# Patient Record
Sex: MALE | Race: WHITE | Employment: FULL TIME | URBAN - METROPOLITAN AREA
[De-identification: names, ages, dates, MRNs, and addresses within clinical notes are randomized per-mention and may not be internally consistent; named-entity substitution may affect disease eponyms.]

---

## 2017-02-24 ENCOUNTER — LAB ENCOUNTER (OUTPATIENT)
Dept: LAB | Age: 57
End: 2017-02-24
Attending: FAMILY MEDICINE
Payer: COMMERCIAL

## 2017-02-24 DIAGNOSIS — E11.40 TYPE 2 DIABETES MELLITUS WITH DIABETIC NEUROPATHY, WITHOUT LONG-TERM CURRENT USE OF INSULIN (HCC): ICD-10-CM

## 2017-02-24 DIAGNOSIS — Z12.5 SPECIAL SCREENING FOR MALIGNANT NEOPLASM OF PROSTATE: ICD-10-CM

## 2017-02-24 DIAGNOSIS — E11.59 HYPERTENSION ASSOCIATED WITH DIABETES (HCC): ICD-10-CM

## 2017-02-24 DIAGNOSIS — I15.2 HYPERTENSION ASSOCIATED WITH DIABETES (HCC): ICD-10-CM

## 2017-02-24 DIAGNOSIS — R22.40 ANKLE MASS: Primary | ICD-10-CM

## 2017-02-24 DIAGNOSIS — E11.42 DIABETIC POLYNEUROPATHY ASSOCIATED WITH TYPE 2 DIABETES MELLITUS (HCC): ICD-10-CM

## 2017-02-24 LAB
ALBUMIN SERPL-MCNC: 3.7 G/DL (ref 3.5–4.8)
ALP LIVER SERPL-CCNC: 87 U/L (ref 45–117)
ALT SERPL-CCNC: 18 U/L (ref 17–63)
AST SERPL-CCNC: 9 U/L (ref 15–41)
BILIRUB SERPL-MCNC: 0.2 MG/DL (ref 0.1–2)
BUN BLD-MCNC: 13 MG/DL (ref 8–20)
CALCIUM BLD-MCNC: 9.4 MG/DL (ref 8.3–10.3)
CHLORIDE: 104 MMOL/L (ref 101–111)
CO2: 28 MMOL/L (ref 22–32)
CREAT BLD-MCNC: 1.01 MG/DL (ref 0.7–1.3)
EST. AVERAGE GLUCOSE BLD GHB EST-MCNC: 169 MG/DL (ref 68–126)
GLUCOSE BLD-MCNC: 118 MG/DL (ref 70–99)
HBA1C MFR BLD HPLC: 7.5 % (ref ?–5.7)
M PROTEIN MFR SERPL ELPH: 7.4 G/DL (ref 6.1–8.3)
POTASSIUM SERPL-SCNC: 4.3 MMOL/L (ref 3.6–5.1)
PSA SERPL-MCNC: 0.86 NG/ML (ref 0.01–4)
SODIUM SERPL-SCNC: 138 MMOL/L (ref 136–144)
URIC ACID: 5.2 MG/DL (ref 2.4–8.7)

## 2017-02-24 PROCEDURE — 84153 ASSAY OF PSA TOTAL: CPT

## 2017-02-24 PROCEDURE — 36415 COLL VENOUS BLD VENIPUNCTURE: CPT

## 2017-02-24 PROCEDURE — 84550 ASSAY OF BLOOD/URIC ACID: CPT

## 2017-02-24 PROCEDURE — 83036 HEMOGLOBIN GLYCOSYLATED A1C: CPT

## 2017-02-24 PROCEDURE — 80053 COMPREHEN METABOLIC PANEL: CPT

## 2017-03-06 RX ORDER — VALSARTAN 80 MG/1
TABLET ORAL
Qty: 30 TABLET | Refills: 0 | Status: SHIPPED | OUTPATIENT
Start: 2017-03-06 | End: 2017-04-05

## 2017-03-14 ENCOUNTER — HOSPITAL ENCOUNTER (OUTPATIENT)
Dept: MRI IMAGING | Age: 57
Discharge: HOME | End: 2017-03-14
Attending: PODIATRIST
Payer: COMMERCIAL

## 2017-03-14 DIAGNOSIS — M79.89 SOFT TISSUE MASS: ICD-10-CM

## 2017-03-14 PROCEDURE — A9575 INJ GADOTERATE MEGLUMI 0.1ML: HCPCS | Performed by: RADIOLOGY

## 2017-03-14 PROCEDURE — 73723 MRI JOINT LWR EXTR W/O&W/DYE: CPT

## 2017-03-23 ENCOUNTER — OFFICE VISIT (OUTPATIENT)
Dept: FAMILY MEDICINE CLINIC | Facility: CLINIC | Age: 57
End: 2017-03-23

## 2017-03-23 VITALS
WEIGHT: 259 LBS | BODY MASS INDEX: 32 KG/M2 | TEMPERATURE: 99 F | DIASTOLIC BLOOD PRESSURE: 64 MMHG | RESPIRATION RATE: 16 BRPM | HEART RATE: 88 BPM | SYSTOLIC BLOOD PRESSURE: 116 MMHG

## 2017-03-23 DIAGNOSIS — E11.42 DIABETIC POLYNEUROPATHY ASSOCIATED WITH TYPE 2 DIABETES MELLITUS (HCC): ICD-10-CM

## 2017-03-23 DIAGNOSIS — E11.40 TYPE 2 DIABETES MELLITUS WITH DIABETIC NEUROPATHY, WITHOUT LONG-TERM CURRENT USE OF INSULIN (HCC): Primary | ICD-10-CM

## 2017-03-23 DIAGNOSIS — I15.2 HYPERTENSION ASSOCIATED WITH DIABETES (HCC): ICD-10-CM

## 2017-03-23 DIAGNOSIS — E11.59 HYPERTENSION ASSOCIATED WITH DIABETES (HCC): ICD-10-CM

## 2017-03-23 PROCEDURE — 99214 OFFICE O/P EST MOD 30 MIN: CPT | Performed by: FAMILY MEDICINE

## 2017-03-23 RX ORDER — GLIMEPIRIDE 2 MG/1
2 TABLET ORAL
Qty: 30 TABLET | Refills: 3 | Status: SHIPPED | OUTPATIENT
Start: 2017-03-23 | End: 2017-04-05

## 2017-03-23 NOTE — PROGRESS NOTES
Sean Sheikh is a 60year old male. HPI:   Patient presents today for a follow up on his hypertension and diabetes. Lab work reviewed with the patient--pt is due for lipid panel and microalbumin.   Pt reports fasting blood sugars have been around denies headaches, reports bilateral numbness/tingling to lower legs.   Musculoskeletal: No motor deficits  EXAM:   /64 mmHg  Pulse 88  Temp(Src) 98.7 °F (37.1 °C)  Resp 16  Wt 259 lb  GENERAL: well developed, well nourished,in no apparent distress  SK Ratio, Random Urine [E]; Future  - Lipid Panel [E]; Future    2. Diabetic polyneuropathy associated with type 2 diabetes mellitus ([facility]  Will check labs in 3 months. 3. Hypertension associated with diabetes ([facility].)  Continue Valsartan as ordered.     The p

## 2017-03-27 ENCOUNTER — EKG ENCOUNTER (OUTPATIENT)
Dept: LAB | Age: 57
End: 2017-03-27
Payer: COMMERCIAL

## 2017-03-27 DIAGNOSIS — Z01.818 PREOP TESTING: ICD-10-CM

## 2017-03-27 DIAGNOSIS — E11.40 TYPE 2 DIABETES MELLITUS WITH DIABETIC NEUROPATHY, WITHOUT LONG-TERM CURRENT USE OF INSULIN (HCC): ICD-10-CM

## 2017-03-27 DIAGNOSIS — E11.42 DIABETIC POLYNEUROPATHY ASSOCIATED WITH TYPE 2 DIABETES MELLITUS (HCC): ICD-10-CM

## 2017-03-27 LAB
ATRIAL RATE: 86 BPM
P AXIS: 61 DEGREES
P-R INTERVAL: 140 MS
Q-T INTERVAL: 324 MS
QRS DURATION: 82 MS
QTC CALCULATION (BEZET): 387 MS
R AXIS: 10 DEGREES
T AXIS: 59 DEGREES
VENTRICULAR RATE: 86 BPM

## 2017-03-27 PROCEDURE — 93010 ELECTROCARDIOGRAM REPORT: CPT | Performed by: INTERNAL MEDICINE

## 2017-03-27 PROCEDURE — 93005 ELECTROCARDIOGRAM TRACING: CPT

## 2017-03-30 ENCOUNTER — ANESTHESIA EVENT (OUTPATIENT)
Dept: SURGERY | Facility: HOSPITAL | Age: 57
End: 2017-03-30
Payer: COMMERCIAL

## 2017-03-30 ENCOUNTER — OFFICE VISIT (OUTPATIENT)
Dept: FAMILY MEDICINE CLINIC | Facility: CLINIC | Age: 57
End: 2017-03-30

## 2017-03-30 VITALS
SYSTOLIC BLOOD PRESSURE: 120 MMHG | BODY MASS INDEX: 32.45 KG/M2 | WEIGHT: 261 LBS | TEMPERATURE: 100 F | HEIGHT: 75 IN | RESPIRATION RATE: 18 BRPM | DIASTOLIC BLOOD PRESSURE: 56 MMHG | OXYGEN SATURATION: 99 % | HEART RATE: 91 BPM

## 2017-03-30 DIAGNOSIS — S86.219A RUPTURE OF TIBIALIS ANTERIOR TENDON: ICD-10-CM

## 2017-03-30 DIAGNOSIS — Z01.818 PREOPERATIVE CLEARANCE: Primary | ICD-10-CM

## 2017-03-30 DIAGNOSIS — E11.59 HYPERTENSION ASSOCIATED WITH DIABETES (HCC): ICD-10-CM

## 2017-03-30 DIAGNOSIS — I15.2 HYPERTENSION ASSOCIATED WITH DIABETES (HCC): ICD-10-CM

## 2017-03-30 DIAGNOSIS — E11.40 TYPE 2 DIABETES MELLITUS WITH DIABETIC NEUROPATHY, WITHOUT LONG-TERM CURRENT USE OF INSULIN (HCC): ICD-10-CM

## 2017-03-30 LAB
CREAT UR-SCNC: 132 MG/DL
MICROALBUMIN UR-MCNC: 0.96 MG/DL
MICROALBUMIN/CREAT 24H UR-RTO: 7.3 UG/MG (ref ?–30)

## 2017-03-30 PROCEDURE — 82570 ASSAY OF URINE CREATININE: CPT | Performed by: FAMILY MEDICINE

## 2017-03-30 PROCEDURE — 82043 UR ALBUMIN QUANTITATIVE: CPT | Performed by: FAMILY MEDICINE

## 2017-03-30 PROCEDURE — 99243 OFF/OP CNSLTJ NEW/EST LOW 30: CPT | Performed by: FAMILY MEDICINE

## 2017-03-30 NOTE — ANESTHESIA PREPROCEDURE EVALUATION
PRE-OP EVALUATION    Patient Name: Delaney Palmer    Pre-op Diagnosis: RUPTURE LEFT ANTERIOR TIBIALIS TENDON      Procedure(s):  REPAIR OF TIBIALIS ANTERIOR TENDON WITH ALLOGRAFT LEFT FOOT      Surgeon(s) and Role:     * EDMUND Rogers Smoking status: Light Tobacco Smoker  For 38.00 Years     Types: Cigarettes    Smokeless tobacco: Never Used    Comment: occasional cigarette, three to four cigs per week      Alcohol Use: Yes  0.0 - 0.6 oz/week    0-1 Standard drinks or equivalent

## 2017-03-30 NOTE — PROGRESS NOTES
Elisha Okeefe is a 60year old male who presents for a pre-operative physical exam. Patient is to have repair of left anterior tibialalis tendon, to be done by Dr. Wellington Zhou at [facility] on 3/31/17.       HPI:   Pt complains of left foot pain for vision  HEENT: denies nasal congestion, sinus pain or ST  LUNGS: denies shortness of breath with exertion  CARDIOVASCULAR: denies chest pain on exertion  GI: denies abdominal pain,denies heartburn  : denies dysuria  MUSCULOSKELETAL: See HPI  NEURO: denie

## 2017-03-31 ENCOUNTER — SURGERY (OUTPATIENT)
Age: 57
End: 2017-03-31

## 2017-03-31 ENCOUNTER — APPOINTMENT (OUTPATIENT)
Dept: GENERAL RADIOLOGY | Facility: HOSPITAL | Age: 57
End: 2017-03-31
Attending: PODIATRIST
Payer: COMMERCIAL

## 2017-03-31 ENCOUNTER — HOSPITAL ENCOUNTER (OUTPATIENT)
Facility: HOSPITAL | Age: 57
Setting detail: HOSPITAL OUTPATIENT SURGERY
Discharge: HOME | End: 2017-03-31
Attending: PODIATRIST | Admitting: PODIATRIST
Payer: COMMERCIAL

## 2017-03-31 ENCOUNTER — ANESTHESIA (OUTPATIENT)
Dept: SURGERY | Facility: HOSPITAL | Age: 57
End: 2017-03-31
Payer: COMMERCIAL

## 2017-03-31 VITALS
RESPIRATION RATE: 20 BRPM | DIASTOLIC BLOOD PRESSURE: 80 MMHG | HEART RATE: 77 BPM | OXYGEN SATURATION: 100 % | BODY MASS INDEX: 31.66 KG/M2 | WEIGHT: 260 LBS | HEIGHT: 76 IN | SYSTOLIC BLOOD PRESSURE: 159 MMHG | TEMPERATURE: 98 F

## 2017-03-31 DIAGNOSIS — Z01.818 PREOP TESTING: Primary | ICD-10-CM

## 2017-03-31 PROCEDURE — 82962 GLUCOSE BLOOD TEST: CPT

## 2017-03-31 PROCEDURE — 0LUW0KZ SUPPLEMENT LEFT FOOT TENDON WITH NONAUTOLOGOUS TISSUE SUBSTITUTE, OPEN APPROACH: ICD-10-PCS | Performed by: PODIATRIST

## 2017-03-31 PROCEDURE — 3E0T3CZ INTRODUCTION OF REGIONAL ANESTHETIC INTO PERIPHERAL NERVES AND PLEXI, PERCUTANEOUS APPROACH: ICD-10-PCS | Performed by: ANESTHESIOLOGY

## 2017-03-31 PROCEDURE — 76942 ECHO GUIDE FOR BIOPSY: CPT | Performed by: PODIATRIST

## 2017-03-31 PROCEDURE — 76000 FLUOROSCOPY <1 HR PHYS/QHP: CPT

## 2017-03-31 DEVICE — IMPLANTABLE DEVICE: Type: IMPLANTABLE DEVICE | Site: FOOT | Status: FUNCTIONAL

## 2017-03-31 RX ORDER — HYDROMORPHONE HYDROCHLORIDE 1 MG/ML
0.4 INJECTION, SOLUTION INTRAMUSCULAR; INTRAVENOUS; SUBCUTANEOUS EVERY 5 MIN PRN
Status: DISCONTINUED | OUTPATIENT
Start: 2017-03-31 | End: 2017-03-31

## 2017-03-31 RX ORDER — DEXTROSE MONOHYDRATE 25 G/50ML
50 INJECTION, SOLUTION INTRAVENOUS
Status: DISCONTINUED | OUTPATIENT
Start: 2017-03-31 | End: 2017-03-31 | Stop reason: HOSPADM

## 2017-03-31 RX ORDER — ONDANSETRON 2 MG/ML
4 INJECTION INTRAMUSCULAR; INTRAVENOUS AS NEEDED
Status: DISCONTINUED | OUTPATIENT
Start: 2017-03-31 | End: 2017-03-31

## 2017-03-31 RX ORDER — DEXTROSE MONOHYDRATE 25 G/50ML
50 INJECTION, SOLUTION INTRAVENOUS
Status: DISCONTINUED | OUTPATIENT
Start: 2017-03-31 | End: 2017-03-31

## 2017-03-31 RX ORDER — SODIUM CHLORIDE, SODIUM LACTATE, POTASSIUM CHLORIDE, CALCIUM CHLORIDE 600; 310; 30; 20 MG/100ML; MG/100ML; MG/100ML; MG/100ML
INJECTION, SOLUTION INTRAVENOUS CONTINUOUS
Status: DISCONTINUED | OUTPATIENT
Start: 2017-03-31 | End: 2017-03-31

## 2017-03-31 RX ORDER — HYDROCODONE BITARTRATE AND ACETAMINOPHEN 5; 325 MG/1; MG/1
2 TABLET ORAL AS NEEDED
Status: DISCONTINUED | OUTPATIENT
Start: 2017-03-31 | End: 2017-03-31

## 2017-03-31 RX ORDER — INSULIN ASPART 100 [IU]/ML
INJECTION, SOLUTION INTRAVENOUS; SUBCUTANEOUS ONCE
Status: DISCONTINUED | OUTPATIENT
Start: 2017-03-31 | End: 2017-03-31

## 2017-03-31 RX ORDER — NALOXONE HYDROCHLORIDE 0.4 MG/ML
80 INJECTION, SOLUTION INTRAMUSCULAR; INTRAVENOUS; SUBCUTANEOUS AS NEEDED
Status: DISCONTINUED | OUTPATIENT
Start: 2017-03-31 | End: 2017-03-31

## 2017-03-31 RX ORDER — ACETAMINOPHEN 500 MG
1000 TABLET ORAL ONCE AS NEEDED
Status: DISCONTINUED | OUTPATIENT
Start: 2017-03-31 | End: 2017-03-31

## 2017-03-31 RX ORDER — HYDROCODONE BITARTRATE AND ACETAMINOPHEN 5; 325 MG/1; MG/1
1 TABLET ORAL AS NEEDED
Status: DISCONTINUED | OUTPATIENT
Start: 2017-03-31 | End: 2017-03-31

## 2017-03-31 NOTE — ANESTHESIA POSTPROCEDURE EVALUATION
[address] Bourguiba II Patient Status:  Hospital Outpatient Surgery   Age/Gender 60year old male MRN [id number]   [facility] SURGERY Attending Shanna Kelley, [address] Day # 0 PCP Maico Galarza MD       Anesthesia

## 2017-03-31 NOTE — OPERATIVE REPORT
Operative Note    Patient Name: Chiara Ruiz II    Preoperative Diagnosis: left anterior tibialis tendon rupture    Postoperative Diagnosis: Left anterior tibialis tendon rupture    Primary Surgeon: Jesse Galicia DPM     Assistant: Abida Greene area on the foot was then identified and the first cuneiform and a drill hole was made initially with a 5 mm drill bit and then using a whipstitch we tried to pass the tendon through the drill hole drill hole was too small that was enlarged to a 7 mm drill

## 2017-04-05 RX ORDER — GLIMEPIRIDE 2 MG/1
2 TABLET ORAL
Qty: 90 TABLET | Refills: 1 | Status: SHIPPED | OUTPATIENT
Start: 2017-04-05 | End: 2017-10-17

## 2017-04-05 RX ORDER — VALSARTAN 80 MG/1
TABLET ORAL
Qty: 90 TABLET | Refills: 1 | Status: SHIPPED | OUTPATIENT
Start: 2017-04-05 | End: 2017-10-17

## 2017-04-05 NOTE — TELEPHONE ENCOUNTER
Last ofc med visit 3/23/17-advised to follow up in 6 months.    Hgb A1C last month 7.5% all med protocol info we have received sts A1C < or =7.5 passes protocol, however Epic flags diabetic meds as fails protocol for A1C 7.5.  meds meet protocol per our muriel

## 2017-04-05 NOTE — TELEPHONE ENCOUNTER
Call to pt's home reaches identified # voice mail. Per hipaa consent, eft vmm advising mail order med refills sent to [facility] as requested.   Advised if any med refills needed before mail order arrives, please call ofc and speak with triage nurse and we kajal

## 2017-04-05 NOTE — TELEPHONE ENCOUNTER
Pt has mail order pharmacy now and would like refills sent to [facility]:  Linagliptin (TRADJENTA) 5 MG Oral Tab, MetFORMIN HCl 1000 MG Oral Tab, VALSARTAN 80 MG Oral Tab and glimepiride 2 MG Oral Tab.

## 2017-05-09 ENCOUNTER — TELEPHONE (OUTPATIENT)
Dept: FAMILY MEDICINE CLINIC | Facility: CLINIC | Age: 57
End: 2017-05-09

## 2017-05-09 NOTE — TELEPHONE ENCOUNTER
Please call patient to advise them that they are due for their yearly Diabetic Eye exam. Please inquire the following    Name of eye doctor: Dr Mike Almanza, [address]    Date of last eye exam: 7/16/15    If pt has already had eye exam this

## 2017-10-10 RX ORDER — VALSARTAN 80 MG/1
TABLET ORAL
Qty: 90 TABLET | Refills: 0 | Status: CANCELLED | OUTPATIENT
Start: 2017-10-10

## 2017-10-10 RX ORDER — GLIMEPIRIDE 2 MG/1
TABLET ORAL
Qty: 90 TABLET | Refills: 0 | Status: CANCELLED | OUTPATIENT
Start: 2017-10-10

## 2017-10-10 RX ORDER — LINAGLIPTIN 5 MG/1
TABLET, FILM COATED ORAL
Qty: 90 TABLET | Refills: 0 | Status: CANCELLED | OUTPATIENT
Start: 2017-10-10

## 2017-10-16 DIAGNOSIS — E11.9 TYPE II OR UNSPECIFIED TYPE DIABETES MELLITUS WITHOUT MENTION OF COMPLICATION, NOT STATED AS UNCONTROLLED: ICD-10-CM

## 2017-10-16 RX ORDER — GLIMEPIRIDE 2 MG/1
2 TABLET ORAL
Qty: 90 TABLET | Refills: 1 | Status: CANCELLED
Start: 2017-10-16

## 2017-10-16 RX ORDER — VALSARTAN 80 MG/1
TABLET ORAL
Qty: 90 TABLET | Refills: 1 | Status: CANCELLED
Start: 2017-10-16

## 2017-10-17 ENCOUNTER — OFFICE VISIT (OUTPATIENT)
Dept: FAMILY MEDICINE CLINIC | Facility: CLINIC | Age: 57
End: 2017-10-17

## 2017-10-17 VITALS
HEART RATE: 88 BPM | WEIGHT: 263 LBS | RESPIRATION RATE: 12 BRPM | BODY MASS INDEX: 32.7 KG/M2 | TEMPERATURE: 100 F | DIASTOLIC BLOOD PRESSURE: 66 MMHG | SYSTOLIC BLOOD PRESSURE: 134 MMHG | HEIGHT: 75 IN

## 2017-10-17 DIAGNOSIS — E11.42 DIABETIC POLYNEUROPATHY ASSOCIATED WITH TYPE 2 DIABETES MELLITUS (HCC): ICD-10-CM

## 2017-10-17 DIAGNOSIS — I15.2 HYPERTENSION ASSOCIATED WITH DIABETES (HCC): ICD-10-CM

## 2017-10-17 DIAGNOSIS — E11.59 HYPERTENSION ASSOCIATED WITH DIABETES (HCC): ICD-10-CM

## 2017-10-17 DIAGNOSIS — E11.40 TYPE 2 DIABETES MELLITUS WITH DIABETIC NEUROPATHY, WITHOUT LONG-TERM CURRENT USE OF INSULIN (HCC): Primary | ICD-10-CM

## 2017-10-17 PROCEDURE — 99214 OFFICE O/P EST MOD 30 MIN: CPT | Performed by: FAMILY MEDICINE

## 2017-10-17 NOTE — PROGRESS NOTES
HPI:   Stef Negron is a 59year old male who presents for recheck of his diabetes. He admits that he has been traveling frequently and his diet is not what it should be. Patient’s FBS have been 120-150's.  His last hemoglobin A1c was 7.5 on 2/24/2 04/01/2016 9 (L)   03/10/2014 15   04/12/2013 12 (L)   01/09/2013 10 (L)   ----------  ALT (U/L)   Date Value   03/10/2014 23   04/12/2013 24   01/09/2013 22   ----------  Alt (U/L)   Date Value   10/18/2017 22   02/24/2017 18   04/01/2016 24   --------- OF SYSTEMS:   GENERAL HEALTH: feels well otherwise  SKIN: denies any unusual skin lesions or rashes  RESPIRATORY: denies shortness of breath with exertion  CARDIOVASCULAR: denies chest pain on exertion  GI: denies abdominal pain and denies heartburn  NEURO

## 2017-10-18 ENCOUNTER — APPOINTMENT (OUTPATIENT)
Dept: LAB | Age: 57
End: 2017-10-18
Attending: FAMILY MEDICINE
Payer: COMMERCIAL

## 2017-10-18 DIAGNOSIS — Z01.818 PREOP TESTING: ICD-10-CM

## 2017-10-18 DIAGNOSIS — E11.42 DIABETIC POLYNEUROPATHY ASSOCIATED WITH TYPE 2 DIABETES MELLITUS (HCC): ICD-10-CM

## 2017-10-18 DIAGNOSIS — E11.40 TYPE 2 DIABETES MELLITUS WITH DIABETIC NEUROPATHY, WITHOUT LONG-TERM CURRENT USE OF INSULIN (HCC): ICD-10-CM

## 2017-10-18 PROCEDURE — 80053 COMPREHEN METABOLIC PANEL: CPT

## 2017-10-18 PROCEDURE — 80061 LIPID PANEL: CPT

## 2017-10-18 PROCEDURE — 82043 UR ALBUMIN QUANTITATIVE: CPT

## 2017-10-18 PROCEDURE — 83036 HEMOGLOBIN GLYCOSYLATED A1C: CPT

## 2017-10-18 PROCEDURE — 82570 ASSAY OF URINE CREATININE: CPT

## 2017-10-18 PROCEDURE — 36415 COLL VENOUS BLD VENIPUNCTURE: CPT

## 2017-10-19 ENCOUNTER — TELEPHONE (OUTPATIENT)
Dept: FAMILY MEDICINE CLINIC | Facility: CLINIC | Age: 57
End: 2017-10-19

## 2017-10-19 DIAGNOSIS — E11.42 DIABETIC POLYNEUROPATHY ASSOCIATED WITH TYPE 2 DIABETES MELLITUS (HCC): Primary | ICD-10-CM

## 2017-10-19 DIAGNOSIS — Z12.5 SPECIAL SCREENING FOR MALIGNANT NEOPLASM OF PROSTATE: ICD-10-CM

## 2017-10-22 RX ORDER — GLIMEPIRIDE 2 MG/1
2 TABLET ORAL
Qty: 90 TABLET | Refills: 1 | Status: SHIPPED | OUTPATIENT
Start: 2017-10-22 | End: 2017-10-26

## 2017-10-22 RX ORDER — VALSARTAN 80 MG/1
TABLET ORAL
Qty: 90 TABLET | Refills: 1 | Status: SHIPPED | OUTPATIENT
Start: 2017-10-22 | End: 2017-10-26

## 2017-10-26 ENCOUNTER — TELEPHONE (OUTPATIENT)
Dept: FAMILY MEDICINE CLINIC | Facility: CLINIC | Age: 57
End: 2017-10-26

## 2017-10-26 DIAGNOSIS — E11.42 DIABETIC POLYNEUROPATHY ASSOCIATED WITH TYPE 2 DIABETES MELLITUS (HCC): ICD-10-CM

## 2017-10-26 DIAGNOSIS — I15.2 HYPERTENSION ASSOCIATED WITH DIABETES (HCC): ICD-10-CM

## 2017-10-26 DIAGNOSIS — E11.40 TYPE 2 DIABETES MELLITUS WITH DIABETIC NEUROPATHY, WITHOUT LONG-TERM CURRENT USE OF INSULIN (HCC): ICD-10-CM

## 2017-10-26 DIAGNOSIS — E11.59 HYPERTENSION ASSOCIATED WITH DIABETES (HCC): ICD-10-CM

## 2017-10-26 RX ORDER — VALSARTAN 80 MG/1
TABLET ORAL
Qty: 30 TABLET | Refills: 0 | Status: SHIPPED | OUTPATIENT
Start: 2017-10-26 | End: 2018-10-15

## 2017-10-26 RX ORDER — GLIMEPIRIDE 2 MG/1
2 TABLET ORAL
Qty: 30 TABLET | Refills: 0 | Status: SHIPPED | OUTPATIENT
Start: 2017-10-26 | End: 2018-10-23

## 2018-10-04 PROBLEM — M14.60 CHARCOT'S ARTHROPATHY: Status: ACTIVE | Noted: 2018-10-04

## 2018-10-04 PROBLEM — M19.272 OTHER SECONDARY OSTEOARTHRITIS OF LEFT FOOT: Status: ACTIVE | Noted: 2018-10-04

## 2018-10-04 PROBLEM — M25.375 INSTABILITY OF LEFT FOOT JOINT: Status: ACTIVE | Noted: 2018-10-04

## 2018-10-15 ENCOUNTER — EKG ENCOUNTER (OUTPATIENT)
Dept: LAB | Facility: HOSPITAL | Age: 58
End: 2018-10-15
Attending: ORTHOPAEDIC SURGERY
Payer: COMMERCIAL

## 2018-10-15 ENCOUNTER — TELEPHONE (OUTPATIENT)
Dept: FAMILY MEDICINE CLINIC | Facility: CLINIC | Age: 58
End: 2018-10-15

## 2018-10-15 DIAGNOSIS — E11.42 DIABETIC POLYNEUROPATHY ASSOCIATED WITH TYPE 2 DIABETES MELLITUS (HCC): ICD-10-CM

## 2018-10-15 DIAGNOSIS — E11.40 TYPE 2 DIABETES MELLITUS WITH DIABETIC NEUROPATHY, WITHOUT LONG-TERM CURRENT USE OF INSULIN (HCC): ICD-10-CM

## 2018-10-15 DIAGNOSIS — Z01.812 PRE-OPERATIVE LABORATORY EXAMINATION: ICD-10-CM

## 2018-10-15 PROCEDURE — 36415 COLL VENOUS BLD VENIPUNCTURE: CPT

## 2018-10-15 PROCEDURE — 93010 ELECTROCARDIOGRAM REPORT: CPT | Performed by: INTERNAL MEDICINE

## 2018-10-15 PROCEDURE — 80048 BASIC METABOLIC PNL TOTAL CA: CPT

## 2018-10-15 PROCEDURE — 93005 ELECTROCARDIOGRAM TRACING: CPT

## 2018-10-15 RX ORDER — LOSARTAN POTASSIUM 50 MG/1
50 TABLET ORAL DAILY
Qty: 30 TABLET | Refills: 0 | Status: SHIPPED | OUTPATIENT
Start: 2018-10-15 | End: 2018-10-23

## 2018-10-15 NOTE — TELEPHONE ENCOUNTER
Pt called and stated that the airline lost his bag since Sunday (when he was supposed to get it). He's asking for a refill for 30 days on 3 medications.     Linagliptin (TRADJENTA) 5 MG Oral Tab  valsartan 80 MG Oral Tab  MetFORMIN HCl 1000 MG Oral Tab

## 2018-10-15 NOTE — TELEPHONE ENCOUNTER
See note below, pending refill for Metformin and switching Valsartan 80 mg to Losartan 50mg. Pt will be calling his insurance for an [facility]

## 2018-10-23 ENCOUNTER — TELEPHONE (OUTPATIENT)
Dept: FAMILY MEDICINE CLINIC | Facility: CLINIC | Age: 58
End: 2018-10-23

## 2018-10-23 DIAGNOSIS — E11.40 TYPE 2 DIABETES MELLITUS WITH DIABETIC NEUROPATHY, WITHOUT LONG-TERM CURRENT USE OF INSULIN (HCC): ICD-10-CM

## 2018-10-23 DIAGNOSIS — E11.42 DIABETIC POLYNEUROPATHY ASSOCIATED WITH TYPE 2 DIABETES MELLITUS (HCC): ICD-10-CM

## 2018-10-23 RX ORDER — GLIMEPIRIDE 2 MG/1
TABLET ORAL
Qty: 90 TABLET | Refills: 0 | Status: SHIPPED | OUTPATIENT
Start: 2018-10-23 | End: 2018-12-07 | Stop reason: DRUGHIGH

## 2018-10-23 RX ORDER — LOSARTAN POTASSIUM 50 MG/1
50 TABLET ORAL DAILY
Qty: 90 TABLET | Refills: 0 | Status: SHIPPED | OUTPATIENT
Start: 2018-10-23 | End: 2019-01-08

## 2018-10-23 RX ORDER — LINAGLIPTIN 5 MG/1
TABLET, FILM COATED ORAL
Qty: 90 TABLET | Refills: 0 | Status: SHIPPED | OUTPATIENT
Start: 2018-10-23 | End: 2018-11-29 | Stop reason: ALTCHOICE

## 2018-10-23 NOTE — TELEPHONE ENCOUNTER
Fax from [employer]. Asking for alternative for valsartan d/t recall. Per TE 10/18 this alternative was already advised. Needs rx to be sent to mail order. Done.

## 2018-11-29 ENCOUNTER — OFFICE VISIT (OUTPATIENT)
Dept: FAMILY MEDICINE CLINIC | Facility: CLINIC | Age: 58
End: 2018-11-29
Payer: COMMERCIAL

## 2018-11-29 VITALS
DIASTOLIC BLOOD PRESSURE: 76 MMHG | TEMPERATURE: 98 F | SYSTOLIC BLOOD PRESSURE: 142 MMHG | HEART RATE: 88 BPM | BODY MASS INDEX: 32.51 KG/M2 | WEIGHT: 267 LBS | HEIGHT: 76 IN | RESPIRATION RATE: 18 BRPM

## 2018-11-29 DIAGNOSIS — E11.42 DIABETIC POLYNEUROPATHY ASSOCIATED WITH TYPE 2 DIABETES MELLITUS (HCC): ICD-10-CM

## 2018-11-29 DIAGNOSIS — E11.40 TYPE 2 DIABETES MELLITUS WITH DIABETIC NEUROPATHY, WITHOUT LONG-TERM CURRENT USE OF INSULIN (HCC): Primary | ICD-10-CM

## 2018-11-29 DIAGNOSIS — E11.59 HYPERTENSION ASSOCIATED WITH DIABETES (HCC): ICD-10-CM

## 2018-11-29 DIAGNOSIS — M14.60 CHARCOT'S ARTHROPATHY: ICD-10-CM

## 2018-11-29 DIAGNOSIS — I15.2 HYPERTENSION ASSOCIATED WITH DIABETES (HCC): ICD-10-CM

## 2018-11-29 PROCEDURE — 99214 OFFICE O/P EST MOD 30 MIN: CPT | Performed by: FAMILY MEDICINE

## 2018-11-29 NOTE — PROGRESS NOTES
HPI:   Iantha Cowden. is a 59year old male who presents for recheck of his diabetes. He admits that he has been traveling frequently and his diet is not what it should be. Patient’s FBS have been 120-150's.  His last hemoglobin A1c was 6.5 on 10/18 (U/L)   Date Value   11/30/2018 14 (L)   10/18/2017 13 (L)   02/24/2017 9 (L)   03/10/2014 15   04/12/2013 12 (L)   01/09/2013 10 (L)     ALT (U/L)   Date Value   03/10/2014 23   04/12/2013 24   01/09/2013 22     Alt (U/L)   Date Value   11/30/2018 18   10 38.00        Types: Cigarettes      Smokeless tobacco: Never Used      Tobacco comment: occasional cigarette, three to four cigs per week      Alcohol use:  Yes      Alcohol/week: 0.0 - 0.6 oz      Comment: 2 drinks a week     Drug use: No    Exercise: mini

## 2018-11-30 ENCOUNTER — LAB ENCOUNTER (OUTPATIENT)
Dept: LAB | Age: 58
End: 2018-11-30
Attending: FAMILY MEDICINE
Payer: COMMERCIAL

## 2018-11-30 DIAGNOSIS — E11.9 TYPE 2 DIABETES MELLITUS (HCC): Primary | ICD-10-CM

## 2018-11-30 DIAGNOSIS — Z00.00 ROUTINE GENERAL MEDICAL EXAMINATION AT A HEALTH CARE FACILITY: ICD-10-CM

## 2018-11-30 PROCEDURE — 84153 ASSAY OF PSA TOTAL: CPT | Performed by: FAMILY MEDICINE

## 2018-11-30 PROCEDURE — 80053 COMPREHEN METABOLIC PANEL: CPT | Performed by: FAMILY MEDICINE

## 2018-11-30 PROCEDURE — 36415 COLL VENOUS BLD VENIPUNCTURE: CPT | Performed by: FAMILY MEDICINE

## 2018-11-30 PROCEDURE — 82570 ASSAY OF URINE CREATININE: CPT | Performed by: FAMILY MEDICINE

## 2018-11-30 PROCEDURE — 80061 LIPID PANEL: CPT | Performed by: FAMILY MEDICINE

## 2018-11-30 PROCEDURE — 83036 HEMOGLOBIN GLYCOSYLATED A1C: CPT | Performed by: FAMILY MEDICINE

## 2018-11-30 PROCEDURE — 82043 UR ALBUMIN QUANTITATIVE: CPT | Performed by: FAMILY MEDICINE

## 2018-12-07 ENCOUNTER — PATIENT MESSAGE (OUTPATIENT)
Dept: FAMILY MEDICINE CLINIC | Facility: CLINIC | Age: 58
End: 2018-12-07

## 2018-12-07 DIAGNOSIS — E11.42 DIABETIC POLYNEUROPATHY ASSOCIATED WITH TYPE 2 DIABETES MELLITUS (HCC): ICD-10-CM

## 2018-12-07 DIAGNOSIS — E11.40 TYPE 2 DIABETES MELLITUS WITH DIABETIC NEUROPATHY, WITHOUT LONG-TERM CURRENT USE OF INSULIN (HCC): ICD-10-CM

## 2018-12-07 RX ORDER — GLIMEPIRIDE 2 MG/1
2 TABLET ORAL 2 TIMES DAILY
Qty: 60 TABLET | Refills: 2 | Status: SHIPPED | OUTPATIENT
Start: 2018-12-07 | End: 2019-01-08

## 2019-01-07 ENCOUNTER — TELEPHONE (OUTPATIENT)
Dept: FAMILY MEDICINE CLINIC | Facility: CLINIC | Age: 59
End: 2019-01-07

## 2019-01-07 DIAGNOSIS — M25.375 FOOT JOINT INSTABILITY, LEFT: Primary | ICD-10-CM

## 2019-01-07 DIAGNOSIS — M19.272: ICD-10-CM

## 2019-01-07 NOTE — TELEPHONE ENCOUNTER
Referral to Claire Grewal (INTERNAL)    Reason for the order/referral: Broken (L) foot   PCP:  Dr. Gladys Fitch   Refer to Provider (first and last name): Dr. Boris Ingram   Specialty: Ortho   Patient Insurance: Payor: Radha Toribio

## 2019-01-08 DIAGNOSIS — E11.40 TYPE 2 DIABETES MELLITUS WITH DIABETIC NEUROPATHY, WITHOUT LONG-TERM CURRENT USE OF INSULIN (HCC): ICD-10-CM

## 2019-01-08 DIAGNOSIS — E11.42 DIABETIC POLYNEUROPATHY ASSOCIATED WITH TYPE 2 DIABETES MELLITUS (HCC): ICD-10-CM

## 2019-01-08 RX ORDER — LOSARTAN POTASSIUM 50 MG/1
50 TABLET ORAL DAILY
Qty: 90 TABLET | Refills: 0 | Status: SHIPPED | OUTPATIENT
Start: 2019-01-08 | End: 2019-06-18

## 2019-01-08 RX ORDER — GLIMEPIRIDE 2 MG/1
TABLET ORAL
Qty: 90 TABLET | Refills: 0 | Status: SHIPPED | OUTPATIENT
Start: 2019-01-08 | End: 2019-06-09

## 2019-01-28 ENCOUNTER — TELEPHONE (OUTPATIENT)
Dept: FAMILY MEDICINE CLINIC | Facility: CLINIC | Age: 59
End: 2019-01-28

## 2019-02-06 NOTE — TELEPHONE ENCOUNTER
LVM to an identified VM to call back and schedule a med check appt. Pt states in the VM that he wont' be able to listen to the message until 2/11/19.

## 2019-04-30 ENCOUNTER — TELEPHONE (OUTPATIENT)
Dept: FAMILY MEDICINE CLINIC | Facility: CLINIC | Age: 59
End: 2019-04-30

## 2019-04-30 NOTE — TELEPHONE ENCOUNTER
LMOM for pt to call back and confirm if he will still be a pt of  Dr Art Fan as he has not been seen in our office

## 2019-06-09 DIAGNOSIS — E11.40 TYPE 2 DIABETES MELLITUS WITH DIABETIC NEUROPATHY, WITHOUT LONG-TERM CURRENT USE OF INSULIN (HCC): ICD-10-CM

## 2019-06-09 DIAGNOSIS — E11.42 DIABETIC POLYNEUROPATHY ASSOCIATED WITH TYPE 2 DIABETES MELLITUS (HCC): ICD-10-CM

## 2019-06-10 NOTE — TELEPHONE ENCOUNTER
Pt needs appt before any refills. Way overdue. Please have them call to schedule. Thanks    Return in about 3 months (around 2/28/2019) for Hypertension, Hypercholesterolemia, NIDDM.

## 2019-06-18 RX ORDER — GLIMEPIRIDE 2 MG/1
TABLET ORAL
Qty: 30 TABLET | Refills: 0 | Status: SHIPPED | OUTPATIENT
Start: 2019-06-18 | End: 2019-07-10

## 2019-06-18 RX ORDER — LOSARTAN POTASSIUM 50 MG/1
50 TABLET ORAL DAILY
Qty: 30 TABLET | Refills: 0 | Status: SHIPPED | OUTPATIENT
Start: 2019-06-18 | End: 2019-07-16

## 2019-06-21 ENCOUNTER — APPOINTMENT (OUTPATIENT)
Dept: LAB | Age: 59
End: 2019-06-21
Attending: FAMILY MEDICINE
Payer: COMMERCIAL

## 2019-06-21 DIAGNOSIS — E11.40 TYPE 2 DIABETES MELLITUS WITH DIABETIC NEUROPATHY, WITHOUT LONG-TERM CURRENT USE OF INSULIN (HCC): ICD-10-CM

## 2019-06-21 DIAGNOSIS — M19.272 OTHER SECONDARY OSTEOARTHRITIS OF LEFT FOOT: ICD-10-CM

## 2019-06-21 PROCEDURE — 83036 HEMOGLOBIN GLYCOSYLATED A1C: CPT

## 2019-06-21 PROCEDURE — 36415 COLL VENOUS BLD VENIPUNCTURE: CPT

## 2019-06-21 PROCEDURE — 82306 VITAMIN D 25 HYDROXY: CPT

## 2019-06-24 ENCOUNTER — PATIENT MESSAGE (OUTPATIENT)
Dept: FAMILY MEDICINE CLINIC | Facility: CLINIC | Age: 59
End: 2019-06-24

## 2019-06-24 DIAGNOSIS — E11.42 DIABETIC POLYNEUROPATHY ASSOCIATED WITH TYPE 2 DIABETES MELLITUS (HCC): ICD-10-CM

## 2019-06-24 DIAGNOSIS — E11.40 TYPE 2 DIABETES MELLITUS WITH DIABETIC NEUROPATHY, WITHOUT LONG-TERM CURRENT USE OF INSULIN (HCC): Primary | ICD-10-CM

## 2019-06-24 DIAGNOSIS — E78.1 HIGH TRIGLYCERIDES: ICD-10-CM

## 2019-06-28 ENCOUNTER — APPOINTMENT (OUTPATIENT)
Dept: LAB | Age: 59
End: 2019-06-28
Attending: FAMILY MEDICINE
Payer: COMMERCIAL

## 2019-06-28 DIAGNOSIS — E78.1 HIGH TRIGLYCERIDES: ICD-10-CM

## 2019-06-28 DIAGNOSIS — E11.42 DIABETIC POLYNEUROPATHY ASSOCIATED WITH TYPE 2 DIABETES MELLITUS (HCC): ICD-10-CM

## 2019-06-28 DIAGNOSIS — E11.40 TYPE 2 DIABETES MELLITUS WITH DIABETIC NEUROPATHY, WITHOUT LONG-TERM CURRENT USE OF INSULIN (HCC): ICD-10-CM

## 2019-06-28 LAB
ALBUMIN SERPL-MCNC: 3.6 G/DL (ref 3.4–5)
ALBUMIN/GLOB SERPL: 1 {RATIO} (ref 1–2)
ALP LIVER SERPL-CCNC: 80 U/L (ref 45–117)
ALT SERPL-CCNC: 18 U/L (ref 16–61)
ANION GAP SERPL CALC-SCNC: 5 MMOL/L (ref 0–18)
AST SERPL-CCNC: 11 U/L (ref 15–37)
BILIRUB SERPL-MCNC: 0.4 MG/DL (ref 0.1–2)
BUN BLD-MCNC: 17 MG/DL (ref 7–18)
BUN/CREAT SERPL: 15.9 (ref 10–20)
CALCIUM BLD-MCNC: 8.8 MG/DL (ref 8.5–10.1)
CHLORIDE SERPL-SCNC: 106 MMOL/L (ref 98–112)
CHOLEST SMN-MCNC: 137 MG/DL (ref ?–200)
CO2 SERPL-SCNC: 29 MMOL/L (ref 21–32)
CREAT BLD-MCNC: 1.07 MG/DL (ref 0.7–1.3)
CREAT UR-SCNC: 82.1 MG/DL
GLOBULIN PLAS-MCNC: 3.6 G/DL (ref 2.8–4.4)
GLUCOSE BLD-MCNC: 188 MG/DL (ref 70–99)
HDLC SERPL-MCNC: 51 MG/DL (ref 40–59)
LDLC SERPL CALC-MCNC: 74 MG/DL (ref ?–100)
M PROTEIN MFR SERPL ELPH: 7.2 G/DL (ref 6.4–8.2)
MICROALBUMIN UR-MCNC: 1.36 MG/DL
MICROALBUMIN/CREAT 24H UR-RTO: 16.6 UG/MG (ref ?–30)
NONHDLC SERPL-MCNC: 86 MG/DL (ref ?–130)
OSMOLALITY SERPL CALC.SUM OF ELEC: 297 MOSM/KG (ref 275–295)
POTASSIUM SERPL-SCNC: 4.3 MMOL/L (ref 3.5–5.1)
SODIUM SERPL-SCNC: 140 MMOL/L (ref 136–145)
TRIGL SERPL-MCNC: 58 MG/DL (ref 30–149)
VLDLC SERPL CALC-MCNC: 12 MG/DL (ref 0–30)

## 2019-06-28 PROCEDURE — 80053 COMPREHEN METABOLIC PANEL: CPT

## 2019-06-28 PROCEDURE — 82570 ASSAY OF URINE CREATININE: CPT

## 2019-06-28 PROCEDURE — 82043 UR ALBUMIN QUANTITATIVE: CPT

## 2019-06-28 PROCEDURE — 80061 LIPID PANEL: CPT

## 2019-06-28 PROCEDURE — 36415 COLL VENOUS BLD VENIPUNCTURE: CPT

## 2019-07-10 DIAGNOSIS — E11.42 DIABETIC POLYNEUROPATHY ASSOCIATED WITH TYPE 2 DIABETES MELLITUS (HCC): ICD-10-CM

## 2019-07-10 DIAGNOSIS — E11.40 TYPE 2 DIABETES MELLITUS WITH DIABETIC NEUROPATHY, WITHOUT LONG-TERM CURRENT USE OF INSULIN (HCC): ICD-10-CM

## 2019-07-10 RX ORDER — GLIMEPIRIDE 2 MG/1
TABLET ORAL
Qty: 90 TABLET | Refills: 0 | Status: SHIPPED | OUTPATIENT
Start: 2019-07-10 | End: 2019-07-16

## 2019-07-10 NOTE — TELEPHONE ENCOUNTER
Metformin 1000 mg tab. Glimepriride 2 mg. Please see pended medications. Please sign if appropriate. Thank you    A [employer] message was sent to the pt to call and schedule a medication visit. His lat OV was on 11/29/2018.

## 2019-07-16 ENCOUNTER — OFFICE VISIT (OUTPATIENT)
Dept: FAMILY MEDICINE CLINIC | Facility: CLINIC | Age: 59
End: 2019-07-16

## 2019-07-16 VITALS
HEIGHT: 76 IN | BODY MASS INDEX: 33 KG/M2 | HEART RATE: 76 BPM | RESPIRATION RATE: 16 BRPM | TEMPERATURE: 99 F | SYSTOLIC BLOOD PRESSURE: 156 MMHG | DIASTOLIC BLOOD PRESSURE: 72 MMHG | WEIGHT: 271 LBS

## 2019-07-16 DIAGNOSIS — I15.2 HYPERTENSION ASSOCIATED WITH DIABETES (HCC): ICD-10-CM

## 2019-07-16 DIAGNOSIS — Z12.5 SPECIAL SCREENING FOR MALIGNANT NEOPLASM OF PROSTATE: ICD-10-CM

## 2019-07-16 DIAGNOSIS — E11.59 HYPERTENSION ASSOCIATED WITH DIABETES (HCC): ICD-10-CM

## 2019-07-16 DIAGNOSIS — E11.9 TYPE 2 DIABETES MELLITUS WITHOUT COMPLICATION, WITHOUT LONG-TERM CURRENT USE OF INSULIN (HCC): Primary | ICD-10-CM

## 2019-07-16 DIAGNOSIS — M14.60 CHARCOT'S ARTHROPATHY: ICD-10-CM

## 2019-07-16 DIAGNOSIS — E11.42 DIABETIC POLYNEUROPATHY ASSOCIATED WITH TYPE 2 DIABETES MELLITUS (HCC): ICD-10-CM

## 2019-07-16 DIAGNOSIS — R09.89 DECREASED PULSES IN FEET: ICD-10-CM

## 2019-07-16 PROCEDURE — 99214 OFFICE O/P EST MOD 30 MIN: CPT | Performed by: FAMILY MEDICINE

## 2019-07-16 RX ORDER — GLIMEPIRIDE 2 MG/1
TABLET ORAL
Qty: 270 TABLET | Refills: 0 | Status: SHIPPED
Start: 2019-07-16 | End: 2019-10-14

## 2019-07-16 RX ORDER — LOSARTAN POTASSIUM 50 MG/1
50 TABLET ORAL DAILY
Qty: 90 TABLET | Refills: 1 | Status: SHIPPED | OUTPATIENT
Start: 2019-07-16 | End: 2019-12-23

## 2019-07-16 NOTE — PROGRESS NOTES
HPI:   Sandra Madison. is a 59year old male who presents for recheck of his diabetes. He admits that he has been traveling frequently and his diet is not what it should be. Patient’s FBS have been 120-150's.  His last hemoglobin A1c was 7.8 on 6/21/ 11/30/2018 14 (L)   10/18/2017 13 (L)   03/10/2014 15   04/12/2013 12 (L)   01/09/2013 10 (L)     ALT (U/L)   Date Value   06/28/2019 18   11/30/2018 18   10/18/2017 22   03/10/2014 23   04/12/2013 24   01/09/2013 22      Malb/Cre Calc   Date Value Ref R • OTHER      Right big toe partially amputated   • TIBIAL BONE GRAFTING Left 10/19/2018    Performed by Chitra Luna MD at [address] [place] [address]  18 years ago      Social History: Social History    Tobacco Use      Smoking status: of prostate    Orders Placed This Encounter      Comp Metabolic Panel (14)      PSA [53]      Hemoglobin A1C    Meds & Refills for this Visit:  Requested Prescriptions     Signed Prescriptions Disp Refills   • losartan Potassium 50 MG Oral Tab 90 tablet

## 2019-07-25 ENCOUNTER — ANESTHESIA EVENT (OUTPATIENT)
Dept: SURGERY | Facility: HOSPITAL | Age: 59
End: 2019-07-25
Payer: COMMERCIAL

## 2019-07-26 ENCOUNTER — APPOINTMENT (OUTPATIENT)
Dept: GENERAL RADIOLOGY | Facility: HOSPITAL | Age: 59
End: 2019-07-26
Attending: ORTHOPAEDIC SURGERY
Payer: COMMERCIAL

## 2019-07-26 ENCOUNTER — ANESTHESIA (OUTPATIENT)
Dept: SURGERY | Facility: HOSPITAL | Age: 59
End: 2019-07-26
Payer: COMMERCIAL

## 2019-07-26 ENCOUNTER — HOSPITAL ENCOUNTER (OUTPATIENT)
Facility: HOSPITAL | Age: 59
Setting detail: HOSPITAL OUTPATIENT SURGERY
Discharge: HOME | End: 2019-07-26
Attending: ORTHOPAEDIC SURGERY | Admitting: ORTHOPAEDIC SURGERY
Payer: COMMERCIAL

## 2019-07-26 VITALS
OXYGEN SATURATION: 96 % | SYSTOLIC BLOOD PRESSURE: 130 MMHG | WEIGHT: 266 LBS | BODY MASS INDEX: 32.39 KG/M2 | HEIGHT: 76 IN | HEART RATE: 85 BPM | TEMPERATURE: 97 F | RESPIRATION RATE: 16 BRPM | DIASTOLIC BLOOD PRESSURE: 61 MMHG

## 2019-07-26 DIAGNOSIS — M19.272: ICD-10-CM

## 2019-07-26 LAB
GLUCOSE BLDC GLUCOMTR-MCNC: 153 MG/DL (ref 70–99)
GLUCOSE BLDC GLUCOMTR-MCNC: 249 MG/DL (ref 70–99)

## 2019-07-26 PROCEDURE — 99152 MOD SED SAME PHYS/QHP 5/>YRS: CPT | Performed by: ORTHOPAEDIC SURGERY

## 2019-07-26 PROCEDURE — 64445 NJX AA&/STRD SCIATIC NRV IMG: CPT | Performed by: ORTHOPAEDIC SURGERY

## 2019-07-26 PROCEDURE — 64447 NJX AA&/STRD FEMORAL NRV IMG: CPT | Performed by: ORTHOPAEDIC SURGERY

## 2019-07-26 PROCEDURE — 82962 GLUCOSE BLOOD TEST: CPT

## 2019-07-26 PROCEDURE — 0QBH0ZZ EXCISION OF LEFT TIBIA, OPEN APPROACH: ICD-10-PCS | Performed by: ORTHOPAEDIC SURGERY

## 2019-07-26 PROCEDURE — 88300 SURGICAL PATH GROSS: CPT | Performed by: ORTHOPAEDIC SURGERY

## 2019-07-26 PROCEDURE — 0L8T0ZZ DIVISION OF LEFT ANKLE TENDON, OPEN APPROACH: ICD-10-PCS | Performed by: ORTHOPAEDIC SURGERY

## 2019-07-26 PROCEDURE — 3E0T3BZ INTRODUCTION OF ANESTHETIC AGENT INTO PERIPHERAL NERVES AND PLEXI, PERCUTANEOUS APPROACH: ICD-10-PCS | Performed by: ANESTHESIOLOGY

## 2019-07-26 PROCEDURE — 0QSP04Z REPOSITION LEFT METATARSAL WITH INTERNAL FIXATION DEVICE, OPEN APPROACH: ICD-10-PCS | Performed by: ORTHOPAEDIC SURGERY

## 2019-07-26 PROCEDURE — 0SGL07Z FUSION OF LEFT TARSOMETATARSAL JOINT WITH AUTOLOGOUS TISSUE SUBSTITUTE, OPEN APPROACH: ICD-10-PCS | Performed by: ORTHOPAEDIC SURGERY

## 2019-07-26 PROCEDURE — 76942 ECHO GUIDE FOR BIOPSY: CPT | Performed by: ORTHOPAEDIC SURGERY

## 2019-07-26 PROCEDURE — 94010 BREATHING CAPACITY TEST: CPT | Performed by: ORTHOPAEDIC SURGERY

## 2019-07-26 PROCEDURE — 76000 FLUOROSCOPY <1 HR PHYS/QHP: CPT | Performed by: ORTHOPAEDIC SURGERY

## 2019-07-26 DEVICE — ONE (1) PACKAGE - CONTAINING 5.0CC
Type: IMPLANTABLE DEVICE | Site: FOOT | Status: FUNCTIONAL
Brand: OSTEOSELECT PLUS DBM PUTTY 5.0CC

## 2019-07-26 DEVICE — IMPLANTABLE DEVICE
Type: IMPLANTABLE DEVICE | Site: FOOT | Status: FUNCTIONAL
Brand: ORTHOLOC

## 2019-07-26 DEVICE — PROTEIOS XL 10CC: Type: IMPLANTABLE DEVICE | Site: FOOT | Status: FUNCTIONAL

## 2019-07-26 DEVICE — IMPLANTABLE DEVICE
Type: IMPLANTABLE DEVICE | Site: FOOT | Status: FUNCTIONAL
Brand: ORTHOLOC 3DI

## 2019-07-26 RX ORDER — ONDANSETRON 2 MG/ML
4 INJECTION INTRAMUSCULAR; INTRAVENOUS ONCE AS NEEDED
Status: DISCONTINUED | OUTPATIENT
Start: 2019-07-26 | End: 2019-07-26

## 2019-07-26 RX ORDER — MORPHINE SULFATE 4 MG/ML
2 INJECTION, SOLUTION INTRAMUSCULAR; INTRAVENOUS EVERY 2 HOUR PRN
Status: CANCELLED | OUTPATIENT
Start: 2019-07-26

## 2019-07-26 RX ORDER — ROCURONIUM BROMIDE 10 MG/ML
INJECTION, SOLUTION INTRAVENOUS AS NEEDED
Status: DISCONTINUED | OUTPATIENT
Start: 2019-07-26 | End: 2019-07-26 | Stop reason: SURG

## 2019-07-26 RX ORDER — PHENYLEPHRINE HCL 10 MG/ML
VIAL (ML) INJECTION AS NEEDED
Status: DISCONTINUED | OUTPATIENT
Start: 2019-07-26 | End: 2019-07-26 | Stop reason: SURG

## 2019-07-26 RX ORDER — ACETAMINOPHEN 500 MG
1000 TABLET ORAL ONCE
Status: COMPLETED | OUTPATIENT
Start: 2019-07-26 | End: 2019-07-26

## 2019-07-26 RX ORDER — NALOXONE HYDROCHLORIDE 0.4 MG/ML
80 INJECTION, SOLUTION INTRAMUSCULAR; INTRAVENOUS; SUBCUTANEOUS AS NEEDED
Status: DISCONTINUED | OUTPATIENT
Start: 2019-07-26 | End: 2019-07-26

## 2019-07-26 RX ORDER — HYDROMORPHONE HYDROCHLORIDE 1 MG/ML
0.4 INJECTION, SOLUTION INTRAMUSCULAR; INTRAVENOUS; SUBCUTANEOUS EVERY 5 MIN PRN
Status: DISCONTINUED | OUTPATIENT
Start: 2019-07-26 | End: 2019-07-26

## 2019-07-26 RX ORDER — INSULIN ASPART 100 [IU]/ML
INJECTION, SOLUTION INTRAVENOUS; SUBCUTANEOUS ONCE
Status: COMPLETED | OUTPATIENT
Start: 2019-07-26 | End: 2019-07-26

## 2019-07-26 RX ORDER — HALOPERIDOL 5 MG/ML
0.25 INJECTION INTRAMUSCULAR ONCE AS NEEDED
Status: DISCONTINUED | OUTPATIENT
Start: 2019-07-26 | End: 2019-07-26

## 2019-07-26 RX ORDER — SODIUM CHLORIDE, SODIUM LACTATE, POTASSIUM CHLORIDE, CALCIUM CHLORIDE 600; 310; 30; 20 MG/100ML; MG/100ML; MG/100ML; MG/100ML
INJECTION, SOLUTION INTRAVENOUS CONTINUOUS
Status: CANCELLED | OUTPATIENT
Start: 2019-07-26

## 2019-07-26 RX ORDER — METOCLOPRAMIDE 10 MG/1
10 TABLET ORAL ONCE
Status: COMPLETED | OUTPATIENT
Start: 2019-07-26 | End: 2019-07-26

## 2019-07-26 RX ORDER — HYDROCODONE BITARTRATE AND ACETAMINOPHEN 5; 325 MG/1; MG/1
1 TABLET ORAL AS NEEDED
Status: DISCONTINUED | OUTPATIENT
Start: 2019-07-26 | End: 2019-07-26

## 2019-07-26 RX ORDER — ACETAMINOPHEN 325 MG/1
650 TABLET ORAL EVERY 4 HOURS PRN
Status: CANCELLED | OUTPATIENT
Start: 2019-07-26

## 2019-07-26 RX ORDER — HYDROCODONE BITARTRATE AND ACETAMINOPHEN 10; 325 MG/1; MG/1
1 TABLET ORAL EVERY 4 HOURS PRN
Status: CANCELLED | OUTPATIENT
Start: 2019-07-26

## 2019-07-26 RX ORDER — BISACODYL 10 MG
10 SUPPOSITORY, RECTAL RECTAL
Status: CANCELLED | OUTPATIENT
Start: 2019-07-26

## 2019-07-26 RX ORDER — SODIUM PHOSPHATE, DIBASIC AND SODIUM PHOSPHATE, MONOBASIC 7; 19 G/133ML; G/133ML
1 ENEMA RECTAL ONCE AS NEEDED
Status: CANCELLED | OUTPATIENT
Start: 2019-07-26

## 2019-07-26 RX ORDER — PROCHLORPERAZINE EDISYLATE 5 MG/ML
5 INJECTION INTRAMUSCULAR; INTRAVENOUS ONCE AS NEEDED
Status: DISCONTINUED | OUTPATIENT
Start: 2019-07-26 | End: 2019-07-26

## 2019-07-26 RX ORDER — SODIUM CHLORIDE, SODIUM LACTATE, POTASSIUM CHLORIDE, CALCIUM CHLORIDE 600; 310; 30; 20 MG/100ML; MG/100ML; MG/100ML; MG/100ML
INJECTION, SOLUTION INTRAVENOUS CONTINUOUS
Status: DISCONTINUED | OUTPATIENT
Start: 2019-07-26 | End: 2019-07-26

## 2019-07-26 RX ORDER — BUPIVACAINE HYDROCHLORIDE 5 MG/ML
INJECTION, SOLUTION EPIDURAL; INTRACAUDAL AS NEEDED
Status: DISCONTINUED | OUTPATIENT
Start: 2019-07-26 | End: 2019-07-26 | Stop reason: SURG

## 2019-07-26 RX ORDER — ZOLPIDEM TARTRATE 5 MG/1
5 TABLET ORAL NIGHTLY PRN
Status: CANCELLED | OUTPATIENT
Start: 2019-07-26

## 2019-07-26 RX ORDER — MORPHINE SULFATE 4 MG/ML
4 INJECTION, SOLUTION INTRAMUSCULAR; INTRAVENOUS EVERY 2 HOUR PRN
Status: CANCELLED | OUTPATIENT
Start: 2019-07-26

## 2019-07-26 RX ORDER — POLYETHYLENE GLYCOL 3350 17 G/17G
17 POWDER, FOR SOLUTION ORAL DAILY PRN
Status: CANCELLED | OUTPATIENT
Start: 2019-07-26

## 2019-07-26 RX ORDER — MORPHINE SULFATE 10 MG/ML
6 INJECTION, SOLUTION INTRAMUSCULAR; INTRAVENOUS EVERY 10 MIN PRN
Status: DISCONTINUED | OUTPATIENT
Start: 2019-07-26 | End: 2019-07-26

## 2019-07-26 RX ORDER — HYDROCODONE BITARTRATE AND ACETAMINOPHEN 5; 325 MG/1; MG/1
2 TABLET ORAL AS NEEDED
Status: DISCONTINUED | OUTPATIENT
Start: 2019-07-26 | End: 2019-07-26

## 2019-07-26 RX ORDER — ONDANSETRON 2 MG/ML
INJECTION INTRAMUSCULAR; INTRAVENOUS AS NEEDED
Status: DISCONTINUED | OUTPATIENT
Start: 2019-07-26 | End: 2019-07-26 | Stop reason: SURG

## 2019-07-26 RX ORDER — NEOSTIGMINE METHYLSULFATE 0.5 MG/ML
INJECTION INTRAVENOUS AS NEEDED
Status: DISCONTINUED | OUTPATIENT
Start: 2019-07-26 | End: 2019-07-26 | Stop reason: SURG

## 2019-07-26 RX ORDER — DEXAMETHASONE SODIUM PHOSPHATE 4 MG/ML
VIAL (ML) INJECTION AS NEEDED
Status: DISCONTINUED | OUTPATIENT
Start: 2019-07-26 | End: 2019-07-26 | Stop reason: SURG

## 2019-07-26 RX ORDER — SODIUM CHLORIDE 0.9 % (FLUSH) 0.9 %
10 SYRINGE (ML) INJECTION AS NEEDED
Status: CANCELLED | OUTPATIENT
Start: 2019-07-26

## 2019-07-26 RX ORDER — HYDROMORPHONE HYDROCHLORIDE 1 MG/ML
0.2 INJECTION, SOLUTION INTRAMUSCULAR; INTRAVENOUS; SUBCUTANEOUS EVERY 5 MIN PRN
Status: DISCONTINUED | OUTPATIENT
Start: 2019-07-26 | End: 2019-07-26

## 2019-07-26 RX ORDER — FAMOTIDINE 20 MG/1
20 TABLET ORAL ONCE
Status: COMPLETED | OUTPATIENT
Start: 2019-07-26 | End: 2019-07-26

## 2019-07-26 RX ORDER — ASPIRIN 325 MG
325 TABLET ORAL 2 TIMES DAILY
Status: CANCELLED | OUTPATIENT
Start: 2019-07-27 | End: 2019-09-07

## 2019-07-26 RX ORDER — ONDANSETRON 2 MG/ML
4 INJECTION INTRAMUSCULAR; INTRAVENOUS EVERY 6 HOURS PRN
Status: CANCELLED | OUTPATIENT
Start: 2019-07-26

## 2019-07-26 RX ORDER — MORPHINE SULFATE 4 MG/ML
4 INJECTION, SOLUTION INTRAMUSCULAR; INTRAVENOUS EVERY 10 MIN PRN
Status: DISCONTINUED | OUTPATIENT
Start: 2019-07-26 | End: 2019-07-26

## 2019-07-26 RX ORDER — CEFAZOLIN SODIUM/WATER 2 G/20 ML
2 SYRINGE (ML) INTRAVENOUS EVERY 8 HOURS
Status: CANCELLED | OUTPATIENT
Start: 2019-07-26 | End: 2019-07-26

## 2019-07-26 RX ORDER — DEXTROSE MONOHYDRATE 25 G/50ML
50 INJECTION, SOLUTION INTRAVENOUS
Status: DISCONTINUED | OUTPATIENT
Start: 2019-07-26 | End: 2019-07-26

## 2019-07-26 RX ORDER — HYDROMORPHONE HYDROCHLORIDE 1 MG/ML
0.6 INJECTION, SOLUTION INTRAMUSCULAR; INTRAVENOUS; SUBCUTANEOUS EVERY 5 MIN PRN
Status: DISCONTINUED | OUTPATIENT
Start: 2019-07-26 | End: 2019-07-26

## 2019-07-26 RX ORDER — MORPHINE SULFATE 4 MG/ML
2 INJECTION, SOLUTION INTRAMUSCULAR; INTRAVENOUS EVERY 10 MIN PRN
Status: DISCONTINUED | OUTPATIENT
Start: 2019-07-26 | End: 2019-07-26

## 2019-07-26 RX ORDER — MORPHINE SULFATE 4 MG/ML
1 INJECTION, SOLUTION INTRAMUSCULAR; INTRAVENOUS EVERY 2 HOUR PRN
Status: CANCELLED | OUTPATIENT
Start: 2019-07-26

## 2019-07-26 RX ORDER — DOCUSATE SODIUM 100 MG/1
100 CAPSULE, LIQUID FILLED ORAL 2 TIMES DAILY
Status: CANCELLED | OUTPATIENT
Start: 2019-07-26

## 2019-07-26 RX ORDER — GLYCOPYRROLATE 0.2 MG/ML
INJECTION INTRAMUSCULAR; INTRAVENOUS AS NEEDED
Status: DISCONTINUED | OUTPATIENT
Start: 2019-07-26 | End: 2019-07-26 | Stop reason: SURG

## 2019-07-26 RX ORDER — HYDROCODONE BITARTRATE AND ACETAMINOPHEN 10; 325 MG/1; MG/1
2 TABLET ORAL EVERY 4 HOURS PRN
Status: CANCELLED | OUTPATIENT
Start: 2019-07-26

## 2019-07-26 RX ADMIN — GLYCOPYRROLATE 0.6 MG: 0.2 INJECTION INTRAMUSCULAR; INTRAVENOUS at 10:46:00

## 2019-07-26 RX ADMIN — BUPIVACAINE HYDROCHLORIDE 5 ML: 5 INJECTION, SOLUTION EPIDURAL; INTRACAUDAL at 07:21:00

## 2019-07-26 RX ADMIN — BUPIVACAINE HYDROCHLORIDE 5 ML: 5 INJECTION, SOLUTION EPIDURAL; INTRACAUDAL at 07:10:00

## 2019-07-26 RX ADMIN — BUPIVACAINE HYDROCHLORIDE 5 ML: 5 INJECTION, SOLUTION EPIDURAL; INTRACAUDAL at 07:12:00

## 2019-07-26 RX ADMIN — ONDANSETRON 4 MG: 2 INJECTION INTRAMUSCULAR; INTRAVENOUS at 10:46:00

## 2019-07-26 RX ADMIN — ROCURONIUM BROMIDE 50 MG: 10 INJECTION, SOLUTION INTRAVENOUS at 07:37:00

## 2019-07-26 RX ADMIN — BUPIVACAINE HYDROCHLORIDE 5 ML: 5 INJECTION, SOLUTION EPIDURAL; INTRACAUDAL at 07:14:00

## 2019-07-26 RX ADMIN — NEOSTIGMINE METHYLSULFATE 4 MG: 0.5 INJECTION INTRAVENOUS at 10:46:00

## 2019-07-26 RX ADMIN — BUPIVACAINE HYDROCHLORIDE 5 ML: 5 INJECTION, SOLUTION EPIDURAL; INTRACAUDAL at 07:11:00

## 2019-07-26 RX ADMIN — BUPIVACAINE HYDROCHLORIDE 5 ML: 5 INJECTION, SOLUTION EPIDURAL; INTRACAUDAL at 07:20:00

## 2019-07-26 RX ADMIN — DEXAMETHASONE SODIUM PHOSPHATE 4 MG: 4 MG/ML VIAL (ML) INJECTION at 07:45:00

## 2019-07-26 RX ADMIN — PHENYLEPHRINE HCL 100 MCG: 10 MG/ML VIAL (ML) INJECTION at 08:19:00

## 2019-07-26 NOTE — ANESTHESIA PREPROCEDURE EVALUATION
Anesthesia PreOp Note    HPI:     Carole Knapp. is a 59year old male who presents for preoperative consultation requested by: Mirella Hammond MD    Date of Surgery: 7/26/2019    Procedure(s):  FOOT JOINT FUSION  EXTREMITY LOWER HARDWARE REMOVAL Date Noted: 06/29/2012      Other nonspecific finding on examination of urine         Class: Chronic         Date Noted: 06/29/2012      Screening for other and unspecified genitourinary condition         Class: Chronic         Date Noted: 06/29/2012 time   Glucose Blood (ONE TOUCH ULTRA TEST) In Vitro Strip Test twice daily Disp: 100 Each Rfl: 6 Taking       Current Facility-Administered Medications Ordered in Epic:  lactated ringers infusion  Intravenous Continuous Allyn Montenegro MD Last Rate: 20 violence:        Fear of current or ex partner: Not on file        Emotionally abused: Not on file        Physically abused: Not on file        Forced sexual activity: Not on file    Other Topics      Concerns:         Service: Not Asked        Blo General  Airway:  ETT  Post-op Pain Management: Popliteal block and Saphenous block  Informed Consent Plan and Risks Discussed With:  Patient      I have informed Bhumi Garibay. and/or legal guardian or family member of the nature of the anesthetic

## 2019-07-26 NOTE — OPERATIVE REPORT
[facility]    PATIENT'S NAME: Edward Guillen   ATTENDING PHYSICIAN: Deann Tierney MD   OPERATING PHYSICIAN: Deann Tierney MD   PATIENT ACCOUNT#:   [id number]    LOCATION:  [address] 10  MEDICAL RECORD #:   [id number]       DATE padded. He was given antibiotics prior to skin incision. After prepping and draping the left lower extremity in the usual sterile fashion, the limb was elevated. Tourniquet was inflated to 250 mmHg.   The previously utilized dorsal incision was made over the proximal tibia.   We made a 2 cm transverse incision over Gerdy tubercle, dissected down through subcutaneous tissues, incised the deep fascia, elevated the extensor musculature, and then used an osteotome and made a 3-sided cortical window, hinged this secured this first with a nonlocking screw in the cuneiform. Then we did a compression screw in the second metatarsal.  We did an additional locking screw in the second metatarsal and then ultimately an additional locking screw in the middle cuneiform.   Sukhjinder Paul placement of provisional fixation.     Dictated By Dulce Calixto MD  d: 07/26/2019 12:01:55  t: 07/26/2019 17:38:41  Job [id number]  /

## 2019-07-26 NOTE — ANESTHESIA PROCEDURE NOTES
Airway  Urgency: elective      General Information and Staff    Patient location during procedure: OR  Anesthesiologist: Jessica Noonan MD  Performed: anesthesiologist     Indications and Patient Condition  Indications for airway management: anesthesia  Se

## 2019-07-26 NOTE — ANESTHESIA PROCEDURE NOTES
Peripheral Block    Anesthesiologist:  Amy Dc MD  Performed by:   Anesthesiologist  Patient Location:  PACU  Start Time:  7/26/2019 7:08 AM  End Time:  7/26/2019 7:16 AM  Site Identification: ultrasound guided, real time ultrasound guided and IMAGE

## 2019-07-26 NOTE — ANESTHESIA POSTPROCEDURE EVALUATION
Patient: Tejal Baron.     Procedure Summary     Date:  07/26/19 Room / Location:  [facility] OR  / [facility] OR    Anesthesia Start:  5018 Anesthesia Stop:  0765    Procedures:       FOOT JOINT FUSION (Left )      EXTREMITY LOWER HARDWARE REMOVAL (L

## 2019-07-26 NOTE — H&P
History & Physical Examination    Patient Name: Daniel Kaye   MRN: [id number]  [facility]: [id number]  YOB: 1960    Diagnosis: left foot nonuion, charcot foot    Present Illness: 58 y/o with nonunion left midfoot      Medications Prior to A • GASTROC RECESSION FOOT Left 10/19/2018    Performed by Delmy Dick MD at [address] [facility]   • OTHER      Right big toe partially amputated   • TIBIAL BONE GRAFTING Left 10/19/2018    Performed by Delmy Dick MD at [id number] [place] [address]

## 2019-07-26 NOTE — ANESTHESIA PROCEDURE NOTES
Peripheral Block    Anesthesiologist:  Fawn Flanagan MD  Performed by:   Anesthesiologist  Patient Location:  PACU  Start Time:  7/26/2019 7:20 AM  End Time:  7/26/2019 7:23 AM  Site Identification: ultrasound guided, real time ultrasound guided and IMAGE

## 2019-07-30 PROBLEM — M25.572 LEFT ANKLE PAIN, UNSPECIFIED CHRONICITY: Status: ACTIVE | Noted: 2019-07-30

## 2019-08-04 ENCOUNTER — PATIENT MESSAGE (OUTPATIENT)
Dept: FAMILY MEDICINE CLINIC | Facility: CLINIC | Age: 59
End: 2019-08-04

## 2019-08-04 DIAGNOSIS — Z12.11 SCREENING FOR COLON CANCER: Primary | ICD-10-CM

## 2019-08-05 NOTE — TELEPHONE ENCOUNTER
Please see pended order for gastro. Pt's last OV 7/16/19, for DM check. Last colonoscopy 12/11/13, next one in 5 years.

## 2019-08-05 NOTE — TELEPHONE ENCOUNTER
From: Abdi Roth. To: Young Dallas MD  Sent: 8/4/2019 12:05 PM CDT  Subject: Non-Urgent Medical Question    My colonoscopy was . Am I due for another soon? Thank you.     Alfreda Ovalles

## 2019-10-14 DIAGNOSIS — E11.40 TYPE 2 DIABETES MELLITUS WITH DIABETIC NEUROPATHY, WITHOUT LONG-TERM CURRENT USE OF INSULIN (HCC): ICD-10-CM

## 2019-10-14 DIAGNOSIS — E11.42 DIABETIC POLYNEUROPATHY ASSOCIATED WITH TYPE 2 DIABETES MELLITUS (HCC): ICD-10-CM

## 2019-10-14 DIAGNOSIS — E11.9 TYPE 2 DIABETES MELLITUS WITHOUT COMPLICATION, WITHOUT LONG-TERM CURRENT USE OF INSULIN (HCC): ICD-10-CM

## 2019-10-14 RX ORDER — GLIMEPIRIDE 2 MG/1
TABLET ORAL
Qty: 270 TABLET | Refills: 0 | Status: SHIPPED | OUTPATIENT
Start: 2019-10-14 | End: 2019-12-27 | Stop reason: DRUGHIGH

## 2019-12-03 ENCOUNTER — OFFICE VISIT (OUTPATIENT)
Dept: FAMILY MEDICINE CLINIC | Facility: CLINIC | Age: 59
End: 2019-12-03

## 2019-12-03 VITALS
BODY MASS INDEX: 32.39 KG/M2 | RESPIRATION RATE: 16 BRPM | DIASTOLIC BLOOD PRESSURE: 80 MMHG | HEIGHT: 76 IN | WEIGHT: 266 LBS | TEMPERATURE: 98 F | SYSTOLIC BLOOD PRESSURE: 162 MMHG | HEART RATE: 88 BPM

## 2019-12-03 DIAGNOSIS — H65.01 NON-RECURRENT ACUTE SEROUS OTITIS MEDIA OF RIGHT EAR: Primary | ICD-10-CM

## 2019-12-03 PROCEDURE — 99213 OFFICE O/P EST LOW 20 MIN: CPT | Performed by: FAMILY MEDICINE

## 2019-12-03 RX ORDER — FLUTICASONE PROPIONATE 50 MCG
2 SPRAY, SUSPENSION (ML) NASAL DAILY
Qty: 1 BOTTLE | Refills: 3 | Status: SHIPPED | OUTPATIENT
Start: 2019-12-03 | End: 2020-02-18 | Stop reason: ALTCHOICE

## 2019-12-03 NOTE — PROGRESS NOTES
Abdi Roth. is a 62year old male. HPI:   Patient is a 47-year-old male who complains of ringing and clicking in his right ear. He has had some 10 days. He states he feels like he needs to have a pop.       Current Outpatient Medications   Medi media of right ear  (primary encounter diagnosis)    No orders of the defined types were placed in this encounter.       Meds & Refills for this Visit:  Requested Prescriptions     Signed Prescriptions Disp Refills   • Fluticasone Propionate 50 MCG/ACT Nasa

## 2019-12-23 ENCOUNTER — OFFICE VISIT (OUTPATIENT)
Dept: FAMILY MEDICINE CLINIC | Facility: CLINIC | Age: 59
End: 2019-12-23

## 2019-12-23 ENCOUNTER — LAB ENCOUNTER (OUTPATIENT)
Dept: LAB | Age: 59
End: 2019-12-23
Attending: FAMILY MEDICINE
Payer: COMMERCIAL

## 2019-12-23 VITALS
RESPIRATION RATE: 16 BRPM | HEIGHT: 76 IN | TEMPERATURE: 97 F | HEART RATE: 80 BPM | WEIGHT: 272 LBS | DIASTOLIC BLOOD PRESSURE: 78 MMHG | SYSTOLIC BLOOD PRESSURE: 158 MMHG | BODY MASS INDEX: 33.12 KG/M2

## 2019-12-23 DIAGNOSIS — E11.40 TYPE 2 DIABETES MELLITUS WITH DIABETIC NEUROPATHY, WITHOUT LONG-TERM CURRENT USE OF INSULIN (HCC): Primary | ICD-10-CM

## 2019-12-23 DIAGNOSIS — E11.9 DIABETES MELLITUS (HCC): Primary | ICD-10-CM

## 2019-12-23 DIAGNOSIS — Z12.5 SPECIAL SCREENING FOR MALIGNANT NEOPLASM OF PROSTATE: ICD-10-CM

## 2019-12-23 DIAGNOSIS — I15.2 HYPERTENSION ASSOCIATED WITH DIABETES (HCC): ICD-10-CM

## 2019-12-23 DIAGNOSIS — Z12.0 ENCOUNTER FOR SCREENING FOR MALIGNANT NEOPLASM OF STOMACH: ICD-10-CM

## 2019-12-23 DIAGNOSIS — E11.59 HYPERTENSION ASSOCIATED WITH DIABETES (HCC): ICD-10-CM

## 2019-12-23 DIAGNOSIS — M14.60 CHARCOT'S ARTHROPATHY: ICD-10-CM

## 2019-12-23 PROCEDURE — 80053 COMPREHEN METABOLIC PANEL: CPT

## 2019-12-23 PROCEDURE — 36415 COLL VENOUS BLD VENIPUNCTURE: CPT

## 2019-12-23 PROCEDURE — 99214 OFFICE O/P EST MOD 30 MIN: CPT | Performed by: FAMILY MEDICINE

## 2019-12-23 PROCEDURE — 84153 ASSAY OF PSA TOTAL: CPT

## 2019-12-23 PROCEDURE — 83036 HEMOGLOBIN GLYCOSYLATED A1C: CPT

## 2019-12-23 RX ORDER — LOSARTAN POTASSIUM 100 MG/1
100 TABLET ORAL DAILY
Qty: 90 TABLET | Refills: 1 | Status: SHIPPED | OUTPATIENT
Start: 2019-12-23 | End: 2020-05-26

## 2019-12-23 NOTE — PROGRESS NOTES
HPI:   Sandra Kinney. is a 62year old male who presents for recheck of his diabetes. He admits that he has been traveling frequently and his diet is not what it should be. Patient’s FBS have been 130-150's.  His last hemoglobin A1c was 7.8 on 6/21/ 68     LDL CHOLESTROL (mg/dL)   Date Value   03/10/2014 59   01/09/2013 92   09/07/2012 77     AST (U/L)   Date Value   12/23/2019 15   06/28/2019 11 (L)   11/30/2018 14 (L)   03/10/2014 15   04/12/2013 12 (L)   01/09/2013 10 (L)     ALT (U/L)   Date Value OR   • FOOT JOINT FUSION Left 7/26/2019    Performed by Anderson Harrison MD at [address]   • [address] Left 3/31/2017    Performed by Lizbet Jimenez DPM at [address] [facility] OR   • [address] [facility] [place] Left 10/19/2018    Performed by Ana Ramos reduced bilaterally  NEURO: Monofilament testing done.   Sensation reduced medially    Patient will have new labs done today including a CMP, PSA, and hemoglobin A1c    ASSESSMENT AND PLAN:   Type 2 diabetes mellitus with diabetic neuropathy, without long-t

## 2019-12-26 ENCOUNTER — MED REC SCAN ONLY (OUTPATIENT)
Dept: FAMILY MEDICINE CLINIC | Facility: CLINIC | Age: 59
End: 2019-12-26

## 2019-12-27 DIAGNOSIS — E11.42 DIABETIC POLYNEUROPATHY ASSOCIATED WITH TYPE 2 DIABETES MELLITUS (HCC): ICD-10-CM

## 2019-12-27 DIAGNOSIS — E11.40 TYPE 2 DIABETES MELLITUS WITH DIABETIC NEUROPATHY, WITHOUT LONG-TERM CURRENT USE OF INSULIN (HCC): ICD-10-CM

## 2019-12-27 RX ORDER — GLIMEPIRIDE 4 MG/1
4 TABLET ORAL
Qty: 180 TABLET | Refills: 1 | Status: SHIPPED | OUTPATIENT
Start: 2019-12-27 | End: 2020-02-18

## 2020-01-07 DIAGNOSIS — E11.9 TYPE 2 DIABETES MELLITUS WITHOUT COMPLICATION, WITHOUT LONG-TERM CURRENT USE OF INSULIN (HCC): ICD-10-CM

## 2020-01-07 RX ORDER — GLIMEPIRIDE 2 MG/1
TABLET ORAL
Qty: 270 TABLET | Refills: 0 | Status: SHIPPED | OUTPATIENT
Start: 2020-01-07 | End: 2020-03-30

## 2020-02-11 DIAGNOSIS — E11.40 TYPE 2 DIABETES MELLITUS WITH DIABETIC NEUROPATHY, WITHOUT LONG-TERM CURRENT USE OF INSULIN (HCC): ICD-10-CM

## 2020-02-11 DIAGNOSIS — E11.42 DIABETIC POLYNEUROPATHY ASSOCIATED WITH TYPE 2 DIABETES MELLITUS (HCC): ICD-10-CM

## 2020-02-14 DIAGNOSIS — E11.42 DIABETIC POLYNEUROPATHY ASSOCIATED WITH TYPE 2 DIABETES MELLITUS (HCC): ICD-10-CM

## 2020-02-14 DIAGNOSIS — E11.40 TYPE 2 DIABETES MELLITUS WITH DIABETIC NEUROPATHY, WITHOUT LONG-TERM CURRENT USE OF INSULIN (HCC): ICD-10-CM

## 2020-02-18 ENCOUNTER — OFFICE VISIT (OUTPATIENT)
Dept: FAMILY MEDICINE CLINIC | Facility: CLINIC | Age: 60
End: 2020-02-18

## 2020-02-18 VITALS
DIASTOLIC BLOOD PRESSURE: 72 MMHG | SYSTOLIC BLOOD PRESSURE: 136 MMHG | RESPIRATION RATE: 16 BRPM | HEIGHT: 76 IN | TEMPERATURE: 98 F | WEIGHT: 275 LBS | BODY MASS INDEX: 33.49 KG/M2 | HEART RATE: 80 BPM

## 2020-02-18 DIAGNOSIS — M14.60 CHARCOT'S ARTHROPATHY: ICD-10-CM

## 2020-02-18 DIAGNOSIS — E11.59 HYPERTENSION ASSOCIATED WITH DIABETES (HCC): ICD-10-CM

## 2020-02-18 DIAGNOSIS — Z13.220 SCREENING FOR LIPOID DISORDERS: ICD-10-CM

## 2020-02-18 DIAGNOSIS — I15.2 HYPERTENSION ASSOCIATED WITH DIABETES (HCC): ICD-10-CM

## 2020-02-18 DIAGNOSIS — E11.40 TYPE 2 DIABETES MELLITUS WITH DIABETIC NEUROPATHY, WITHOUT LONG-TERM CURRENT USE OF INSULIN (HCC): Primary | ICD-10-CM

## 2020-02-18 DIAGNOSIS — E11.42 DIABETIC POLYNEUROPATHY ASSOCIATED WITH TYPE 2 DIABETES MELLITUS (HCC): ICD-10-CM

## 2020-02-18 PROCEDURE — 99214 OFFICE O/P EST MOD 30 MIN: CPT | Performed by: FAMILY MEDICINE

## 2020-02-19 NOTE — PROGRESS NOTES
HPI:   Romie Shanks. is a 62year old male who presents for recheck of his diabetes. He admits that he has been traveling frequently and his diet is not what it should be. Patient’s FBS have been 130-150's.  His last hemoglobin A1c was 7.9 on 12/23 (mg/dL)   Date Value   03/10/2014 59   01/09/2013 92   09/07/2012 77     AST (U/L)   Date Value   12/23/2019 15   06/28/2019 11 (L)   11/30/2018 14 (L)   03/10/2014 15   04/12/2013 12 (L)   01/09/2013 10 (L)     ALT (U/L)   Date Value   12/23/2019 24   06/ MAIN OR   • FOOT JOINT FUSION Left 7/26/2019    Performed by Re Delgado MD at [address] [facility]   • [address] Left 3/31/2017    Performed by Gemma Saldana DPM at [facility] MAIN OR   • [address] [place] [address] [place] Left 10/19/2018    Performed by Antonina Bo medially      ASSESSMENT AND PLAN:   Type 2 diabetes mellitus with diabetic neuropathy, without long-term current use of insulin (hcc)  (primary encounter diagnosis)  Diabetic polyneuropathy associated with type 2 diabetes mellitus (hcc)  Screening for lip

## 2020-02-24 ENCOUNTER — PATIENT OUTREACH (OUTPATIENT)
Dept: FAMILY MEDICINE CLINIC | Facility: CLINIC | Age: 60
End: 2020-02-24

## 2020-02-24 NOTE — PROGRESS NOTES
Patient due for DM eye exam- previously saw Benoit Vanegas.  Would like patient to follow up with Benoit Vanegas for his annual diabetic eye exam--if he has had one in the past 12 months please see if we can obtain a copy of this to update his eye e

## 2020-03-30 DIAGNOSIS — E11.9 TYPE 2 DIABETES MELLITUS WITHOUT COMPLICATION, WITHOUT LONG-TERM CURRENT USE OF INSULIN (HCC): ICD-10-CM

## 2020-03-30 RX ORDER — GLIMEPIRIDE 2 MG/1
TABLET ORAL
Qty: 270 TABLET | Refills: 0 | Status: SHIPPED
Start: 2020-03-30 | End: 2020-06-19

## 2020-03-30 NOTE — TELEPHONE ENCOUNTER
Received request for refill on glimepiride 2mg tabs.   Last OV 2/18/20 last refill 1/7/20 #270 ( pt taking 3 tabs daily)

## 2020-05-26 DIAGNOSIS — I15.2 HYPERTENSION ASSOCIATED WITH DIABETES (HCC): ICD-10-CM

## 2020-05-26 DIAGNOSIS — E11.59 HYPERTENSION ASSOCIATED WITH DIABETES (HCC): ICD-10-CM

## 2020-05-26 RX ORDER — LOSARTAN POTASSIUM 100 MG/1
TABLET ORAL
Qty: 90 TABLET | Refills: 0 | Status: SHIPPED | OUTPATIENT
Start: 2020-05-26 | End: 2020-08-14

## 2020-06-19 DIAGNOSIS — E11.9 TYPE 2 DIABETES MELLITUS WITHOUT COMPLICATION, WITHOUT LONG-TERM CURRENT USE OF INSULIN (HCC): ICD-10-CM

## 2020-06-19 RX ORDER — GLIMEPIRIDE 2 MG/1
TABLET ORAL
Qty: 270 TABLET | Refills: 0 | Status: SHIPPED | OUTPATIENT
Start: 2020-06-19 | End: 2020-06-22

## 2020-06-19 RX ORDER — GLIMEPIRIDE 2 MG/1
TABLET ORAL
Qty: 270 TABLET | Refills: 0 | OUTPATIENT
Start: 2020-06-19

## 2020-06-22 RX ORDER — GLIMEPIRIDE 2 MG/1
TABLET ORAL
Qty: 270 TABLET | Refills: 0 | Status: SHIPPED | OUTPATIENT
Start: 2020-06-22 | End: 2020-09-08

## 2020-07-16 ENCOUNTER — PATIENT MESSAGE (OUTPATIENT)
Dept: FAMILY MEDICINE CLINIC | Facility: CLINIC | Age: 60
End: 2020-07-16

## 2020-07-16 NOTE — TELEPHONE ENCOUNTER
From: [address]   To: Devan Simms MD  Sent: 7/16/2020 2:05 PM CDT  Subject: Non-Urgent Medical Question    Dr. Baldomero Moyer, I have some concerns about my diabetes and want to get a referral to see an specialist, possibly an endocrinologist.

## 2020-07-31 DIAGNOSIS — E11.40 TYPE 2 DIABETES MELLITUS WITH DIABETIC NEUROPATHY, WITHOUT LONG-TERM CURRENT USE OF INSULIN (HCC): ICD-10-CM

## 2020-07-31 DIAGNOSIS — E11.42 DIABETIC POLYNEUROPATHY ASSOCIATED WITH TYPE 2 DIABETES MELLITUS (HCC): ICD-10-CM

## 2020-08-13 ENCOUNTER — APPOINTMENT (OUTPATIENT)
Dept: LAB | Age: 60
End: 2020-08-13
Attending: FAMILY MEDICINE
Payer: COMMERCIAL

## 2020-08-13 DIAGNOSIS — E11.40 TYPE 2 DIABETES MELLITUS WITH DIABETIC NEUROPATHY, WITHOUT LONG-TERM CURRENT USE OF INSULIN (HCC): ICD-10-CM

## 2020-08-13 DIAGNOSIS — E11.59 HYPERTENSION ASSOCIATED WITH DIABETES (HCC): ICD-10-CM

## 2020-08-13 DIAGNOSIS — Z13.220 SCREENING FOR LIPOID DISORDERS: ICD-10-CM

## 2020-08-13 DIAGNOSIS — I15.2 HYPERTENSION ASSOCIATED WITH DIABETES (HCC): ICD-10-CM

## 2020-08-13 LAB
ALBUMIN SERPL-MCNC: 3.7 G/DL (ref 3.4–5)
ALBUMIN/GLOB SERPL: 1 {RATIO} (ref 1–2)
ALP LIVER SERPL-CCNC: 87 U/L (ref 45–117)
ALT SERPL-CCNC: 24 U/L (ref 16–61)
ANION GAP SERPL CALC-SCNC: 3 MMOL/L (ref 0–18)
AST SERPL-CCNC: 12 U/L (ref 15–37)
BILIRUB SERPL-MCNC: 0.3 MG/DL (ref 0.1–2)
BUN BLD-MCNC: 23 MG/DL (ref 7–18)
BUN/CREAT SERPL: 18 (ref 10–20)
CALCIUM BLD-MCNC: 9.6 MG/DL (ref 8.5–10.1)
CHLORIDE SERPL-SCNC: 105 MMOL/L (ref 98–112)
CHOLEST SMN-MCNC: 142 MG/DL (ref ?–200)
CO2 SERPL-SCNC: 28 MMOL/L (ref 21–32)
CREAT BLD-MCNC: 1.28 MG/DL (ref 0.7–1.3)
CREAT UR-SCNC: 119 MG/DL
EST. AVERAGE GLUCOSE BLD GHB EST-MCNC: 180 MG/DL (ref 68–126)
GLOBULIN PLAS-MCNC: 3.8 G/DL (ref 2.8–4.4)
GLUCOSE BLD-MCNC: 184 MG/DL (ref 70–99)
HBA1C MFR BLD HPLC: 7.9 % (ref ?–5.7)
HDLC SERPL-MCNC: 36 MG/DL (ref 40–59)
LDLC SERPL CALC-MCNC: 72 MG/DL (ref ?–100)
M PROTEIN MFR SERPL ELPH: 7.5 G/DL (ref 6.4–8.2)
MICROALBUMIN UR-MCNC: 1.23 MG/DL
MICROALBUMIN/CREAT 24H UR-RTO: 10.3 UG/MG (ref ?–30)
NONHDLC SERPL-MCNC: 106 MG/DL (ref ?–130)
OSMOLALITY SERPL CALC.SUM OF ELEC: 290 MOSM/KG (ref 275–295)
PATIENT FASTING Y/N/NP: YES
PATIENT FASTING Y/N/NP: YES
POTASSIUM SERPL-SCNC: 4.9 MMOL/L (ref 3.5–5.1)
SODIUM SERPL-SCNC: 136 MMOL/L (ref 136–145)
TRIGL SERPL-MCNC: 168 MG/DL (ref 30–149)
VLDLC SERPL CALC-MCNC: 34 MG/DL (ref 0–30)

## 2020-08-13 PROCEDURE — 80053 COMPREHEN METABOLIC PANEL: CPT

## 2020-08-13 PROCEDURE — 82043 UR ALBUMIN QUANTITATIVE: CPT

## 2020-08-13 PROCEDURE — 83036 HEMOGLOBIN GLYCOSYLATED A1C: CPT

## 2020-08-13 PROCEDURE — 36415 COLL VENOUS BLD VENIPUNCTURE: CPT

## 2020-08-13 PROCEDURE — 82570 ASSAY OF URINE CREATININE: CPT

## 2020-08-13 PROCEDURE — 80061 LIPID PANEL: CPT

## 2020-08-14 ENCOUNTER — OFFICE VISIT (OUTPATIENT)
Dept: FAMILY MEDICINE CLINIC | Facility: CLINIC | Age: 60
End: 2020-08-14

## 2020-08-14 VITALS
HEART RATE: 88 BPM | BODY MASS INDEX: 33.97 KG/M2 | DIASTOLIC BLOOD PRESSURE: 70 MMHG | HEIGHT: 76 IN | SYSTOLIC BLOOD PRESSURE: 130 MMHG | WEIGHT: 279 LBS | RESPIRATION RATE: 16 BRPM | TEMPERATURE: 97 F

## 2020-08-14 DIAGNOSIS — I15.2 HYPERTENSION ASSOCIATED WITH DIABETES (HCC): ICD-10-CM

## 2020-08-14 DIAGNOSIS — E11.59 HYPERTENSION ASSOCIATED WITH DIABETES (HCC): ICD-10-CM

## 2020-08-14 DIAGNOSIS — E11.40 TYPE 2 DIABETES MELLITUS WITH DIABETIC NEUROPATHY, WITHOUT LONG-TERM CURRENT USE OF INSULIN (HCC): Primary | ICD-10-CM

## 2020-08-14 DIAGNOSIS — E11.42 DIABETIC POLYNEUROPATHY ASSOCIATED WITH TYPE 2 DIABETES MELLITUS (HCC): ICD-10-CM

## 2020-08-14 DIAGNOSIS — Z12.5 SPECIAL SCREENING FOR MALIGNANT NEOPLASM OF PROSTATE: ICD-10-CM

## 2020-08-14 DIAGNOSIS — M14.60 CHARCOT'S ARTHROPATHY: ICD-10-CM

## 2020-08-14 PROCEDURE — 99214 OFFICE O/P EST MOD 30 MIN: CPT | Performed by: FAMILY MEDICINE

## 2020-08-14 PROCEDURE — 3078F DIAST BP <80 MM HG: CPT | Performed by: FAMILY MEDICINE

## 2020-08-14 PROCEDURE — 3075F SYST BP GE 130 - 139MM HG: CPT | Performed by: FAMILY MEDICINE

## 2020-08-14 PROCEDURE — 3008F BODY MASS INDEX DOCD: CPT | Performed by: FAMILY MEDICINE

## 2020-08-14 RX ORDER — LOSARTAN POTASSIUM 100 MG/1
TABLET ORAL
Qty: 90 TABLET | Refills: 1 | Status: SHIPPED | OUTPATIENT
Start: 2020-08-14 | End: 2021-01-04

## 2020-08-14 NOTE — PROGRESS NOTES
HPI:   Brandon Britt. is a 62year old male who presents for recheck of his diabetes. He admits that he has been traveling frequently and his diet is not what it should be. Patient’s FBS have been 130-150's.  His last hemoglobin A1c was 7.9 on 8/13/ (mg/dL)   Date Value   03/10/2014 59   01/09/2013 92   09/07/2012 77     AST (U/L)   Date Value   08/13/2020 12 (L)   12/23/2019 15   06/28/2019 11 (L)   03/10/2014 15   04/12/2013 12 (L)   01/09/2013 10 (L)     ALT (U/L)   Date Value   08/13/2020 24   12/ 10/19/2018    Performed by Josselin Franco MD at [address] [facility]   • COLONOSCOPY     • EXTREMITY LOWER HARDWARE REMOVAL Left 7/26/2019    Performed by Josselin Franco MD at [address] [facility] MAIN OR   • FOOT JOINT FUSION Left 7/26/2019    Performed by Dennis masses, HSM or tenderness  EXTREMITIES: No cyanosis clubbing or edema, the right great toe is status post partial amputation. DP pulses seem reduced bilaterally  NEURO: Monofilament testing done.   Sensation reduced medially      ASSESSMENT AND PLAN:   Typ

## 2020-09-08 DIAGNOSIS — E11.9 TYPE 2 DIABETES MELLITUS WITHOUT COMPLICATION, WITHOUT LONG-TERM CURRENT USE OF INSULIN (HCC): ICD-10-CM

## 2020-09-08 RX ORDER — GLIMEPIRIDE 2 MG/1
TABLET ORAL
Qty: 270 TABLET | Refills: 1 | Status: SHIPPED | OUTPATIENT
Start: 2020-09-08 | End: 2021-03-22

## 2020-09-20 ENCOUNTER — PATIENT OUTREACH (OUTPATIENT)
Dept: FAMILY MEDICINE CLINIC | Facility: CLINIC | Age: 60
End: 2020-09-20

## 2020-09-20 DIAGNOSIS — Z12.11 SCREEN FOR COLON CANCER: Primary | ICD-10-CM

## 2020-09-24 ENCOUNTER — PATIENT MESSAGE (OUTPATIENT)
Dept: FAMILY MEDICINE CLINIC | Facility: CLINIC | Age: 60
End: 2020-09-24

## 2020-10-02 NOTE — TELEPHONE ENCOUNTER
It doesn't look like we have received his eye exam.  Please call this office to ask them to fax again. Once received, please give to [facility] to abstract then to Dr. Madhuri Phillip to [place].

## 2020-10-07 NOTE — TELEPHONE ENCOUNTER
Spoke to Yolanda at [facility]. She states she will forward message to [facility] staff to send diabetic eye exam to our office.

## 2020-10-14 ENCOUNTER — TELEPHONE (OUTPATIENT)
Dept: PHYSICAL THERAPY | Age: 60
End: 2020-10-14

## 2020-10-16 ENCOUNTER — TELEPHONE (OUTPATIENT)
Dept: FAMILY MEDICINE CLINIC | Facility: CLINIC | Age: 60
End: 2020-10-16

## 2020-10-16 DIAGNOSIS — E11.42 DIABETIC POLYNEUROPATHY ASSOCIATED WITH TYPE 2 DIABETES MELLITUS (HCC): ICD-10-CM

## 2020-10-16 DIAGNOSIS — E11.40 TYPE 2 DIABETES MELLITUS WITH DIABETIC NEUROPATHY, WITHOUT LONG-TERM CURRENT USE OF INSULIN (HCC): ICD-10-CM

## 2020-10-20 ENCOUNTER — OFFICE VISIT (OUTPATIENT)
Dept: PHYSICAL THERAPY | Age: 60
End: 2020-10-20
Attending: ORTHOPAEDIC SURGERY
Payer: COMMERCIAL

## 2020-10-20 DIAGNOSIS — M14.60 CHARCOT'S ARTHROPATHY: ICD-10-CM

## 2020-10-20 DIAGNOSIS — M19.272 OTHER SECONDARY OSTEOARTHRITIS OF LEFT FOOT: ICD-10-CM

## 2020-10-20 PROCEDURE — 97162 PT EVAL MOD COMPLEX 30 MIN: CPT

## 2020-10-20 PROCEDURE — 97110 THERAPEUTIC EXERCISES: CPT

## 2020-10-20 NOTE — PROGRESS NOTES
LOWER EXTREMITY EVALUATION:   Referring Physician: Dr. Jonah Sellers  Diagnosis: Sx 10/19/18, Left 1st and 2nd TMT Realignment Arthrodesis w/ Dr. Michael Hernandez Exp 1/17/19 ;  Charcot's arthropathy ;Gait disturbance    Date of Service: 10/20/2020     PATIENT made.   Goals: Playing golf, walk normally. Work and recreational activities work- [employer], [employer];  of [employer]. Worked from home for 20 years. In front of a computer. A lot of sitting. Past medical history was reviewed with Mildred Britt.  Sign bilaterally; worse on the medial foot/arch on the L    A/PROM: (* denotes performed with pain)  Hip Knee Foot/Ankle   WFL able to cross legs to complete there ex for foot  -    DF: R 3/10; L 0/0  PF: R 55; L 50  INV: R 30; L 30  EV: R 20; L 15  Great toe e DF AROM to >= 10 degrees to promote proper foot clearance during gait and greater ease descending stairs without compensation  · Pt will have increased ankle strength to 5/5 throughout for improved ankle control with ADLs such as prolonged gait and stair n

## 2020-10-22 ENCOUNTER — OFFICE VISIT (OUTPATIENT)
Dept: PHYSICAL THERAPY | Age: 60
End: 2020-10-22
Attending: ORTHOPAEDIC SURGERY
Payer: COMMERCIAL

## 2020-10-22 PROCEDURE — 97110 THERAPEUTIC EXERCISES: CPT

## 2020-10-22 PROCEDURE — 97140 MANUAL THERAPY 1/> REGIONS: CPT

## 2020-10-22 NOTE — PROGRESS NOTES
Dx: Sx 10/19/18, Left 1st and 2nd TMT Realignment Arthrodesis w/ Dr. Villaseñor Courser Exp 1/17/19 ;Charcot's arthropathy ;Gait disturbance          Insurance (Authorized # of Visits):   O 8 visits authorized   Authorizing Physician: Dr. Keyla Solitario MD vis There ex:   Arch lifts 10 reps x 2 sets R/L   Supine gastroc stretch 30 sec hold x 4 sets R/L   Standing slant board stretch 30 sec hold x 3 sets   Standing double leg heel raises 20 reps x 2 sets              Gait:   Post treatment VCs provided but mini

## 2020-10-28 ENCOUNTER — OFFICE VISIT (OUTPATIENT)
Dept: PHYSICAL THERAPY | Age: 60
End: 2020-10-28
Attending: ORTHOPAEDIC SURGERY
Payer: COMMERCIAL

## 2020-10-28 PROCEDURE — 97140 MANUAL THERAPY 1/> REGIONS: CPT

## 2020-10-28 PROCEDURE — 97110 THERAPEUTIC EXERCISES: CPT

## 2020-10-28 NOTE — PROGRESS NOTES
Dx: Sx 10/19/18, Left 1st and 2nd TMT Realignment Arthrodesis w/ Dr. Diego Marking Exp 1/17/19 ;Charcot's arthropathy ;Gait disturbance          Insurance (Authorized # of Visits):   [facility] 8 visits authorized   Authorizing Physician: Dr. Mary Jo Solitario MD vis x 3 sets R/L  Manual great toe flexion stretch at MTP 10 sec hold 10 reps on the R     Manual:   TC joint distraction gr IV 10 reps x 2 sets R/L  TC anterior glide with DF gr IV 10 reps x 3 sets R/L  Manual great toe flexion stretch at MTP 10 sec hold 10 r

## 2020-11-02 ENCOUNTER — OFFICE VISIT (OUTPATIENT)
Dept: PHYSICAL THERAPY | Age: 60
End: 2020-11-02
Attending: ORTHOPAEDIC SURGERY
Payer: COMMERCIAL

## 2020-11-02 PROCEDURE — 97110 THERAPEUTIC EXERCISES: CPT

## 2020-11-03 NOTE — PROGRESS NOTES
Dx: Sx 10/19/18, Left 1st and 2nd TMT Realignment Arthrodesis w/ Dr. Luciano Outlaw Exp 1/17/19 ;Charcot's arthropathy ;Gait disturbance          Insurance (Authorized # of Visits):   [facility] 8 visits authorized   Authorizing Physician: Dr. Sherice Solitario MD vis distraction gr IV 10 reps x 2 sets R/L  TC anterior glide with DF gr IV 10 reps x 3 sets R/L  Manual great toe flexion stretch at MTP 10 sec hold 10 reps on the R Manual:   TC joint distraction gr IV 10 reps x 2 sets R/L  TC anterior glide with DF gr IV 10 lifts; calf stretch; heel raises, inversion with red band; gait practice   10/28/2020 heel raise focus: 1 R toe flexion during heel raise; 2 increased WB on the L to increase resistance  Charges:  There ex: 3   Total Timed Treatment: 40 min  Total Treatment

## 2020-11-04 ENCOUNTER — OFFICE VISIT (OUTPATIENT)
Dept: PHYSICAL THERAPY | Age: 60
End: 2020-11-04
Attending: ORTHOPAEDIC SURGERY
Payer: COMMERCIAL

## 2020-11-04 PROCEDURE — 97110 THERAPEUTIC EXERCISES: CPT

## 2020-11-04 PROCEDURE — 97140 MANUAL THERAPY 1/> REGIONS: CPT

## 2020-11-04 NOTE — PROGRESS NOTES
Dx: Sx 10/19/18, Left 1st and 2nd TMT Realignment Arthrodesis w/ Dr. Michael Hernandez Exp 1/17/19 ;Charcot's arthropathy ;Gait disturbance          Insurance (Authorized # of Visits):   O 8 visits authorized   Authorizing Physician: Dr. Jonah Solitario MD vis reps x 2 sets R/L  TC anterior glide with DF gr IV 10 reps x 3 sets R/L  Manual great toe flexion stretch at MTP 10 sec hold 10 reps on the R     Manual:   TC joint distraction gr IV 10 reps x 2 sets R/L  TC anterior glide with DF gr IV 10 reps x 3 sets R/ stretch 30 sec hold x 3 sets   Seated toe scrunches 20 reps   Seated rockerboard INV/EV 20 reps   Rockerboard standing static 2 min   Rockerboard standing forward/back 2 min - control with L     Gait:   Post treatment VCs provided but minimal required, sanjuanita

## 2020-11-09 ENCOUNTER — OFFICE VISIT (OUTPATIENT)
Dept: PHYSICAL THERAPY | Age: 60
End: 2020-11-09
Attending: ORTHOPAEDIC SURGERY
Payer: COMMERCIAL

## 2020-11-09 PROCEDURE — 97140 MANUAL THERAPY 1/> REGIONS: CPT

## 2020-11-09 PROCEDURE — 97110 THERAPEUTIC EXERCISES: CPT

## 2020-11-09 NOTE — PROGRESS NOTES
Dx: Sx 10/19/18, Left 1st and 2nd TMT Realignment Arthrodesis w/ Dr. Shar Payan Exp 1/17/19 ;Charcot's arthropathy ;Gait disturbance          Insurance (Authorized # of Visits):   [facility] 8 visits authorized   Authorizing Physician: Dr. Keyana Solitario MD vis anterior glide with DF gr IV 10 reps x 3 sets R/L  Manual great toe flexion stretch at MTP 10 sec hold 10 reps on the R Manual:   TC joint distraction gr IV 10 reps x 2 sets R/L  TC anterior glide with DF gr IV 10 reps x 3 sets R/L Manual:   TC joint distr Rockerboard standing static 2 min   Rockerboard standing forward/back 2 min - control with L  There ex:   Supine calf stretch 30 sec hold x 3 sets  Rockerboard standing forward/back 2 min - control with L   Rocker board medial/lateral 2 min   SLS on foam

## 2020-11-23 ENCOUNTER — TELEPHONE (OUTPATIENT)
Dept: PHYSICAL THERAPY | Age: 60
End: 2020-11-23

## 2020-12-02 ENCOUNTER — APPOINTMENT (OUTPATIENT)
Dept: PHYSICAL THERAPY | Age: 60
End: 2020-12-02
Payer: COMMERCIAL

## 2020-12-07 ENCOUNTER — OFFICE VISIT (OUTPATIENT)
Dept: PHYSICAL THERAPY | Age: 60
End: 2020-12-07
Attending: ORTHOPAEDIC SURGERY
Payer: COMMERCIAL

## 2020-12-07 PROCEDURE — 97110 THERAPEUTIC EXERCISES: CPT

## 2020-12-07 PROCEDURE — 97140 MANUAL THERAPY 1/> REGIONS: CPT

## 2020-12-07 NOTE — PROGRESS NOTES
Dx: Sx 10/19/18, Left 1st and 2nd TMT Realignment Arthrodesis w/ Dr. Luciano Outlaw Exp 1/17/19 ;Charcot's arthropathy ;Gait disturbance          Insurance (Authorized # of Visits):   [facility] 8 visits authorized   Authorizing Physician: Dr. Sherice Solitario MD vis Manual:   TC joint distraction gr IV 10 reps x 2 sets R/L  TC anterior glide with DF gr IV 10 reps x 3 sets R/L  Manual great toe flexion stretch at MTP 10 sec hold 10 reps on the R Manual:   TC joint distraction gr IV 10 reps x 2 sets R/L  TC anterior gli extension; set 2 with focus on increased WB/WS to LLE There ex:   Slant board calf stretch 30 sec hold x 3 sets   Seated toe scrunches 20 reps   Seated rockerboard INV/EV 20 reps   Rockerboard standing static 2 min   Rockerboard standing forward/back 2 min Treatment Time: 40 min

## 2020-12-22 ENCOUNTER — APPOINTMENT (OUTPATIENT)
Dept: PHYSICAL THERAPY | Age: 60
End: 2020-12-22
Attending: ORTHOPAEDIC SURGERY
Payer: COMMERCIAL

## 2020-12-29 ENCOUNTER — OFFICE VISIT (OUTPATIENT)
Dept: PHYSICAL THERAPY | Age: 60
End: 2020-12-29
Attending: ORTHOPAEDIC SURGERY
Payer: COMMERCIAL

## 2020-12-29 PROCEDURE — 97110 THERAPEUTIC EXERCISES: CPT

## 2020-12-29 NOTE — PROGRESS NOTES
Dx: Sx 10/19/18, Left 1st and 2nd TMT Realignment Arthrodesis w/ Dr. Yvette García Exp 1/17/19 ;Charcot's arthropathy ;Gait disturbance          Insurance (Authorized # of Visits):   HMO 8 visits authorized + New year referral pending for January visit R/L  TC anterior glide with DF gr IV 10 reps x 3 sets R/L Manual:   TC joint distraction gr IV 10 reps x 2 sets R/L  TC anterior glide with DF gr IV 10 reps x 3 sets R/L  STM to the EHL tendon on the R; STM to lateral foot on the L; STM to arch on the R 7 stretch on stair 30 sec hold x 3 sets R/L   Rockerboard standing forward/back 2 min - control with L   SLS R/L 30 sec hold x 3 sets R/L  Double leg heel raises 20 reps   Standing toe raises 10 reps  - various deg of weight bearing   Green band INV 20 reps

## 2021-01-04 ENCOUNTER — LAB ENCOUNTER (OUTPATIENT)
Dept: LAB | Age: 61
End: 2021-01-04
Attending: INTERNAL MEDICINE
Payer: COMMERCIAL

## 2021-01-04 ENCOUNTER — OFFICE VISIT (OUTPATIENT)
Dept: PHYSICAL THERAPY | Age: 61
End: 2021-01-04
Attending: ORTHOPAEDIC SURGERY
Payer: COMMERCIAL

## 2021-01-04 DIAGNOSIS — Z01.818 PRE-OP TESTING: ICD-10-CM

## 2021-01-04 DIAGNOSIS — I15.2 HYPERTENSION ASSOCIATED WITH DIABETES (HCC): ICD-10-CM

## 2021-01-04 DIAGNOSIS — E11.59 HYPERTENSION ASSOCIATED WITH DIABETES (HCC): ICD-10-CM

## 2021-01-04 PROCEDURE — 97110 THERAPEUTIC EXERCISES: CPT

## 2021-01-04 RX ORDER — LOSARTAN POTASSIUM 100 MG/1
TABLET ORAL
Qty: 90 TABLET | Refills: 0 | Status: SHIPPED | OUTPATIENT
Start: 2021-01-04 | End: 2021-06-03

## 2021-01-04 NOTE — PROGRESS NOTES
Dx: Sx 10/19/18, Left 1st and 2nd TMT Realignment Arthrodesis w/ Dr. Alis Suggs Exp 1/17/19 ;Charcot's arthropathy ;Gait disturbance          Insurance (Authorized # of Visits):   HMO 8 visits authorized + New year referral pending for January visit distraction gr IV 10 reps x 2 sets R/L  TC anterior glide with DF gr IV 10 reps x 3 sets R/L  TC posterior glide with neutral position gr IV 10 reps x 3 sets R/L Manual:   - Manual:   -     There ex:   Supine calf stretch 30 sec hold x 3 sets  Rockerboard reps on the L only   Seated BAPs forward/backs 20 reps; R/L  Double leg heel raises 20 reps   Single leg heel raises attempt R/L R Unremarkable L unable - palpable contraction       Nu Step level 4- 5 min  X level 6 3.5 min; level 5 1.5 min  X level 5- 5 m

## 2021-01-05 DIAGNOSIS — E11.40 TYPE 2 DIABETES MELLITUS WITH DIABETIC NEUROPATHY, WITHOUT LONG-TERM CURRENT USE OF INSULIN (HCC): ICD-10-CM

## 2021-01-05 DIAGNOSIS — E11.42 DIABETIC POLYNEUROPATHY ASSOCIATED WITH TYPE 2 DIABETES MELLITUS (HCC): ICD-10-CM

## 2021-01-05 LAB — SARS-COV-2 RNA RESP QL NAA+PROBE: NOT DETECTED

## 2021-01-06 PROBLEM — Z80.0 FAMILY HISTORY OF COLON CANCER: Status: ACTIVE | Noted: 2021-01-06

## 2021-01-06 PROBLEM — D12.4 BENIGN NEOPLASM OF DESCENDING COLON: Status: ACTIVE | Noted: 2021-01-06

## 2021-01-06 PROBLEM — D12.3 BENIGN NEOPLASM OF TRANSVERSE COLON: Status: ACTIVE | Noted: 2021-01-06

## 2021-01-06 PROBLEM — D12.0 BENIGN NEOPLASM OF CECUM: Status: ACTIVE | Noted: 2021-01-06

## 2021-01-06 PROBLEM — Z12.11 SPECIAL SCREENING FOR MALIGNANT NEOPLASMS, COLON: Status: ACTIVE | Noted: 2021-01-06

## 2021-01-06 PROCEDURE — 88305 TISSUE EXAM BY PATHOLOGIST: CPT | Performed by: INTERNAL MEDICINE

## 2021-01-11 ENCOUNTER — OFFICE VISIT (OUTPATIENT)
Dept: PHYSICAL THERAPY | Age: 61
End: 2021-01-11
Attending: ORTHOPAEDIC SURGERY
Payer: COMMERCIAL

## 2021-01-11 PROCEDURE — 97110 THERAPEUTIC EXERCISES: CPT

## 2021-01-11 NOTE — PROGRESS NOTES
Dx: Sx 10/19/18, Left 1st and 2nd TMT Realignment Arthrodesis w/ Dr. Price [place] Exp 1/17/19 ;Charcot's arthropathy ;Gait disturbance          Insurance (Authorized # of Visits):   HMO 8 visits authorized + New year referral pending for January visit advised of these findings, precautions, and treatment options and has agreed to actively participate in planning and for this course of care.     Thank you for your referral. If you have any questions, please contact me at Dept: [phone]    Sincerely, heel raises 20 reps   Standing toe raises 10 reps  - various deg of weight bearing   SLS On foam with 2 finger assist BL 1 min R/L   There ex:   Supine calf stretch 30 sec hold x 3 sets  Blue band INV 20 reps  Blue band EV 20 reps   Blue band DF 20 reps

## 2021-01-14 DIAGNOSIS — E11.40 TYPE 2 DIABETES MELLITUS WITH DIABETIC NEUROPATHY, WITHOUT LONG-TERM CURRENT USE OF INSULIN (HCC): ICD-10-CM

## 2021-01-14 RX ORDER — LIRAGLUTIDE 6 MG/ML
0.6 INJECTION SUBCUTANEOUS DAILY
Qty: 3 PEN | Refills: 0 | Status: SHIPPED | OUTPATIENT
Start: 2021-01-14 | End: 2021-04-14

## 2021-02-15 DIAGNOSIS — E11.9 TYPE 2 DIABETES MELLITUS WITHOUT COMPLICATION, WITHOUT LONG-TERM CURRENT USE OF INSULIN (HCC): ICD-10-CM

## 2021-02-15 NOTE — TELEPHONE ENCOUNTER
Pt needs appt before any refills. Please have them call to schedule. Thanks   I sent reminder last month. Thanks.     Return in about 6 months (around 2/14/2021) for Hypertension, Hypercholesterolemia, NIDDM

## 2021-03-12 ENCOUNTER — PATIENT MESSAGE (OUTPATIENT)
Dept: FAMILY MEDICINE CLINIC | Facility: CLINIC | Age: 61
End: 2021-03-12

## 2021-03-12 NOTE — TELEPHONE ENCOUNTER
From: Ivory Dong.   To: Mavis Barcenas MD  Sent: 3/12/2021 9:10 AM CST  Subject: Non-Urgent Medical Question    Dr. Dhaval Mattson, My youngest Robin, who you have seen, is struggling with some mental health issues and I need to get him some profess

## 2021-03-12 NOTE — TELEPHONE ENCOUNTER
From: Lena Montes. To: Karen Holley MD  Sent: 3/12/2021 3:38 PM CST  Subject: Non-Urgent Medical Question    Andrzej Dunaway, I am afraid that my phone number in the system maybe wrong. I don't want to miss Dr. Rob Carlson call.  Please give him [phone]

## 2021-03-15 RX ORDER — GLIMEPIRIDE 2 MG/1
TABLET ORAL
Qty: 270 TABLET | Refills: 1 | OUTPATIENT
Start: 2021-03-15

## 2021-03-22 RX ORDER — GLIMEPIRIDE 2 MG/1
TABLET ORAL
Qty: 270 TABLET | Refills: 0 | Status: SHIPPED | OUTPATIENT
Start: 2021-03-22 | End: 2021-06-03

## 2021-04-01 ENCOUNTER — PATIENT MESSAGE (OUTPATIENT)
Dept: FAMILY MEDICINE CLINIC | Facility: CLINIC | Age: 61
End: 2021-04-01

## 2021-04-01 ENCOUNTER — OFFICE VISIT (OUTPATIENT)
Dept: FAMILY MEDICINE CLINIC | Facility: CLINIC | Age: 61
End: 2021-04-01

## 2021-04-01 VITALS
HEART RATE: 84 BPM | RESPIRATION RATE: 16 BRPM | BODY MASS INDEX: 33.24 KG/M2 | DIASTOLIC BLOOD PRESSURE: 70 MMHG | SYSTOLIC BLOOD PRESSURE: 152 MMHG | TEMPERATURE: 98 F | HEIGHT: 76 IN | WEIGHT: 273 LBS

## 2021-04-01 DIAGNOSIS — E11.40 TYPE 2 DIABETES MELLITUS WITH DIABETIC NEUROPATHY, WITHOUT LONG-TERM CURRENT USE OF INSULIN (HCC): Primary | ICD-10-CM

## 2021-04-01 DIAGNOSIS — E11.59 HYPERTENSION ASSOCIATED WITH DIABETES (HCC): ICD-10-CM

## 2021-04-01 DIAGNOSIS — M14.60 CHARCOT'S ARTHROPATHY: ICD-10-CM

## 2021-04-01 DIAGNOSIS — E11.42 DIABETIC POLYNEUROPATHY ASSOCIATED WITH TYPE 2 DIABETES MELLITUS (HCC): ICD-10-CM

## 2021-04-01 DIAGNOSIS — L97.519 ULCER OF RIGHT FOOT, UNSPECIFIED ULCER STAGE (HCC): ICD-10-CM

## 2021-04-01 DIAGNOSIS — I15.2 HYPERTENSION ASSOCIATED WITH DIABETES (HCC): ICD-10-CM

## 2021-04-01 PROCEDURE — 3077F SYST BP >= 140 MM HG: CPT | Performed by: FAMILY MEDICINE

## 2021-04-01 PROCEDURE — 3078F DIAST BP <80 MM HG: CPT | Performed by: FAMILY MEDICINE

## 2021-04-01 PROCEDURE — 3008F BODY MASS INDEX DOCD: CPT | Performed by: FAMILY MEDICINE

## 2021-04-01 PROCEDURE — 99215 OFFICE O/P EST HI 40 MIN: CPT | Performed by: FAMILY MEDICINE

## 2021-04-01 RX ORDER — AMOXICILLIN AND CLAVULANATE POTASSIUM 875; 125 MG/1; MG/1
1 TABLET, FILM COATED ORAL 2 TIMES DAILY
Qty: 20 TABLET | Refills: 0 | Status: ON HOLD | OUTPATIENT
Start: 2021-04-01 | End: 2021-04-08

## 2021-04-01 NOTE — PROGRESS NOTES
HPI:   Lena Montes. is a 56year old male who presents for recheck of his diabetes. Patient’s FBS have been 110-130's. His last hemoglobin A1c was 7.9 on 8/13/2020. His last LDL-C. was 72 on 8/13/2020.  His last random urine for microalbumin was LDL Cholesterol (mg/dL)   Date Value   08/13/2020 72   06/28/2019 74   11/30/2018 67     LDL CHOLESTROL (mg/dL)   Date Value   03/10/2014 59   01/09/2013 92   09/07/2012 77     AST (U/L)   Date Value   08/13/2020 12 (L)   12/23/2019 15   06/28/2019 11 Bloating     Occasionally   • Constipation     Occasionally   • Diabetic peripheral neuropathy (HCC)    • Flatulence/gas pain/belching     Occasionally   • Frequent use of laxatives     Occasionally   • Hearing loss 1-1-2019    Age related   • Heartburn Comment: Occasionally    Drug use: No    Exercise: minimal.  Diet: watches sugar closely     REVIEW OF SYSTEMS:   GENERAL HEALTH: feels well otherwise  SKIN: denies any unusual skin lesions or rashes  RESPIRATORY: denies shortness of breath with exertion (two) times daily for 10 days. Imaging & Consults:  PODIATRY - INTERNAL  CT FOOT RIGHT (W+WO) (CPT=73702)    Podiatry office contacted and will see patient tomorrow  The patient is asked to repeat labs 3 months.    Patient is asked to return for an of

## 2021-04-02 ENCOUNTER — OFFICE VISIT (OUTPATIENT)
Dept: PODIATRY CLINIC | Facility: CLINIC | Age: 61
End: 2021-04-02

## 2021-04-02 ENCOUNTER — HOSPITAL ENCOUNTER (OUTPATIENT)
Dept: GENERAL RADIOLOGY | Age: 61
Discharge: HOME | End: 2021-04-02
Attending: PODIATRIST
Payer: COMMERCIAL

## 2021-04-02 DIAGNOSIS — L97.419 DIABETIC ULCER OF RIGHT MIDFOOT ASSOCIATED WITH TYPE 2 DIABETES MELLITUS, UNSPECIFIED ULCER STAGE (HCC): ICD-10-CM

## 2021-04-02 DIAGNOSIS — E11.621 DIABETIC ULCER OF RIGHT MIDFOOT ASSOCIATED WITH TYPE 2 DIABETES MELLITUS, UNSPECIFIED ULCER STAGE (HCC): Primary | ICD-10-CM

## 2021-04-02 DIAGNOSIS — L97.511 SKIN ULCER OF TOE OF RIGHT FOOT, LIMITED TO BREAKDOWN OF SKIN (HCC): ICD-10-CM

## 2021-04-02 DIAGNOSIS — L97.419 DIABETIC ULCER OF RIGHT MIDFOOT ASSOCIATED WITH TYPE 2 DIABETES MELLITUS, UNSPECIFIED ULCER STAGE (HCC): Primary | ICD-10-CM

## 2021-04-02 DIAGNOSIS — E11.621 DIABETIC ULCER OF RIGHT MIDFOOT ASSOCIATED WITH TYPE 2 DIABETES MELLITUS, UNSPECIFIED ULCER STAGE (HCC): ICD-10-CM

## 2021-04-02 PROCEDURE — 73620 X-RAY EXAM OF FOOT: CPT | Performed by: PODIATRIST

## 2021-04-02 PROCEDURE — 99203 OFFICE O/P NEW LOW 30 MIN: CPT | Performed by: PODIATRIST

## 2021-04-02 NOTE — TELEPHONE ENCOUNTER
From: Jael Givens. To: Rubio Lockhart MD  Sent: 4/1/2021 8:07 PM CDT  Subject: Prescription Question    I was wondering if Dr. Jm Mckeon had a chance to call in the antibiotic script? If not, please use [facility] in [place].  Ellen Yang

## 2021-04-06 ENCOUNTER — APPOINTMENT (OUTPATIENT)
Dept: GENERAL RADIOLOGY | Facility: HOSPITAL | Age: 61
End: 2021-04-06
Attending: EMERGENCY MEDICINE
Payer: COMMERCIAL

## 2021-04-06 ENCOUNTER — HOSPITAL ENCOUNTER (OUTPATIENT)
Facility: HOSPITAL | Age: 61
Setting detail: OBSERVATION
Discharge: HOME | End: 2021-04-08
Attending: HOSPITALIST | Admitting: INTERNAL MEDICINE
Payer: COMMERCIAL

## 2021-04-06 ENCOUNTER — APPOINTMENT (OUTPATIENT)
Dept: CT IMAGING | Facility: HOSPITAL | Age: 61
End: 2021-04-06
Attending: EMERGENCY MEDICINE
Payer: COMMERCIAL

## 2021-04-06 ENCOUNTER — TELEPHONE (OUTPATIENT)
Dept: FAMILY MEDICINE CLINIC | Facility: CLINIC | Age: 61
End: 2021-04-06

## 2021-04-06 DIAGNOSIS — R10.9 FLANK PAIN: Primary | ICD-10-CM

## 2021-04-06 DIAGNOSIS — M25.552 LEFT HIP PAIN: ICD-10-CM

## 2021-04-06 DIAGNOSIS — R11.2 INTRACTABLE VOMITING WITH NAUSEA, UNSPECIFIED VOMITING TYPE: ICD-10-CM

## 2021-04-06 PROCEDURE — 99220 INITIAL OBSERVATION CARE,LEVL III: CPT | Performed by: INTERNAL MEDICINE

## 2021-04-06 PROCEDURE — 73502 X-RAY EXAM HIP UNI 2-3 VIEWS: CPT | Performed by: EMERGENCY MEDICINE

## 2021-04-06 PROCEDURE — 74175 CTA ABDOMEN W/CONTRAST: CPT | Performed by: EMERGENCY MEDICINE

## 2021-04-06 PROCEDURE — 71275 CT ANGIOGRAPHY CHEST: CPT | Performed by: EMERGENCY MEDICINE

## 2021-04-06 RX ORDER — ONDANSETRON 2 MG/ML
INJECTION INTRAMUSCULAR; INTRAVENOUS
Status: COMPLETED
Start: 2021-04-06 | End: 2021-04-06

## 2021-04-06 RX ORDER — ONDANSETRON 2 MG/ML
4 INJECTION INTRAMUSCULAR; INTRAVENOUS ONCE
Status: COMPLETED | OUTPATIENT
Start: 2021-04-06 | End: 2021-04-06

## 2021-04-06 RX ORDER — SODIUM CHLORIDE 9 MG/ML
1000 INJECTION, SOLUTION INTRAVENOUS ONCE
Status: COMPLETED | OUTPATIENT
Start: 2021-04-06 | End: 2021-04-06

## 2021-04-06 RX ORDER — KETOROLAC TROMETHAMINE 30 MG/ML
15 INJECTION, SOLUTION INTRAMUSCULAR; INTRAVENOUS ONCE
Status: COMPLETED | OUTPATIENT
Start: 2021-04-06 | End: 2021-04-06

## 2021-04-06 RX ORDER — DICYCLOMINE HYDROCHLORIDE 10 MG/ML
20 INJECTION INTRAMUSCULAR ONCE
Status: COMPLETED | OUTPATIENT
Start: 2021-04-06 | End: 2021-04-06

## 2021-04-06 RX ORDER — ACETAMINOPHEN 325 MG/1
650 TABLET ORAL EVERY 6 HOURS PRN
Status: CANCELLED | OUTPATIENT
Start: 2021-04-06

## 2021-04-06 RX ORDER — HYDROMORPHONE HYDROCHLORIDE 1 MG/ML
0.5 INJECTION, SOLUTION INTRAMUSCULAR; INTRAVENOUS; SUBCUTANEOUS ONCE
Status: COMPLETED | OUTPATIENT
Start: 2021-04-06 | End: 2021-04-06

## 2021-04-06 NOTE — TELEPHONE ENCOUNTER
1. What are your symptoms? Patient woke up this morning vomiting, runny nose, muscle pain. Patient has thrown up 8 times and cant eat anything. Out of breath. 2. How long have you been having these symptoms? This morning      3. Have you done anything already to treat your symptoms?      Advil      ADDITIONAL INFO:

## 2021-04-06 NOTE — PROGRESS NOTES
Gerhardt Fleet. is a 56year old male. Patient presents with:  New Patient: diabetic type 2 - BS this  - Last A1C of 7.9 on 8/13/20 - diabetic ulcers, right foot - taking augmentin - denies any pain.          HPI:   This pleasant gentleman prese Frequent use of laxatives     Occasionally   • Hearing loss 1-1-2019    Age related   • Heartburn     Occasionally   • Indigestion     Occasionally   • Sleep apnea     wears CPAP   • Type II or unspecified type diabetes mellitus without mention of complica Years: 38.00        Pack years: 0        Types: Cigarettes        Quit date: 2019        Years since quittin.5      Smokeless tobacco: Never Used      Tobacco comment: 2019    Vaping Use      Vaping Use: Never used    Substance and Sexual Activ dry.   2. Vascular: I cannot readily palpate pulses very easily on the right foot or the left. 3. Neurologic: Patient has diminished pain sensation has diabetic peripheral neuropathy   4.  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

## 2021-04-06 NOTE — ED PROVIDER NOTES
Patient Seen in: [facility] Emergency Department      History   No chief complaint on file.     Stated Complaint: Hip Pain    HPI/Subjective:   HPI    Patient is a 58-year-old male presents emergency room with a sudden onset of some left lower back p TENDON REPAIR/LENGTHENING Left 7/26/2019    Performed by Kristin Benavidez MD at [facility] OR   • APPENDECTOMY     • [address] Left 10/19/2018    Performed by Kristin Benavidez MD at [address] [place] [address] (6' 4\")   Wt 124.3 kg   SpO2 100%   BMI 33.35 kg/m²         Physical Exam    GENERAL: Well-developed, well-nourished male sitting up breathing easily in no apparent distress. Patient is nontoxic in appearance. HEENT: Head is normocephalic, atraumatic.  P Glucose 201 (*)     Sodium 135 (*)     A/G Ratio 0.9 (*)     All other components within normal limits   PTT, ACTIVATED - Abnormal; Notable for the following components:    PTT 25.2 (*)     All other components within normal limits   URINALYSIS WITH CULTUR Finalized by (CST): Shannon Liao MD on 4/06/2021 at 7:47 PM              MDM          Patient had IV line established blood work drawn including CBC, chemistries, BUN/creatinine, and blood sugar which showed evidence of some mild hyperglycemia no oth nausea, vomiting, intractable flank pain left hip pain and the patient's diabetes patient will be admitted to the hospital for further care. Patient has no evidence of any fever no evidence of any elevated white blood cell count.   Patient admitted for fur

## 2021-04-06 NOTE — TELEPHONE ENCOUNTER
Patient's spouse Charity Jules called [facility] Verbal Release verified)    Date of ONSET: yesterday    CURRENTLY:    Pain: Hip Left began last night 8/10, now 7/10, Dull/consistent --> non-position dependent---began as lower back pain yesterday prodominately left side. Cough: present, intermittent coughing to clear lungs    SOB/Chest tightness/Chest pain: Denies chest tightness, denies chest pain. Panting from anxiety and pain    Abdominal Pain: from vomiting--> sore    Headache: present--from vomiting    Fever: Denies, but clammy to touch    Body Ache: present left leg and hip, abdomen, head. Fatigue: present-->modrate to severe    Nausea/Vomiting/Diarrhea: Vomiting 8-9 episodes, bile mucus-like. Denies coffee-ground emesis. Appetite/Hydrating: Hydration-->Decreased Urine Output, mouth is dry. Supportive MEDICATIONS:  Ibuprofen OTC, as directed   Heating pad    Patient is unable to take DM meds r/t consistent vomiting. I advised that patient go to ED now to address possible dehydration and reported pain. Patient's wife will call 911 to transport patient safely to ED. Patient's wife and patient verbalized understanding. No further questions or concerns at this time. Dr. Demetrius Avery to advise.

## 2021-04-06 NOTE — ED INITIAL ASSESSMENT (HPI)
PT PRESENTS TO ED WITH LEFT HIP PAIN THAT RADIATES DOWN LEFT LEG DENIES INJURY, PAIN STARTED TODAY, PT ALSO COMPLAINING OF NASAL CONGESTION AND NAUSEA

## 2021-04-07 ENCOUNTER — APPOINTMENT (OUTPATIENT)
Dept: MRI IMAGING | Facility: HOSPITAL | Age: 61
End: 2021-04-07
Attending: HOSPITALIST
Payer: COMMERCIAL

## 2021-04-07 PROBLEM — M25.552 LEFT HIP PAIN: Status: ACTIVE | Noted: 2021-04-07

## 2021-04-07 PROBLEM — R11.2 INTRACTABLE VOMITING WITH NAUSEA, UNSPECIFIED VOMITING TYPE: Status: ACTIVE | Noted: 2021-04-07

## 2021-04-07 PROCEDURE — 99225 SUBSEQUENT OBSERVATION CARE: CPT | Performed by: INTERNAL MEDICINE

## 2021-04-07 PROCEDURE — 72158 MRI LUMBAR SPINE W/O & W/DYE: CPT | Performed by: HOSPITALIST

## 2021-04-07 RX ORDER — FAMOTIDINE 10 MG
10 TABLET ORAL DAILY
Status: DISCONTINUED | OUTPATIENT
Start: 2021-04-07 | End: 2021-04-08

## 2021-04-07 RX ORDER — KETOROLAC TROMETHAMINE 30 MG/ML
30 INJECTION, SOLUTION INTRAMUSCULAR; INTRAVENOUS EVERY 6 HOURS PRN
Status: DISCONTINUED | OUTPATIENT
Start: 2021-04-07 | End: 2021-04-08

## 2021-04-07 RX ORDER — POLYETHYLENE GLYCOL 3350 17 G/17G
17 POWDER, FOR SOLUTION ORAL DAILY
Status: DISCONTINUED | OUTPATIENT
Start: 2021-04-07 | End: 2021-04-08

## 2021-04-07 RX ORDER — FLUTICASONE PROPIONATE 50 MCG
1 SPRAY, SUSPENSION (ML) NASAL 2 TIMES DAILY
Status: DISCONTINUED | OUTPATIENT
Start: 2021-04-07 | End: 2021-04-08

## 2021-04-07 RX ORDER — BISACODYL 10 MG
10 SUPPOSITORY, RECTAL RECTAL
Status: DISCONTINUED | OUTPATIENT
Start: 2021-04-07 | End: 2021-04-08

## 2021-04-07 RX ORDER — TRAMADOL HYDROCHLORIDE 50 MG/1
50 TABLET ORAL EVERY 6 HOURS PRN
Status: DISCONTINUED | OUTPATIENT
Start: 2021-04-07 | End: 2021-04-08

## 2021-04-07 RX ORDER — ENOXAPARIN SODIUM 100 MG/ML
40 INJECTION SUBCUTANEOUS DAILY
Status: DISCONTINUED | OUTPATIENT
Start: 2021-04-07 | End: 2021-04-08

## 2021-04-07 RX ORDER — BACLOFEN 5 MG/1
5 TABLET ORAL 3 TIMES DAILY PRN
Status: DISCONTINUED | OUTPATIENT
Start: 2021-04-07 | End: 2021-04-08

## 2021-04-07 RX ORDER — ACETAMINOPHEN 500 MG
1000 TABLET ORAL 3 TIMES DAILY
Status: DISCONTINUED | OUTPATIENT
Start: 2021-04-07 | End: 2021-04-08

## 2021-04-07 RX ORDER — MELATONIN
3 NIGHTLY PRN
Status: DISCONTINUED | OUTPATIENT
Start: 2021-04-07 | End: 2021-04-08

## 2021-04-07 RX ORDER — MAGNESIUM CARB/ALUMINUM HYDROX 105-160MG
296 TABLET,CHEWABLE ORAL ONCE
Status: COMPLETED | OUTPATIENT
Start: 2021-04-07 | End: 2021-04-07

## 2021-04-07 RX ORDER — DEXTROSE MONOHYDRATE 25 G/50ML
50 INJECTION, SOLUTION INTRAVENOUS
Status: DISCONTINUED | OUTPATIENT
Start: 2021-04-07 | End: 2021-04-08

## 2021-04-07 RX ORDER — CYCLOBENZAPRINE HCL 5 MG
5 TABLET ORAL 3 TIMES DAILY
Status: DISCONTINUED | OUTPATIENT
Start: 2021-04-07 | End: 2021-04-08

## 2021-04-07 RX ORDER — ONDANSETRON 2 MG/ML
4 INJECTION INTRAMUSCULAR; INTRAVENOUS EVERY 6 HOURS PRN
Status: DISCONTINUED | OUTPATIENT
Start: 2021-04-07 | End: 2021-04-08

## 2021-04-07 RX ORDER — LOSARTAN POTASSIUM 100 MG/1
100 TABLET ORAL DAILY
Status: DISCONTINUED | OUTPATIENT
Start: 2021-04-07 | End: 2021-04-08

## 2021-04-07 RX ORDER — ONDANSETRON 2 MG/ML
4 INJECTION INTRAMUSCULAR; INTRAVENOUS EVERY 4 HOURS PRN
Status: DISCONTINUED | OUTPATIENT
Start: 2021-04-07 | End: 2021-04-07

## 2021-04-07 RX ORDER — SODIUM PHOSPHATE, DIBASIC AND SODIUM PHOSPHATE, MONOBASIC 7; 19 G/133ML; G/133ML
1 ENEMA RECTAL ONCE
Status: DISCONTINUED | OUTPATIENT
Start: 2021-04-07 | End: 2021-04-08

## 2021-04-07 RX ORDER — SODIUM CHLORIDE 9 MG/ML
INJECTION, SOLUTION INTRAVENOUS CONTINUOUS
Status: ACTIVE | OUTPATIENT
Start: 2021-04-07 | End: 2021-04-07

## 2021-04-07 RX ORDER — SODIUM CHLORIDE, SODIUM LACTATE, POTASSIUM CHLORIDE, CALCIUM CHLORIDE 600; 310; 30; 20 MG/100ML; MG/100ML; MG/100ML; MG/100ML
INJECTION, SOLUTION INTRAVENOUS CONTINUOUS
Status: ACTIVE | OUTPATIENT
Start: 2021-04-07 | End: 2021-04-07

## 2021-04-07 RX ORDER — CETIRIZINE HYDROCHLORIDE 10 MG/1
10 TABLET ORAL DAILY
Status: DISCONTINUED | OUTPATIENT
Start: 2021-04-07 | End: 2021-04-08

## 2021-04-07 RX ORDER — PREDNISONE 20 MG/1
40 TABLET ORAL
Status: DISCONTINUED | OUTPATIENT
Start: 2021-04-07 | End: 2021-04-08

## 2021-04-07 NOTE — PLAN OF CARE
Problem: Diabetes/Glucose Control  Goal: Glucose maintained within prescribed range  Description: INTERVENTIONS:  - Monitor Blood Glucose as ordered  - Assess for signs and symptoms of hyperglycemia and hypoglycemia  - Administer ordered medications to m oxygen saturation or ABGs  - Provide Smoking Cessation handout, if applicable  - Encourage broncho-pulmonary hygiene including cough, deep breathe, Incentive Spirometry  - Assess the need for suctioning and perform as needed  - Assess and instruct to repor over-consumption  - Identify factors contributing to increased intake, treat as appropriate  - Monitor I&O, WT and lab values  - Obtain nutritional consult as needed  - Evaluate psychosocial factors contributing to over-consumption  Outcome: Progressing Pressure Ulcer prevention bundle as indicated  Outcome: Progressing  Goal: Oral mucous membranes remain intact  Description: INTERVENTIONS  - Assess oral mucosa and hygiene practices  - Implement preventative oral hygiene regimen  - Implement oral medicate self-care    Outcome: Progressing       Pt a&ox4. Glasses. Room air, o2 >90%. Hx ilan, cpap at home. Tele, NSR. Afebrile. Lovenox. Voids, up SBA w/ walker. Pt complaining of constipation, miralax given. IV fluids.  No complaints of sob, n/v. Complaints of pa

## 2021-04-07 NOTE — H&P
[facility] HOSPITALIST  History and Physical     Daniel Marcial.  Patient Status:  Emergency    1960 MRN [id number]   Location [facility] Attending Shayy Ramirez MD   Hosp Day # 0 PCP Kristine Hopper MD     Chief Occasionally   • Hearing loss 1-1-2019    Age related   • Heartburn     Occasionally   • Indigestion     Occasionally   • Sleep apnea     wears CPAP   • Type II or unspecified type diabetes mellitus without mention of complication, not stated as uncontroll Contributed to her death in 1982        Allergies: No Known Allergies    Medications:  No current facility-administered medications on file prior to encounter.   mupirocin 2 % External Ointment, Apply a small amount to the toe and foot wounds daily after cl murmurs, rubs or gallops. Equal pulses. Chest and Back: mild TTP over L paraspinal muscles  Abdomen: Soft, nontender, mildly distended. Positive bowel sounds. No rebound, guarding or organomegaly. Neurologic: No focal neurological deficits.  CNII-XII gr wounds - appear mostly superficial, denies any infectious s/s  1. Discontinue augmentin given adverse GI side effects  2. Topical mupirocin  3. Wound care  4. DM2  1. Hyperglycemia protocol, correction factor  2.  Hold glimepiride, metformin, liraglutide  5

## 2021-04-07 NOTE — OCCUPATIONAL THERAPY NOTE
OT order received, chart reviewed, pt with pending ortho consult and MRI of spine, OT will follow up after complete and POC in chart.

## 2021-04-07 NOTE — PROGRESS NOTES
Pt is AOx4. VSS, on RA, O2 sats >90%. Tele, NSR. Pt has complaints of moderate-severe pain, given prn pain meds. 1x BM so far this shift after giving magnesium citrate and miralax due to constipation. MRI performed this am. QID.  Low fiber soft diet, adv as

## 2021-04-07 NOTE — CONSULTS
[facility]  Report of Inpatient Wound Care Consultation    Yehuda Delaneye.  Patient Status:  Observation    1960 MRN [id number]   [facility] 5NW-A Attending Anastasiya Qiu, [address] Day # 0 PCP MD Deja Kraus 10/19/2018    Performed by Cuong Jimenez MD at [facility]   • OTHER      Right big toe partially amputated   • [facility]   • TIBIAL BONE GRAFTING Left 10/19/2018    Performed by Cuong Jimenez MD at [place] [address] with saline . Apply mupirocin ointment 3x daily as per podiatry. Cover with gauze. Patient to continue to follow up with  if discharge. Thank you for allowing me to participate in the care of your patient. Time Spent 30 minutes, Thank you.

## 2021-04-07 NOTE — PROGRESS NOTES
NURSING ADMISSION NOTE      Patient admitted via Cart  Oriented to room. Safety precautions initiated. Bed in low position. Call light in reach. PTA med list done.  Pt fell to his knees when assisted from cart at doorway of room when walked to bed

## 2021-04-08 ENCOUNTER — TELEPHONE (OUTPATIENT)
Dept: PAIN CLINIC | Facility: CLINIC | Age: 61
End: 2021-04-08

## 2021-04-08 VITALS
SYSTOLIC BLOOD PRESSURE: 145 MMHG | RESPIRATION RATE: 24 BRPM | BODY MASS INDEX: 33.09 KG/M2 | HEART RATE: 85 BPM | TEMPERATURE: 98 F | DIASTOLIC BLOOD PRESSURE: 82 MMHG | WEIGHT: 271.69 LBS | HEIGHT: 76 IN | OXYGEN SATURATION: 99 %

## 2021-04-08 DIAGNOSIS — M54.16 LUMBAR RADICULITIS: Primary | ICD-10-CM

## 2021-04-08 PROCEDURE — 99217 OBSERVATION CARE DISCHARGE: CPT | Performed by: HOSPITALIST

## 2021-04-08 PROCEDURE — 99204 OFFICE O/P NEW MOD 45 MIN: CPT | Performed by: ANESTHESIOLOGY

## 2021-04-08 RX ORDER — METHYLPREDNISOLONE 4 MG/1
TABLET ORAL
Qty: 21 TABLET | Refills: 0 | Status: SHIPPED | OUTPATIENT
Start: 2021-04-08 | End: 2021-04-20 | Stop reason: ALTCHOICE

## 2021-04-08 RX ORDER — TRAMADOL HYDROCHLORIDE 50 MG/1
50 TABLET ORAL EVERY 6 HOURS PRN
Qty: 30 TABLET | Refills: 0 | Status: ON HOLD | OUTPATIENT
Start: 2021-04-08 | End: 2021-06-07

## 2021-04-08 RX ORDER — CYCLOBENZAPRINE HCL 5 MG
5 TABLET ORAL 3 TIMES DAILY PRN
Qty: 30 TABLET | Refills: 0 | Status: SHIPPED | OUTPATIENT
Start: 2021-04-08 | End: 2021-04-21

## 2021-04-08 NOTE — TELEPHONE ENCOUNTER
Patient has [address]. Prior Auth needed prior to scheduling injection . No Medical clearance required. Dr Kelly Gómez to place referral for Epidural steroid Injection?          JAYE Us RN; Israel Lopez RN; Nickie Hinton

## 2021-04-08 NOTE — HOME CARE LIAISON
Patient provided with list of Ryan Magana providers from [facility], patient choice is [facility].   Agency reserved in [facility] and contact information placed on AVS.   Financial interest disclosure provided to patient.

## 2021-04-08 NOTE — OCCUPATIONAL THERAPY NOTE
OCCUPATIONAL THERAPY QUICK EVALUATION - INPATIENT    Room Number: 526/526-A  Evaluation Date: 4/8/2021     Type of Evaluation: Initial  Presenting Problem: Flank Pain    Physician Order: IP Consult to Occupational Therapy  Reason for Therapy:  ADL/IADL Dys Unspecified essential hypertension    • Visual impairment    • Wears glasses 1-1-1990    Readers   • Weight gain April 2020    Lock down       Past Surgical History  Past Surgical History:   Procedure Laterality Date   • ACHILLES TENDON REPAIR/LENGTHENING Upper extremity strength is within functional limits     NEUROLOGICAL FINDINGS                   ACTIVITY TOLERANCE                         O2 SATURATIONS       ACTIVITIES OF DAILY LIVING ASSESSMENT  AM-PAC ‘6-Clicks’ Inpatient Daily Activity Short For further questions or concerns about safe return to I/ADL tasks. No further OT services needed at this time.       Patient Complexity  Occupational Profile/Medical History  LOW - Brief history including review of medical or therapy records    Specific perfor

## 2021-04-08 NOTE — TELEPHONE ENCOUNTER
----- Message from Mona Dixon RN sent at 4/8/2021  1:34 PM CDT -----  Regarding: VITO scheduling  Dr. Campbell Diego would like to schedule this patient for an outpatient VITO next week if insurance approves.  He will write in his note the levels he would like t

## 2021-04-08 NOTE — PHYSICAL THERAPY NOTE
PHYSICAL THERAPY EVALUATION - INPATIENT     Room Number: 526/526-A  Evaluation Date: 4/8/2021  Type of Evaluation: Initial  Physician Order: PT Eval and Treat    Presenting Problem: intractable low back pain  Reason for Therapy: Mobility Dysfunction Unspecified essential hypertension    • Visual impairment    • Wears glasses 1-1-1990    Readers   • Weight gain April 2020    Lock down       Past Surgical History  Past Surgical History:   Procedure Laterality Date   • ACHILLES TENDON REPAIR/LENGTHENING MOTION AND STRENGTH ASSESSMENT  Upper extremity ROM and strength are within functional limits     Lower extremity ROM is within functional limits     Lower extremity strength is within functional limits except for the following:    Left Hip flexion  3+/5 presenting with paresthesias in L5 dermatome. Pt returned to supine independently. Updated pt on role of PT, POC, DC planning/recs, positioning.,activity.        Exercise/Education Provided:  Bed mobility  Functional activity tolerated  Gait training  Postu restrictive device at assistance level: modified independent     Goal #4 Assess stairs as appropriate. Goal #5    Goal #6    Goal Comments: Goals established on 4/8/2021  PPE donned for session: gloves, surgical mask, eyewear. Pt was masked during sessio

## 2021-04-08 NOTE — DISCHARGE SUMMARY
[facility] HOSPITAL[facility]  DISCHARGE SUMMARY     Gerhardt Fleet.  Patient Status:  Observation    1960 MRN [id number]   [facility] 5NW-A Attending Renee Biswas, [address] [facility] Day # 0 PCP Efra Estrada MD     Date of Admission: 2021 radiating to left leg with some numbness and weakness. MRI lumbar spine revealed bulging disc at L4-L5 with nerve root impingement. Patient treated with steroids and muscle relaxants with improvement.  Patient to follow-up with pain service for possible VITO losartan 100 MG Tabs  Commonly known as: COZAAR      TAKE 1 TABLET BY MOUTH DAILY.  GENERIC EQUIVALENT FOR COZAAR   Quantity: 90 tablet  Refills: 0     metFORMIN HCl 1000 MG Tabs  Commonly known as: GLUCOPHAGE      TAKE 1 TABLET BY MOUTH TWICE DAILY WITH (37.3 °C)] 97.9 °F (36.6 °C)  Pulse:  [72-85] 85  Resp:  [12-25] 24  BP: (114-155)/(62-85) 145/82    Physical Exam:    General: No acute distress. Respiratory: Clear to auscultation bilaterally. No wheezes. No rhonchi.   Cardiovascular: S1, S2. Regular ra

## 2021-04-08 NOTE — DISCHARGE PLANNING
NURSING DISCHARGE NOTE    Discharged Home via Wheelchair. Accompanied by Spouse and Support staff  Belongings Taken by patient/family. PIV removed. Tele box & battery removed & placed in drawer. Discharge navigator complete.    Discharge instruction

## 2021-04-08 NOTE — PLAN OF CARE
Problem: Diabetes/Glucose Control  Goal: Glucose maintained within prescribed range  Description: INTERVENTIONS:  - Monitor Blood Glucose as ordered  - Assess for signs and symptoms of hyperglycemia and hypoglycemia  - Administer ordered medications to m respiratory effort  - Oxygen supplementation based on oxygen saturation or ABGs  - Provide Smoking Cessation handout, if applicable  - Encourage broncho-pulmonary hygiene including cough, deep breathe, Incentive Spirometry  - Assess the need for suctioning (bariatric)  Description: INTERVENTIONS:  - Monitor for over-consumption  - Identify factors contributing to increased intake, treat as appropriate  - Monitor I&O, WT and lab values  - Obtain nutritional consult as needed  - Evaluate psychosocial factors c Initiate isolation precautions as appropriate  - Initiate Pressure Ulcer prevention bundle as indicated  Outcome: Progressing  Goal: Oral mucous membranes remain intact  Description: INTERVENTIONS  - Assess oral mucosa and hygiene practices  - Implement pr tolerated functional activity level and precautions during self-care    Outcome: Progressing     Pt a&ox4. Glasses. Room air, o2 >90%. Hx ilan, cpap at home. Tele, NSR. Afebrile. Lovenox. Voids, up SBA w/ walker. Saline locked. No complaints of sob, n/v.  Co

## 2021-04-08 NOTE — CM/SW NOTE
Met with patient to discuss [facility] recommendations. He is in agreement. [facility] liaison will meet with patient.       Alfredo, [address]

## 2021-04-09 ENCOUNTER — TELEPHONE (OUTPATIENT)
Dept: FAMILY MEDICINE CLINIC | Facility: CLINIC | Age: 61
End: 2021-04-09

## 2021-04-09 ENCOUNTER — PATIENT OUTREACH (OUTPATIENT)
Dept: CASE MANAGEMENT | Age: 61
End: 2021-04-09

## 2021-04-09 DIAGNOSIS — Z02.9 ENCOUNTERS FOR UNSPECIFIED ADMINISTRATIVE PURPOSE: ICD-10-CM

## 2021-04-09 DIAGNOSIS — M54.16 LUMBAR RADICULOPATHY: ICD-10-CM

## 2021-04-09 PROCEDURE — 1111F DSCHRG MED/CURRENT MED MERGE: CPT

## 2021-04-09 NOTE — TELEPHONE ENCOUNTER
Call from PT [id number] [facility] PT  Pt did not advise back re plan visit today  Will resched on Mon Fyi

## 2021-04-09 NOTE — PROGRESS NOTES
Initial Post Discharge Follow Up   Discharge Date: 4/8/21  Contact Date: 4/9/2021    Consent Verification:  Assessment Completed With: Patient  HIPAA Verified? Yes    Discharge Dx:  1. Lumbar radiculopathy  2. Constipation   3.  Chronic right foot wound Reviewed medication list.  Medications are up to date. Current Outpatient Medications   Medication Sig Dispense Refill   • cyclobenzaprine 5 MG Oral Tab Take 1 tablet (5 mg total) by mouth 3 (three) times daily as needed for Muscle spasms.  30 tablet 0 keep you from taking your medication as prescribed? No  Are you having any concerns with constipation? No    Referrals/orders at D/C:  Home Health/Services ordered at D/C?   Yes   What services:   Rehab, CHF, Stroke, PT, OT, Speech, Other: Home Health-RN  ( office re: assistance in scheduling HFU appt. Patient denies any questions or concerns at this time.       CCM referral placed:  Not Applicable    [x]  Discharge Summary, Discharge medications reviewed/discussed/and reconciled against outpatient medications

## 2021-04-09 NOTE — TELEPHONE ENCOUNTER
Pt discharged 4-8-21, flank pain. Pt does not have HFU appt scheduled at this time. [facility] offered to schedule however pt said next week is busy with him and his wife both needing to drive a ways to get their 2nd dose of the covid vaccine.  He said he would

## 2021-04-10 NOTE — CONSULTS
Name: Morelia Jiang.    : 1960   DOS: 2021     Chief complaint: Low back pain    History of present illness:  Morelia Jiang. is a 56year old male patient with acute severe low back pain was admitted in the hospital.  Pain is in t tablet 0   • methylPREDNISolone 4 MG Oral Tablet Therapy Pack Take as directed on dose pack instructions 21 tablet 0   • glimepiride 2 MG Oral Tab TAKE 2 TABLETS BY MOUTH DAILY WITH BREAKFAST AND 1 TABLET WITH DINNER GENERIC EQUIVALENT FOR AMARYL 270 table Chitra Luna MD at [facility]  18 years ago      Family History   Problem Relation Age of Onset   • Cancer Father         colon   • Colon Cancer Father         Cause of death   • Other (Other) Mother         alcoholism   • 56year old male not in acute distress  /82   Pulse 85   Temp 97.9 °F (36.6 °C) (Oral)   Resp 24   Ht 76\"   Wt 271 lb 11.2 oz (123.2 kg)   SpO2 99%   BMI 33.07 kg/m²    The patient is awake, alert, oriented and corporative. He has a normal affect.  Yumiko Read negative on the right side. Straight leg raising test is positive on the left side at 15 degrees, negative on the right side. Gowers’ sign is the negative. Dorsally pedis is palpable bilaterally.  Heel walking and toe walking was not possible  Motor Examin the epidural steroid injection does not help, he will come back for facet and sacroiliac joint injection. Thank you very much for referring the patient to me. Doll Denver, M.D.   Pain Management

## 2021-04-12 ENCOUNTER — TELEPHONE (OUTPATIENT)
Dept: FAMILY MEDICINE CLINIC | Facility: CLINIC | Age: 61
End: 2021-04-12

## 2021-04-12 DIAGNOSIS — E11.59 HYPERTENSION ASSOCIATED WITH DIABETES (HCC): Primary | ICD-10-CM

## 2021-04-12 DIAGNOSIS — I15.2 HYPERTENSION ASSOCIATED WITH DIABETES (HCC): Primary | ICD-10-CM

## 2021-04-12 RX ORDER — HYDROCHLOROTHIAZIDE 12.5 MG/1
12.5 TABLET ORAL DAILY
Qty: 30 TABLET | Refills: 1 | OUTPATIENT
Start: 2021-04-12 | End: 2021-05-27

## 2021-04-12 RX ORDER — HYDROCHLOROTHIAZIDE 12.5 MG/1
12.5 TABLET ORAL DAILY
Qty: 30 TABLET | Refills: 1 | Status: SHIPPED
Start: 2021-04-12 | End: 2021-04-12

## 2021-04-12 NOTE — TELEPHONE ENCOUNTER
Jordana Rowan of [facility] PT-calling to report 1. He will be doing home PT for the low back pain and leg pain 2 times this week and again next week. Patient is in a lot of pain. His BP is 164/88 and pulse rate 92.  He's on Losartan 100 mg daily and

## 2021-04-12 NOTE — TELEPHONE ENCOUNTER
Created [facility] Referral for Left L4-S1 TLESI ([id number]) x1   Uploaded Clinicals   Referral #:[id number]  Case Pending

## 2021-04-12 NOTE — TELEPHONE ENCOUNTER
Spoke to Isra Lawson. with [facility]. He is aware of starting the hydrochlorothiazide 12.5 mg daily. Rx sent to his pharmacy. He also asked that I fax the order to [phone].

## 2021-04-12 NOTE — TELEPHONE ENCOUNTER
Ivan Fay MD  You 2 days ago     I have already dictated that note    Message text        Patient has [employer] . Need to complete PA prior to scheduling. Referral needed to initiate Prior Auth.      Dr Markus Dodge- Please kindly place referral for

## 2021-04-13 ENCOUNTER — TELEPHONE (OUTPATIENT)
Dept: FAMILY MEDICINE CLINIC | Facility: CLINIC | Age: 61
End: 2021-04-13

## 2021-04-13 NOTE — TELEPHONE ENCOUNTER
Pt provided with Tramadol 50 mg tabs, qty 30 tabs to be taken Q6H, cyclobenzaprine 5 mg TID, qty 30 tabs, and a Medrol dose pack on 4/8/2021 upon hospital DC.

## 2021-04-13 NOTE — TELEPHONE ENCOUNTER
Pt was in the hospital for back pain. He was given a steroid. He is supposed to have his second covid vaccine Thursday. He is done with medication tomorrow. They are wondering if it's ok to get he vaccine. He is also on two different pain killers.  Please a

## 2021-04-13 NOTE — TELEPHONE ENCOUNTER
Pt's wife states pt is in extreme pain and can't wait for ins to approve inj. Pt and wife both aware case is pending as of yesterday. Pt's wife would like to know what pt can do in the meantime for pain. Pls advise.

## 2021-04-14 RX ORDER — TRAMADOL HYDROCHLORIDE 50 MG/1
50 TABLET ORAL EVERY 8 HOURS PRN
Qty: 90 TABLET | Refills: 0 | Status: SHIPPED | OUTPATIENT
Start: 2021-04-14 | End: 2021-04-20

## 2021-04-14 NOTE — TELEPHONE ENCOUNTER
Neel Frey MD  You 1 hour ago (1:12 PM)     CHRISTOPHER, I have sent a prescription of tramadol for him. Message text      Discussed with CARON Barnes that pt had been taking tramadol 50 mg Q6H and the script sent today was for tramadol 50 mg Q8H.  Amber

## 2021-04-15 NOTE — TELEPHONE ENCOUNTER
Prior authorization request completed for: Left L4-S1 TLESI   Authorization #[id number]    Authorization dates: 04/12/21 - 07/14/21  CPT codes approved: 46179,45893  Number of visits/dates of service approved: 1  Physician: Kenneth Otoole   [facility]: [facility]

## 2021-04-16 NOTE — TELEPHONE ENCOUNTER
Question Answer Comment   Anesthesia Type Sedation    Provider [facility]    Location Lab    Procedure Transforaminal    Laterality/Level left transforaminal lumbar epidural steroid injection at left L4-L5 and L5-S1 level    Implants No    Medical clearance requ

## 2021-04-19 ENCOUNTER — TELEPHONE (OUTPATIENT)
Dept: FAMILY MEDICINE CLINIC | Facility: CLINIC | Age: 61
End: 2021-04-19

## 2021-04-19 NOTE — TELEPHONE ENCOUNTER
Call from lindsey/PT/[facility]-\"updating dr Rosario Box re plan to discharge pt from [facility] PT 4/23/21-no longer homebound. Still having pain, has been advised to follow up w neurosurgery. Plan will be either surgery or transition to outpt PT. \"  sts does not need ca

## 2021-04-19 NOTE — TELEPHONE ENCOUNTER
Pt returning call from last week. Advised pt on insurance auth and ability to proceed.  Pt stated he didn't know if this makes a difference or not but was in such terrible pain he called a doctor friend of his and he had Dr. Scott Santiago give him a steroid in

## 2021-04-19 NOTE — TELEPHONE ENCOUNTER
Gilberto Mckeon MD  You 38 minutes ago (1:01 PM)     TAMIKO GREEN, please tell the patient to proceed with the injection with Dr. Kenia Chase not with me. Yeny Luque is better to do the injection with same person.     Message text           Jason De Santiago MD 3 hour

## 2021-04-20 ENCOUNTER — MED REC SCAN ONLY (OUTPATIENT)
Dept: FAMILY MEDICINE CLINIC | Facility: CLINIC | Age: 61
End: 2021-04-20

## 2021-04-20 ENCOUNTER — OFFICE VISIT (OUTPATIENT)
Dept: FAMILY MEDICINE CLINIC | Facility: CLINIC | Age: 61
End: 2021-04-20

## 2021-04-20 VITALS
HEART RATE: 104 BPM | WEIGHT: 254 LBS | SYSTOLIC BLOOD PRESSURE: 104 MMHG | RESPIRATION RATE: 16 BRPM | TEMPERATURE: 98 F | BODY MASS INDEX: 31 KG/M2 | DIASTOLIC BLOOD PRESSURE: 68 MMHG

## 2021-04-20 DIAGNOSIS — M54.16 LUMBAR RADICULOPATHY: Primary | ICD-10-CM

## 2021-04-20 PROCEDURE — 3078F DIAST BP <80 MM HG: CPT | Performed by: FAMILY MEDICINE

## 2021-04-20 PROCEDURE — 99495 TRANSJ CARE MGMT MOD F2F 14D: CPT | Performed by: FAMILY MEDICINE

## 2021-04-20 PROCEDURE — 3074F SYST BP LT 130 MM HG: CPT | Performed by: FAMILY MEDICINE

## 2021-04-20 RX ORDER — HYDROCODONE BITARTRATE AND ACETAMINOPHEN 10; 325 MG/1; MG/1
1 TABLET ORAL EVERY 6 HOURS
COMMUNITY
Start: 2021-04-18 | End: 2021-05-27

## 2021-04-20 NOTE — PROGRESS NOTES
Pain  HPI:    Rob Martin. is a 56year old male here today for hospital follow up.    He was discharged from Inpatient hospital, [facility] to Home   Admission Date: 4/6/21   Discharge Date: 4/8/21  Hospital Discharge Diagnoses (since 3/21/20 to follow-up with spine surgery for continued monitoring. Patient improved with above management and discharged home in good condition. Allergies:  He has No Known Allergies.     Current Meds:  cyclobenzaprine 5 MG Oral Tab, Take 1 tablet (5 mg total) b Weight gain (April 2020). He  has a past surgical history that includes colonoscopy; other; appendectomy; vasectomy (18 years ago); colonoscopy (2015); and sigmoidoscopy,diagnostic (1989).     He family history includes Alcohol and Other Disorders Associ this visit:    Lumbar radiculopathy    Patient will call back with his preference for a neurosurgeon    No orders of the defined types were placed in this encounter.       Meds & Refills for this Visit:  Requested Prescriptions     Pending Prescriptions Dis

## 2021-04-21 ENCOUNTER — PATIENT MESSAGE (OUTPATIENT)
Dept: FAMILY MEDICINE CLINIC | Facility: CLINIC | Age: 61
End: 2021-04-21

## 2021-04-21 RX ORDER — CYCLOBENZAPRINE HCL 10 MG
10 TABLET ORAL 3 TIMES DAILY PRN
Qty: 15 TABLET | Refills: 1 | Status: SHIPPED | OUTPATIENT
Start: 2021-04-21 | End: 2021-05-27

## 2021-04-21 RX ORDER — PREDNISONE 10 MG/1
TABLET ORAL
Qty: 22 TABLET | Refills: 0 | Status: SHIPPED | OUTPATIENT
Start: 2021-04-21 | End: 2021-05-03 | Stop reason: ALTCHOICE

## 2021-04-21 NOTE — TELEPHONE ENCOUNTER
Prescription for prednisone and cyclobenzaprine sent. Please double check with the patient that he has enough pain medication.

## 2021-05-03 ENCOUNTER — TELEPHONE (OUTPATIENT)
Dept: PAIN CLINIC | Facility: CLINIC | Age: 61
End: 2021-05-03

## 2021-05-03 ENCOUNTER — OFFICE VISIT (OUTPATIENT)
Dept: SURGERY | Facility: CLINIC | Age: 61
End: 2021-05-03

## 2021-05-03 VITALS
SYSTOLIC BLOOD PRESSURE: 122 MMHG | WEIGHT: 256 LBS | DIASTOLIC BLOOD PRESSURE: 72 MMHG | HEIGHT: 76 IN | BODY MASS INDEX: 31.17 KG/M2 | HEART RATE: 72 BPM

## 2021-05-03 DIAGNOSIS — M54.16 LUMBAR RADICULOPATHY, ACUTE: Primary | ICD-10-CM

## 2021-05-03 PROCEDURE — 3008F BODY MASS INDEX DOCD: CPT | Performed by: NEUROLOGICAL SURGERY

## 2021-05-03 PROCEDURE — 99203 OFFICE O/P NEW LOW 30 MIN: CPT | Performed by: NEUROLOGICAL SURGERY

## 2021-05-03 PROCEDURE — 3074F SYST BP LT 130 MM HG: CPT | Performed by: NEUROLOGICAL SURGERY

## 2021-05-03 PROCEDURE — 3078F DIAST BP <80 MM HG: CPT | Performed by: NEUROLOGICAL SURGERY

## 2021-05-03 RX ORDER — GABAPENTIN 100 MG/1
100 CAPSULE ORAL 3 TIMES DAILY
Qty: 180 CAPSULE | Refills: 1 | Status: SHIPPED | OUTPATIENT
Start: 2021-05-03 | End: 2021-05-27

## 2021-05-03 NOTE — PROGRESS NOTES
Patient here for consult, referred by Dr. Enoch Red for lumbar radiculopathy. Onset of low back pain on 04/06, no known cause. Patient woke up with low back pain, felt like he was shot in hip. Admitted to hospital for 2 days.     Had epidural shortly aft

## 2021-05-03 NOTE — TELEPHONE ENCOUNTER
See new message received from Dr Sheeba Hussein in [facility] 5/3/2021    Marcelino Anglin MD  [facility]  Spoke with dr Plaza Spearing   He will do next injection   Rec left l4/5 and l5/s1 tfesi   Please call and set up        Per below recommendation per Dr Vickey Salgado

## 2021-05-03 NOTE — H&P
Neurosurgery Clinic Visit  5/3/2021    Ella Logan.  PCP:  Evonne Novak MD    1960 MRN [id number]       CHIEF COMPLAINT:  Patient presents with:  Consult: Refd by PCP  Lumbar radiculopathy      HISTORY OF PRESENT ILLNESS:  Jamarcus Medina 1 tablet (50 mg total) by mouth every 6 (six) hours as needed for Pain.  30 tablet 0   • mupirocin 2 % External Ointment Apply a small amount to the toe and foot wounds daily after cleansing and cover with a Band-Aid 1 Tube 2   • glimepiride 2 MG Oral Tab T Father         Cause of death   • Other (Other) Mother         alcoholism   • Alcohol and Other Disorders Associated Mother         Contributed to her death in [address]  reports that he quit smoking about 19 months ago.  His smoking use included ciga Flexion Hip Adduction Hip Abduction Knee Flexion Knee Extension Plantar- flexion Dorsi- flexion EHL   Left 5/5 5/5 5/5 5/5 5/5 5/5 5/5 5/5   Right 5/5 5/5 5/5 5/5 5/5 5/5 5/5 5/5     DTRs:   Biceps Triceps Brachio Patella Ankle   Left 2+ 2+ 2+ 2+ 2+   Righ

## 2021-05-03 NOTE — TELEPHONE ENCOUNTER
Jocelin Johnson MD  [facility]  Spoke with dr Gustabo Pantoja   He will do next injection   Rec left l4/5 and l5/s1 tfesi   Please call and set up       Patient has [facility]. Prior authorization required.      Dr Jose L Pendleton to place referrals for injections d

## 2021-05-04 ENCOUNTER — MED REC SCAN ONLY (OUTPATIENT)
Dept: FAMILY MEDICINE CLINIC | Facility: CLINIC | Age: 61
End: 2021-05-04

## 2021-05-05 ENCOUNTER — PATIENT MESSAGE (OUTPATIENT)
Dept: SURGERY | Facility: CLINIC | Age: 61
End: 2021-05-05

## 2021-05-05 ENCOUNTER — TELEPHONE (OUTPATIENT)
Dept: SURGERY | Facility: CLINIC | Age: 61
End: 2021-05-05

## 2021-05-05 NOTE — TELEPHONE ENCOUNTER
Received signed and completed Persons with Disabilities Certification for Parking Placard/License Plates Form from Dr. Nanci Meyer. No release form signed and no fax identified on forms     Called pt, no answer.  Left message on personalized VM informing pt that forms are completed and can be picked up at the front office     Copy made     Endorsed to  staff with printed scanning sheet

## 2021-05-05 NOTE — TELEPHONE ENCOUNTER
Yas White MD  You 30 minutes ago (12:01 PM)     Dr. Tiago Gonzáles called me for this patient. [facility] schedule him with me.     Message text

## 2021-05-06 NOTE — TELEPHONE ENCOUNTER
S:  Patient message noted.      B:   Per LOV notes from 5/3/21 with Dr. Heath Crew:    Lianet Sri Lankan:  41-year-old gentleman with left lumbar radiculopathy  He did get 1 injection which helped him quite a bit  I spoke with Dr. Jose L Pendleton he will get set up

## 2021-05-06 NOTE — TELEPHONE ENCOUNTER
Spoke with pt to advise after Dr. Irma Valero spoke with Dr. Sasha Sutton he has decided he would do the inj for the patient. Patient scheduled for procedure, pre-procedure instructions reviewed. Patient prefers local sedation.  Reviewed sedation instructions includin following medications:  • Aggrenox 10 days   • Agrylin (Anagrelide) 10 days  • Brilinta (Ticagrelor) 7 days  • Imbruvica (Ibrutinib) 3 days   • Enbrel (Etanercept) 24 hours   • Fragmin (Dalteparin) 24 hours   • Pletal (Cilostazol) 7 days  • Effient (Prasug [phone]. If you are a diabetic, please increase the frequency of your glucose monitoring after the procedure as this may cause a temporary increase in your blood sugar.   Contact your primary care physician if your blood sugar rises as you may require

## 2021-05-06 NOTE — TELEPHONE ENCOUNTER
Pt is calling to speak with Gail Joyner to schedule a steroid epidural. Gail Joyner not available to transfer call. Please return pt's call on his cell at [phone].

## 2021-05-06 NOTE — TELEPHONE ENCOUNTER
From: Romie Shanks. To: Luisa Chang MD  Sent: 5/5/2021 4:37 PM CDT  Subject: Visit Follow-up Question    Dr. Josiane Bernal, I have not heard back from Dr. Elvira Brown staff about scheduling the steroid epidural. Should I call his office directly?  Pl

## 2021-05-17 ENCOUNTER — APPOINTMENT (OUTPATIENT)
Dept: GENERAL RADIOLOGY | Facility: HOSPITAL | Age: 61
End: 2021-05-17
Attending: ANESTHESIOLOGY
Payer: COMMERCIAL

## 2021-05-17 ENCOUNTER — HOSPITAL ENCOUNTER (OUTPATIENT)
Facility: HOSPITAL | Age: 61
Setting detail: HOSPITAL OUTPATIENT SURGERY
Discharge: HOME | End: 2021-05-17
Attending: ANESTHESIOLOGY | Admitting: ANESTHESIOLOGY
Payer: COMMERCIAL

## 2021-05-17 VITALS
DIASTOLIC BLOOD PRESSURE: 70 MMHG | OXYGEN SATURATION: 99 % | TEMPERATURE: 98 F | SYSTOLIC BLOOD PRESSURE: 143 MMHG | RESPIRATION RATE: 18 BRPM | HEART RATE: 82 BPM

## 2021-05-17 DIAGNOSIS — M54.16 LUMBAR RADICULITIS: ICD-10-CM

## 2021-05-17 PROCEDURE — 82962 GLUCOSE BLOOD TEST: CPT

## 2021-05-17 PROCEDURE — 3E0R33Z INTRODUCTION OF ANTI-INFLAMMATORY INTO SPINAL CANAL, PERCUTANEOUS APPROACH: ICD-10-PCS | Performed by: ANESTHESIOLOGY

## 2021-05-17 PROCEDURE — 3E0R3BZ INTRODUCTION OF ANESTHETIC AGENT INTO SPINAL CANAL, PERCUTANEOUS APPROACH: ICD-10-PCS | Performed by: ANESTHESIOLOGY

## 2021-05-17 RX ORDER — SODIUM CHLORIDE, SODIUM LACTATE, POTASSIUM CHLORIDE, CALCIUM CHLORIDE 600; 310; 30; 20 MG/100ML; MG/100ML; MG/100ML; MG/100ML
100 INJECTION, SOLUTION INTRAVENOUS CONTINUOUS
Status: DISCONTINUED | OUTPATIENT
Start: 2021-05-17 | End: 2021-05-17

## 2021-05-17 RX ORDER — ONDANSETRON 2 MG/ML
4 INJECTION INTRAMUSCULAR; INTRAVENOUS ONCE AS NEEDED
Status: CANCELLED | OUTPATIENT
Start: 2021-05-17 | End: 2021-05-17

## 2021-05-17 RX ORDER — ONDANSETRON 2 MG/ML
4 INJECTION INTRAMUSCULAR; INTRAVENOUS ONCE AS NEEDED
Status: DISCONTINUED | OUTPATIENT
Start: 2021-05-17 | End: 2021-05-17

## 2021-05-17 RX ORDER — DIPHENHYDRAMINE HYDROCHLORIDE 50 MG/ML
50 INJECTION INTRAMUSCULAR; INTRAVENOUS ONCE AS NEEDED
Status: CANCELLED | OUTPATIENT
Start: 2021-05-17 | End: 2021-05-17

## 2021-05-17 RX ORDER — INSULIN ASPART 100 [IU]/ML
3 INJECTION, SOLUTION INTRAVENOUS; SUBCUTANEOUS ONCE
Status: DISCONTINUED | OUTPATIENT
Start: 2021-05-17 | End: 2021-05-17

## 2021-05-17 RX ORDER — DEXTROSE MONOHYDRATE 25 G/50ML
50 INJECTION, SOLUTION INTRAVENOUS
Status: DISCONTINUED | OUTPATIENT
Start: 2021-05-17 | End: 2021-05-17

## 2021-05-17 RX ORDER — DEXAMETHASONE SODIUM PHOSPHATE 10 MG/ML
INJECTION, SOLUTION INTRAMUSCULAR; INTRAVENOUS
Status: DISCONTINUED | OUTPATIENT
Start: 2021-05-17 | End: 2021-05-17

## 2021-05-17 RX ORDER — LIDOCAINE HYDROCHLORIDE 10 MG/ML
INJECTION, SOLUTION EPIDURAL; INFILTRATION; INTRACAUDAL; PERINEURAL
Status: DISCONTINUED | OUTPATIENT
Start: 2021-05-17 | End: 2021-05-17

## 2021-05-17 NOTE — H&P
History & Physical Examination    Patient Name: Luisito Adjutant.   MRN: [id number]  CSN: [id number]  YOB: 1960    Pre-Operative Diagnosis:  Lumbar radiculitis [M54.16]    Present Illness: 69-year-old male patient with chronic low kofi 1.6      Smokeless tobacco: Never Used      Tobacco comment: 11/2019    Alcohol use:  Yes      Alcohol/week: 1.0 - 2.0 standard drinks      Types: 1 Glasses of wine per week      Comment: Occasionally, socially      SYSTEM Check if Review is Normal Check if

## 2021-05-18 NOTE — OPERATIVE REPORT
[facility]  Operative Report  2021     Jo Riley Jr.  Patient Status:  Hospital Outpatient Surgery    1960 MRN [id number]   Location [id number] [place] [address] Attending No att. providers found   [facility] Day # 0 CARINA Aguirre anterior epidural space at this level. The needle position was confirmed under AP and lateral fluoroscopic view. Following negative aspiration for CSF and blood, approximately 1 mL of Omnipaque 240 was injected.   An excellent contrast spread along the ep

## 2021-05-27 ENCOUNTER — TELEPHONE (OUTPATIENT)
Dept: SURGERY | Facility: CLINIC | Age: 61
End: 2021-05-27

## 2021-05-27 ENCOUNTER — OFFICE VISIT (OUTPATIENT)
Dept: SURGERY | Facility: CLINIC | Age: 61
End: 2021-05-27

## 2021-05-27 ENCOUNTER — TELEPHONE (OUTPATIENT)
Dept: FAMILY MEDICINE CLINIC | Facility: CLINIC | Age: 61
End: 2021-05-27

## 2021-05-27 VITALS — HEART RATE: 88 BPM | DIASTOLIC BLOOD PRESSURE: 60 MMHG | SYSTOLIC BLOOD PRESSURE: 135 MMHG

## 2021-05-27 DIAGNOSIS — M54.16 LUMBAR RADICULOPATHY, ACUTE: Primary | ICD-10-CM

## 2021-05-27 DIAGNOSIS — M54.16 LUMBAR RADICULOPATHY: Primary | ICD-10-CM

## 2021-05-27 PROCEDURE — 3075F SYST BP GE 130 - 139MM HG: CPT | Performed by: PHYSICIAN ASSISTANT

## 2021-05-27 PROCEDURE — 99213 OFFICE O/P EST LOW 20 MIN: CPT | Performed by: PHYSICIAN ASSISTANT

## 2021-05-27 PROCEDURE — 3078F DIAST BP <80 MM HG: CPT | Performed by: PHYSICIAN ASSISTANT

## 2021-05-27 RX ORDER — GABAPENTIN 300 MG/1
300 CAPSULE ORAL 3 TIMES DAILY
Qty: 90 CAPSULE | Refills: 2 | Status: SHIPPED | OUTPATIENT
Start: 2021-05-27 | End: 2021-06-04

## 2021-05-27 NOTE — TELEPHONE ENCOUNTER
You are scheduled for LEFT LUMBAR 4 - LUMBAR 5 FRAGMENTECTOMY, POSSIBLE DISCECTOMY AND ALL INDICATED PROCEDURES. on 6-7-21 with Dr. Prateek Vega    · PCP clearance is needed. We have faxed a request for pre-op clearance to you PCP Deya Goyal.  Please contact th Surgery is usually scheduled as 1 day admission. · The hospital will contact you 1-2 days before surgery with your arrival time.      · If you were on blood thinners (such as Coumadin, Pradaxa, Xarelto, etc) prior to surgery that we had you stop for surg First Aid supplies  · Shower with this daily for FIVE consecutive days before surgery, using the entire bottle over the five days. The last shower should be the night before surgery.    Steps to bathing with Hibiclens  · Do not use Hibiclens on your hair,

## 2021-05-27 NOTE — H&P (VIEW-ONLY)
Neurosurgery Clinic Visit  2021    Elijah Chou.  PCP:  Yokasta Lopez MD    1960 MRN [id number]     HISTORY OF PRESENT ILLNESS:  Chase Mckenzie. is a(n) 56year old male who is here for follow-up for lumbar radicu [id number]  [phone]  5/27/2021  3:26 PM

## 2021-05-27 NOTE — TELEPHONE ENCOUNTER
Pt needs h/p for surgery 6/7, ok per Dr Frandy Toribio to use SDA or pt can see annmarie. See letter section for pre op details. Per letter, pre admission will order pre op labs. LM for pt to cb to schedule. Fax at [facility].

## 2021-05-27 NOTE — TELEPHONE ENCOUNTER
Patient is scheduled for  LEFT LUMBAR 4 - LUMBAR 5 FRAGMENTECTOMY, POSSIBLE DISCECTOMY A on 6-7-21 with Dr Berline Meigs.     X  form completed    X Surgery order signed     xPlaced sx on surgery sheet    xPlaced on outlook calendar    X Dibsiehart message

## 2021-05-27 NOTE — H&P
Neurosurgery Clinic Visit  2021    Yash Chou.  PCP:  Lupe Pacheco MD    1960 MRN [id number]     HISTORY OF PRESENT ILLNESS:  Jay Causey. is a(n) 56year old male who is here for follow-up for lumbar radicu [id number]  [phone]  5/27/2021  3:26 PM

## 2021-05-27 NOTE — PROGRESS NOTES
Neurosurgery Clinic Visit  2021    [facility] Boyd Chou.  PCP:  Iline Felty, MD    1960 MRN [id number]     HISTORY OF PRESENT ILLNESS:  Ellen Alexandra. is a(n) 56year old male who is here for follow-up for lumbar radicu [id number]  [phone]  5/27/2021  3:26 PM

## 2021-05-27 NOTE — PROGRESS NOTES
Pain 3/10 continuously     Pt reports he felt relief for about 8 hrs  Difficulty with left leg, particularly left hip, thigh due to cramping   Gabapentin helps

## 2021-05-28 RX ORDER — ACETAMINOPHEN 500 MG
1000 TABLET ORAL ONCE
Status: CANCELLED | OUTPATIENT
Start: 2021-05-28 | End: 2021-05-28

## 2021-05-28 NOTE — TELEPHONE ENCOUNTER
Received call from OR scheduling that there is no availability for Dr Pk Herrera on 6-7. Next available is June 25th. LMTCB on identified voicemail.

## 2021-05-29 ENCOUNTER — LABORATORY ENCOUNTER (OUTPATIENT)
Dept: LAB | Age: 61
End: 2021-05-29
Attending: NEUROLOGICAL SURGERY
Payer: COMMERCIAL

## 2021-05-29 DIAGNOSIS — M54.16 LUMBAR RADICULOPATHY: ICD-10-CM

## 2021-05-29 DIAGNOSIS — L97.519 ULCER OF RIGHT FOOT, UNSPECIFIED ULCER STAGE (HCC): ICD-10-CM

## 2021-05-29 DIAGNOSIS — Z12.5 SPECIAL SCREENING FOR MALIGNANT NEOPLASM OF PROSTATE: ICD-10-CM

## 2021-05-29 PROCEDURE — 87081 CULTURE SCREEN ONLY: CPT

## 2021-05-29 PROCEDURE — 36415 COLL VENOUS BLD VENIPUNCTURE: CPT

## 2021-05-29 PROCEDURE — 85652 RBC SED RATE AUTOMATED: CPT

## 2021-05-29 PROCEDURE — 84153 ASSAY OF PSA TOTAL: CPT

## 2021-06-01 ENCOUNTER — OFFICE VISIT (OUTPATIENT)
Dept: FAMILY MEDICINE CLINIC | Facility: CLINIC | Age: 61
End: 2021-06-01

## 2021-06-01 VITALS
WEIGHT: 267 LBS | OXYGEN SATURATION: 98 % | DIASTOLIC BLOOD PRESSURE: 76 MMHG | SYSTOLIC BLOOD PRESSURE: 128 MMHG | HEIGHT: 76 IN | BODY MASS INDEX: 32.51 KG/M2 | HEART RATE: 87 BPM | TEMPERATURE: 97 F | RESPIRATION RATE: 16 BRPM

## 2021-06-01 DIAGNOSIS — H93.19 TINNITUS, UNSPECIFIED LATERALITY: ICD-10-CM

## 2021-06-01 DIAGNOSIS — Z01.818 PRE-OPERATIVE CLEARANCE: Primary | ICD-10-CM

## 2021-06-01 DIAGNOSIS — Z99.89 OSA ON CPAP: ICD-10-CM

## 2021-06-01 DIAGNOSIS — E66.9 OBESITY (BMI 30.0-34.9): ICD-10-CM

## 2021-06-01 DIAGNOSIS — E11.59 HYPERTENSION ASSOCIATED WITH DIABETES (HCC): ICD-10-CM

## 2021-06-01 DIAGNOSIS — R70.0 ELEVATED SED RATE: Primary | ICD-10-CM

## 2021-06-01 DIAGNOSIS — M54.16 LUMBAR RADICULOPATHY: ICD-10-CM

## 2021-06-01 DIAGNOSIS — G47.33 OSA ON CPAP: ICD-10-CM

## 2021-06-01 DIAGNOSIS — E11.40 TYPE 2 DIABETES MELLITUS WITH DIABETIC NEUROPATHY, WITHOUT LONG-TERM CURRENT USE OF INSULIN (HCC): ICD-10-CM

## 2021-06-01 DIAGNOSIS — I15.2 HYPERTENSION ASSOCIATED WITH DIABETES (HCC): ICD-10-CM

## 2021-06-01 PROCEDURE — 3074F SYST BP LT 130 MM HG: CPT | Performed by: NURSE PRACTITIONER

## 2021-06-01 PROCEDURE — 3008F BODY MASS INDEX DOCD: CPT | Performed by: NURSE PRACTITIONER

## 2021-06-01 PROCEDURE — 99243 OFF/OP CNSLTJ NEW/EST LOW 30: CPT | Performed by: NURSE PRACTITIONER

## 2021-06-01 PROCEDURE — 3078F DIAST BP <80 MM HG: CPT | Performed by: NURSE PRACTITIONER

## 2021-06-01 NOTE — TELEPHONE ENCOUNTER
Pt calling states his  is showing his upcoming surgery as an \"expected\" date, he is wanting to make sure we have him on the schedule for 6/7/21.          Called and Verified with Surgery Scheduling pt is on 6/7/2021 with 9:45 start time( this is subject

## 2021-06-01 NOTE — TELEPHONE ENCOUNTER
Received fax from [address] re: PT/PTT was drawn on 4.6. 21.   Endorsed to [place]

## 2021-06-01 NOTE — H&P
Jay Causey. is a 56year old male who presents for a pre-operative physical exam. Patient is to have left lumbar 4-lumbar 5 fragmenectomy possible discectomy and all indicated procedures to be done by Dr. Abilio Lamb at [facility] 3   • Glucose Blood In Vitro Strip Test twice daily 100 each 6   • FAMOTIDINE OR Take by mouth as needed.         Allergies: No Known Allergies   Past Medical History:   Diagnosis Date   • Belching     Occasionally   • Bloating     Occasionally   • Constipa bike 3x/week-- up until back pain started.   Diet: Monitors diet- tries to limit sugar/carbs/sodium     REVIEW OF SYSTEMS:   GENERAL: feels well otherwise  SKIN: denies any unusual skin lesions  EYES:denies blurred vision or double vision  HEENT: denies kacie conditions. Pt is a good surgical candidate. This consult was sent back the referring physician, Dr. Hannah Naik. 1. Pre-operative clearance  EKG 04/06/2021: NSR, no changes compared to 10/2018 EKG. 2. Lumbar radiculopathy    3.  JONY on CPAP  D

## 2021-06-03 DIAGNOSIS — E11.40 TYPE 2 DIABETES MELLITUS WITH DIABETIC NEUROPATHY, WITHOUT LONG-TERM CURRENT USE OF INSULIN (HCC): ICD-10-CM

## 2021-06-03 DIAGNOSIS — E11.42 DIABETIC POLYNEUROPATHY ASSOCIATED WITH TYPE 2 DIABETES MELLITUS (HCC): ICD-10-CM

## 2021-06-03 DIAGNOSIS — E11.9 TYPE 2 DIABETES MELLITUS WITHOUT COMPLICATION, WITHOUT LONG-TERM CURRENT USE OF INSULIN (HCC): ICD-10-CM

## 2021-06-03 DIAGNOSIS — E11.59 HYPERTENSION ASSOCIATED WITH DIABETES (HCC): ICD-10-CM

## 2021-06-03 DIAGNOSIS — I15.2 HYPERTENSION ASSOCIATED WITH DIABETES (HCC): ICD-10-CM

## 2021-06-03 RX ORDER — LIRAGLUTIDE 6 MG/ML
0.6 INJECTION SUBCUTANEOUS DAILY
Qty: 9 EACH | Refills: 0 | Status: SHIPPED | OUTPATIENT
Start: 2021-06-03 | End: 2021-08-26

## 2021-06-03 RX ORDER — GLIMEPIRIDE 2 MG/1
TABLET ORAL
Qty: 270 TABLET | Refills: 0 | Status: SHIPPED | OUTPATIENT
Start: 2021-06-03 | End: 2021-08-24

## 2021-06-03 RX ORDER — LOSARTAN POTASSIUM 100 MG/1
100 TABLET ORAL DAILY
Qty: 90 TABLET | Refills: 0 | Status: SHIPPED | OUTPATIENT
Start: 2021-06-03 | End: 2021-08-24

## 2021-06-03 NOTE — TELEPHONE ENCOUNTER
Medical clearance received. Per Alpa Alvarez NP \"Pt has no significant history of cardiac or pulmonary conditions. Pt is a good surgical candidate. \"    Nothing further needed for surgery

## 2021-06-03 NOTE — TELEPHONE ENCOUNTER
[facility] Referral #[id number] approved for outpatient surgery 6/7/21.  Approved from 5/28/21-9/3/21 code [id number]

## 2021-06-04 ENCOUNTER — TELEPHONE (OUTPATIENT)
Dept: SURGERY | Facility: CLINIC | Age: 61
End: 2021-06-04

## 2021-06-04 ENCOUNTER — LAB ENCOUNTER (OUTPATIENT)
Dept: LAB | Age: 61
End: 2021-06-04
Attending: NEUROLOGICAL SURGERY
Payer: COMMERCIAL

## 2021-06-04 DIAGNOSIS — M54.16 LUMBAR RADICULOPATHY: ICD-10-CM

## 2021-06-04 DIAGNOSIS — M54.16 LUMBAR RADICULOPATHY, ACUTE: ICD-10-CM

## 2021-06-04 RX ORDER — GABAPENTIN 300 MG/1
300 CAPSULE ORAL 3 TIMES DAILY
Qty: 90 CAPSULE | Refills: 2 | Status: SHIPPED | OUTPATIENT
Start: 2021-06-04 | End: 2021-11-01 | Stop reason: ALTCHOICE

## 2021-06-04 NOTE — TELEPHONE ENCOUNTER
Received fax from pharmacy requesting clarification on directions for rx. Currently:  Take 1 capsule (300 mg total) by mouth 3 (three) times daily.  100mg tid for 1 week, if tolerates increase to 200mg tid     Looks like this is to be 300mg TID    Rx pen

## 2021-06-07 ENCOUNTER — HOSPITAL ENCOUNTER (OUTPATIENT)
Facility: HOSPITAL | Age: 61
Discharge: HOME | End: 2021-06-07
Attending: NEUROLOGICAL SURGERY | Admitting: NEUROLOGICAL SURGERY
Payer: COMMERCIAL

## 2021-06-07 ENCOUNTER — ANESTHESIA (OUTPATIENT)
Dept: SURGERY | Facility: HOSPITAL | Age: 61
End: 2021-06-07
Payer: COMMERCIAL

## 2021-06-07 ENCOUNTER — ANESTHESIA EVENT (OUTPATIENT)
Dept: SURGERY | Facility: HOSPITAL | Age: 61
End: 2021-06-07
Payer: COMMERCIAL

## 2021-06-07 ENCOUNTER — APPOINTMENT (OUTPATIENT)
Dept: GENERAL RADIOLOGY | Facility: HOSPITAL | Age: 61
End: 2021-06-07
Attending: NEUROLOGICAL SURGERY
Payer: COMMERCIAL

## 2021-06-07 VITALS
TEMPERATURE: 99 F | WEIGHT: 264 LBS | DIASTOLIC BLOOD PRESSURE: 70 MMHG | RESPIRATION RATE: 23 BRPM | SYSTOLIC BLOOD PRESSURE: 106 MMHG | HEART RATE: 101 BPM | OXYGEN SATURATION: 95 % | HEIGHT: 76 IN | BODY MASS INDEX: 32.15 KG/M2

## 2021-06-07 DIAGNOSIS — M54.16 LUMBAR RADICULOPATHY: Primary | ICD-10-CM

## 2021-06-07 PROCEDURE — 76000 FLUOROSCOPY <1 HR PHYS/QHP: CPT | Performed by: NEUROLOGICAL SURGERY

## 2021-06-07 PROCEDURE — 97161 PT EVAL LOW COMPLEX 20 MIN: CPT

## 2021-06-07 PROCEDURE — 01NB0ZZ RELEASE LUMBAR NERVE, OPEN APPROACH: ICD-10-PCS | Performed by: NEUROLOGICAL SURGERY

## 2021-06-07 PROCEDURE — 97165 OT EVAL LOW COMPLEX 30 MIN: CPT

## 2021-06-07 PROCEDURE — 97535 SELF CARE MNGMENT TRAINING: CPT

## 2021-06-07 PROCEDURE — 82962 GLUCOSE BLOOD TEST: CPT

## 2021-06-07 PROCEDURE — 97530 THERAPEUTIC ACTIVITIES: CPT

## 2021-06-07 RX ORDER — BUPIVACAINE HYDROCHLORIDE AND EPINEPHRINE 2.5; 5 MG/ML; UG/ML
INJECTION, SOLUTION EPIDURAL; INFILTRATION; INTRACAUDAL; PERINEURAL AS NEEDED
Status: DISCONTINUED | OUTPATIENT
Start: 2021-06-07 | End: 2021-06-07 | Stop reason: HOSPADM

## 2021-06-07 RX ORDER — SODIUM PHOSPHATE, DIBASIC AND SODIUM PHOSPHATE, MONOBASIC 7; 19 G/133ML; G/133ML
1 ENEMA RECTAL ONCE AS NEEDED
Status: DISCONTINUED | OUTPATIENT
Start: 2021-06-07 | End: 2021-06-07

## 2021-06-07 RX ORDER — CEFAZOLIN SODIUM/WATER 2 G/20 ML
2 SYRINGE (ML) INTRAVENOUS ONCE
Status: COMPLETED | OUTPATIENT
Start: 2021-06-07 | End: 2021-06-07

## 2021-06-07 RX ORDER — DEXAMETHASONE SODIUM PHOSPHATE 4 MG/ML
VIAL (ML) INJECTION AS NEEDED
Status: DISCONTINUED | OUTPATIENT
Start: 2021-06-07 | End: 2021-06-07 | Stop reason: SURG

## 2021-06-07 RX ORDER — NEOSTIGMINE METHYLSULFATE 1 MG/ML
INJECTION INTRAVENOUS AS NEEDED
Status: DISCONTINUED | OUTPATIENT
Start: 2021-06-07 | End: 2021-06-07 | Stop reason: SURG

## 2021-06-07 RX ORDER — HYDROCODONE BITARTRATE AND ACETAMINOPHEN 5; 325 MG/1; MG/1
1 TABLET ORAL AS NEEDED
Status: DISCONTINUED | OUTPATIENT
Start: 2021-06-07 | End: 2021-06-07 | Stop reason: HOSPADM

## 2021-06-07 RX ORDER — INSULIN ASPART 100 [IU]/ML
INJECTION, SOLUTION INTRAVENOUS; SUBCUTANEOUS ONCE
Status: COMPLETED | OUTPATIENT
Start: 2021-06-07 | End: 2021-06-07

## 2021-06-07 RX ORDER — TRAMADOL HYDROCHLORIDE 50 MG/1
TABLET ORAL EVERY 6 HOURS PRN
Qty: 60 TABLET | Refills: 0 | Status: SHIPPED | OUTPATIENT
Start: 2021-06-07 | End: 2021-09-07

## 2021-06-07 RX ORDER — METOCLOPRAMIDE HYDROCHLORIDE 5 MG/ML
10 INJECTION INTRAMUSCULAR; INTRAVENOUS AS NEEDED
Status: DISCONTINUED | OUTPATIENT
Start: 2021-06-07 | End: 2021-06-07 | Stop reason: HOSPADM

## 2021-06-07 RX ORDER — INSULIN ASPART 100 [IU]/ML
INJECTION, SOLUTION INTRAVENOUS; SUBCUTANEOUS
Status: COMPLETED
Start: 2021-06-07 | End: 2021-06-07

## 2021-06-07 RX ORDER — MORPHINE SULFATE 2 MG/ML
2 INJECTION, SOLUTION INTRAMUSCULAR; INTRAVENOUS EVERY 2 HOUR PRN
Status: DISCONTINUED | OUTPATIENT
Start: 2021-06-07 | End: 2021-06-07

## 2021-06-07 RX ORDER — DIPHENHYDRAMINE HYDROCHLORIDE 50 MG/ML
25 INJECTION INTRAMUSCULAR; INTRAVENOUS EVERY 4 HOURS PRN
Status: DISCONTINUED | OUTPATIENT
Start: 2021-06-07 | End: 2021-06-07

## 2021-06-07 RX ORDER — DEXAMETHASONE SODIUM PHOSPHATE 4 MG/ML
4 VIAL (ML) INJECTION AS NEEDED
Status: DISCONTINUED | OUTPATIENT
Start: 2021-06-07 | End: 2021-06-07 | Stop reason: HOSPADM

## 2021-06-07 RX ORDER — CEFAZOLIN SODIUM/WATER 2 G/20 ML
2 SYRINGE (ML) INTRAVENOUS EVERY 8 HOURS
Status: DISCONTINUED | OUTPATIENT
Start: 2021-06-07 | End: 2021-06-07

## 2021-06-07 RX ORDER — SODIUM CHLORIDE, SODIUM LACTATE, POTASSIUM CHLORIDE, CALCIUM CHLORIDE 600; 310; 30; 20 MG/100ML; MG/100ML; MG/100ML; MG/100ML
INJECTION, SOLUTION INTRAVENOUS CONTINUOUS
Status: DISCONTINUED | OUTPATIENT
Start: 2021-06-07 | End: 2021-06-07 | Stop reason: HOSPADM

## 2021-06-07 RX ORDER — DOCUSATE SODIUM 100 MG/1
100 CAPSULE, LIQUID FILLED ORAL 2 TIMES DAILY
Status: DISCONTINUED | OUTPATIENT
Start: 2021-06-07 | End: 2021-06-07

## 2021-06-07 RX ORDER — BISACODYL 10 MG
10 SUPPOSITORY, RECTAL RECTAL
Status: DISCONTINUED | OUTPATIENT
Start: 2021-06-07 | End: 2021-06-07

## 2021-06-07 RX ORDER — MORPHINE SULFATE 4 MG/ML
4 INJECTION, SOLUTION INTRAMUSCULAR; INTRAVENOUS EVERY 2 HOUR PRN
Status: DISCONTINUED | OUTPATIENT
Start: 2021-06-07 | End: 2021-06-07

## 2021-06-07 RX ORDER — GABAPENTIN 300 MG/1
300 CAPSULE ORAL 3 TIMES DAILY
Status: DISCONTINUED | OUTPATIENT
Start: 2021-06-07 | End: 2021-06-07

## 2021-06-07 RX ORDER — DIPHENHYDRAMINE HCL 25 MG
25 CAPSULE ORAL EVERY 4 HOURS PRN
Status: DISCONTINUED | OUTPATIENT
Start: 2021-06-07 | End: 2021-06-07

## 2021-06-07 RX ORDER — HYDROMORPHONE HYDROCHLORIDE 1 MG/ML
INJECTION, SOLUTION INTRAMUSCULAR; INTRAVENOUS; SUBCUTANEOUS
Status: COMPLETED
Start: 2021-06-07 | End: 2021-06-07

## 2021-06-07 RX ORDER — FAMOTIDINE 20 MG/1
20 TABLET ORAL DAILY
Status: DISCONTINUED | OUTPATIENT
Start: 2021-06-08 | End: 2021-06-07

## 2021-06-07 RX ORDER — ROCURONIUM BROMIDE 10 MG/ML
INJECTION, SOLUTION INTRAVENOUS AS NEEDED
Status: DISCONTINUED | OUTPATIENT
Start: 2021-06-07 | End: 2021-06-07 | Stop reason: SURG

## 2021-06-07 RX ORDER — PROCHLORPERAZINE EDISYLATE 5 MG/ML
10 INJECTION INTRAMUSCULAR; INTRAVENOUS EVERY 6 HOURS PRN
Status: DISCONTINUED | OUTPATIENT
Start: 2021-06-07 | End: 2021-06-07

## 2021-06-07 RX ORDER — POLYETHYLENE GLYCOL 3350 17 G/17G
17 POWDER, FOR SOLUTION ORAL DAILY PRN
Status: DISCONTINUED | OUTPATIENT
Start: 2021-06-07 | End: 2021-06-07

## 2021-06-07 RX ORDER — ONDANSETRON 2 MG/ML
4 INJECTION INTRAMUSCULAR; INTRAVENOUS AS NEEDED
Status: DISCONTINUED | OUTPATIENT
Start: 2021-06-07 | End: 2021-06-07 | Stop reason: HOSPADM

## 2021-06-07 RX ORDER — HYDROMORPHONE HYDROCHLORIDE 1 MG/ML
0.4 INJECTION, SOLUTION INTRAMUSCULAR; INTRAVENOUS; SUBCUTANEOUS EVERY 5 MIN PRN
Status: DISCONTINUED | OUTPATIENT
Start: 2021-06-07 | End: 2021-06-07 | Stop reason: HOSPADM

## 2021-06-07 RX ORDER — SODIUM CHLORIDE AND POTASSIUM CHLORIDE .9; .15 G/100ML; G/100ML
75 SOLUTION INTRAVENOUS CONTINUOUS
Status: DISCONTINUED | OUTPATIENT
Start: 2021-06-07 | End: 2021-06-07

## 2021-06-07 RX ORDER — HYDROCODONE BITARTRATE AND ACETAMINOPHEN 5; 325 MG/1; MG/1
2 TABLET ORAL AS NEEDED
Status: DISCONTINUED | OUTPATIENT
Start: 2021-06-07 | End: 2021-06-07 | Stop reason: HOSPADM

## 2021-06-07 RX ORDER — SENNOSIDES 8.6 MG
17.2 TABLET ORAL NIGHTLY
Status: DISCONTINUED | OUTPATIENT
Start: 2021-06-07 | End: 2021-06-07

## 2021-06-07 RX ORDER — TRAMADOL HYDROCHLORIDE 50 MG/1
TABLET ORAL EVERY 6 HOURS PRN
Status: DISCONTINUED | OUTPATIENT
Start: 2021-06-07 | End: 2021-06-07

## 2021-06-07 RX ORDER — GLIMEPIRIDE 2 MG/1
2 TABLET ORAL
Status: DISCONTINUED | OUTPATIENT
Start: 2021-06-07 | End: 2021-06-07

## 2021-06-07 RX ORDER — NALOXONE HYDROCHLORIDE 0.4 MG/ML
80 INJECTION, SOLUTION INTRAMUSCULAR; INTRAVENOUS; SUBCUTANEOUS AS NEEDED
Status: DISCONTINUED | OUTPATIENT
Start: 2021-06-07 | End: 2021-06-07 | Stop reason: HOSPADM

## 2021-06-07 RX ORDER — ONDANSETRON 2 MG/ML
INJECTION INTRAMUSCULAR; INTRAVENOUS AS NEEDED
Status: DISCONTINUED | OUTPATIENT
Start: 2021-06-07 | End: 2021-06-07 | Stop reason: SURG

## 2021-06-07 RX ORDER — DEXTROSE MONOHYDRATE 25 G/50ML
50 INJECTION, SOLUTION INTRAVENOUS
Status: DISCONTINUED | OUTPATIENT
Start: 2021-06-07 | End: 2021-06-07 | Stop reason: HOSPADM

## 2021-06-07 RX ORDER — GLYCOPYRROLATE 0.2 MG/ML
INJECTION, SOLUTION INTRAMUSCULAR; INTRAVENOUS AS NEEDED
Status: DISCONTINUED | OUTPATIENT
Start: 2021-06-07 | End: 2021-06-07 | Stop reason: SURG

## 2021-06-07 RX ORDER — ONDANSETRON 2 MG/ML
4 INJECTION INTRAMUSCULAR; INTRAVENOUS EVERY 4 HOURS PRN
Status: DISCONTINUED | OUTPATIENT
Start: 2021-06-07 | End: 2021-06-07

## 2021-06-07 RX ORDER — MORPHINE SULFATE 2 MG/ML
1 INJECTION, SOLUTION INTRAMUSCULAR; INTRAVENOUS EVERY 2 HOUR PRN
Status: DISCONTINUED | OUTPATIENT
Start: 2021-06-07 | End: 2021-06-07

## 2021-06-07 RX ORDER — GLIMEPIRIDE 2 MG/1
4 TABLET ORAL
Status: DISCONTINUED | OUTPATIENT
Start: 2021-06-08 | End: 2021-06-07

## 2021-06-07 RX ORDER — CYCLOBENZAPRINE HCL 10 MG
10 TABLET ORAL 3 TIMES DAILY PRN
Status: DISCONTINUED | OUTPATIENT
Start: 2021-06-07 | End: 2021-06-07

## 2021-06-07 RX ORDER — DEXTROSE MONOHYDRATE 25 G/50ML
50 INJECTION, SOLUTION INTRAVENOUS
Status: DISCONTINUED | OUTPATIENT
Start: 2021-06-07 | End: 2021-06-07

## 2021-06-07 RX ORDER — EPHEDRINE SULFATE 50 MG/ML
INJECTION INTRAVENOUS AS NEEDED
Status: DISCONTINUED | OUTPATIENT
Start: 2021-06-07 | End: 2021-06-07 | Stop reason: SURG

## 2021-06-07 RX ORDER — METHYLPREDNISOLONE ACETATE 80 MG/ML
INJECTION, SUSPENSION INTRA-ARTICULAR; INTRALESIONAL; INTRAMUSCULAR; SOFT TISSUE AS NEEDED
Status: DISCONTINUED | OUTPATIENT
Start: 2021-06-07 | End: 2021-06-07 | Stop reason: HOSPADM

## 2021-06-07 RX ORDER — MORPHINE SULFATE 0.5 MG/ML
INJECTION, SOLUTION EPIDURAL; INTRATHECAL; INTRAVENOUS AS NEEDED
Status: DISCONTINUED | OUTPATIENT
Start: 2021-06-07 | End: 2021-06-07 | Stop reason: HOSPADM

## 2021-06-07 RX ORDER — LIDOCAINE HYDROCHLORIDE 10 MG/ML
INJECTION, SOLUTION EPIDURAL; INFILTRATION; INTRACAUDAL; PERINEURAL AS NEEDED
Status: DISCONTINUED | OUTPATIENT
Start: 2021-06-07 | End: 2021-06-07 | Stop reason: SURG

## 2021-06-07 RX ORDER — GLIMEPIRIDE 2 MG/1
2 TABLET ORAL
Status: DISCONTINUED | OUTPATIENT
Start: 2021-06-08 | End: 2021-06-07

## 2021-06-07 RX ORDER — METOCLOPRAMIDE HYDROCHLORIDE 5 MG/ML
INJECTION INTRAMUSCULAR; INTRAVENOUS AS NEEDED
Status: DISCONTINUED | OUTPATIENT
Start: 2021-06-07 | End: 2021-06-07 | Stop reason: SURG

## 2021-06-07 RX ORDER — LOSARTAN POTASSIUM 100 MG/1
100 TABLET ORAL DAILY
Status: DISCONTINUED | OUTPATIENT
Start: 2021-06-07 | End: 2021-06-07

## 2021-06-07 RX ORDER — CEPHALEXIN 500 MG/1
500 CAPSULE ORAL 4 TIMES DAILY
Qty: 12 CAPSULE | Refills: 0 | Status: SHIPPED | OUTPATIENT
Start: 2021-06-07 | End: 2021-06-10

## 2021-06-07 RX ADMIN — GLYCOPYRROLATE 0.4 MG: 0.2 INJECTION, SOLUTION INTRAMUSCULAR; INTRAVENOUS at 12:36:00

## 2021-06-07 RX ADMIN — NEOSTIGMINE METHYLSULFATE 3 MG: 1 INJECTION INTRAVENOUS at 12:36:00

## 2021-06-07 RX ADMIN — DEXAMETHASONE SODIUM PHOSPHATE 4 MG: 4 MG/ML VIAL (ML) INJECTION at 09:54:00

## 2021-06-07 RX ADMIN — CEFAZOLIN SODIUM/WATER 2 G: 2 G/20 ML SYRINGE (ML) INTRAVENOUS at 10:07:00

## 2021-06-07 RX ADMIN — ONDANSETRON 4 MG: 2 INJECTION INTRAMUSCULAR; INTRAVENOUS at 09:54:00

## 2021-06-07 RX ADMIN — ROCURONIUM BROMIDE 50 MG: 10 INJECTION, SOLUTION INTRAVENOUS at 09:54:00

## 2021-06-07 RX ADMIN — METOCLOPRAMIDE HYDROCHLORIDE 10 MG: 5 INJECTION INTRAMUSCULAR; INTRAVENOUS at 09:54:00

## 2021-06-07 RX ADMIN — LIDOCAINE HYDROCHLORIDE 25 MG: 10 INJECTION, SOLUTION EPIDURAL; INFILTRATION; INTRACAUDAL; PERINEURAL at 09:54:00

## 2021-06-07 RX ADMIN — EPHEDRINE SULFATE 5 MG: 50 INJECTION INTRAVENOUS at 11:26:00

## 2021-06-07 NOTE — ANESTHESIA PROCEDURE NOTES
Airway  Date/Time: 6/7/2021 9:55 AM  Urgency: elective      General Information and Staff    Patient location during procedure: OR  Anesthesiologist: Smiley Ordonez MD  Performed: anesthesiologist     Indications and Patient Condition  Indications for airwa

## 2021-06-07 NOTE — INTERVAL H&P NOTE
Pre-op Diagnosis: Lumbar radiculopathy [M54.16]    The above referenced H&P was reviewed by Martha Porter PA-C on 6/7/2021, the patient was examined and no significant changes have occurred in the patient's condition since the H&P was performed.   I discu
Pre-op Diagnosis: Lumbar radiculopathy [M54.16]    The above referenced H&P was reviewed by Peter Omalley MD on 6/7/2021, the patient was examined and no significant changes have occurred in the patient's condition since the H&P was performed.   I dis
Negative

## 2021-06-07 NOTE — CONSULTS
[facility]  CONSULT     Griselda Chou.  Patient Status:  Outpatient in a Bed    1960 MRN [id number]   [facility] 3SW-A Attending Elie Aguilera MD   Hosp Day # 0 PCP Carlos Quiros MD     Reason for cons reports current alcohol use of about 1.0 - 2.0 standard drinks of alcohol per week. He reports that he does not use drugs.     Family History:   Family History   Problem Relation Age of Onset   • Cancer Father         colon   • Colon Cancer Father         C extremities. Extremities: No edema or cyanosis. Integument: No rashes or lesions. Psychiatric: Appropriate mood and affect. Diagnostic Data:      Labs:  No results for input(s): WBC, HGB, MCV, PLT, BAND, INR in the last 168 hours.     Invalid input

## 2021-06-07 NOTE — PROGRESS NOTES
NURSING ADMISSION NOTE      Patient admitted from PACU s/p L4-L5 fragmentectomy. Oriented to room. Safety precautions initiated. Bed in low position. Call light in reach. PAMELLA WINCHESTER paged with new consult.

## 2021-06-07 NOTE — ANESTHESIA POSTPROCEDURE EVALUATION
[facility].  Patient Status:  Outpatient in a Bed   Age/Gender 56year old male MRN [id number]   [facility] SURGERY Attending Landry Fair MD   Hosp Day # 0 PCP Alissa Mendez MD       Anesthesia P

## 2021-06-07 NOTE — OCCUPATIONAL THERAPY NOTE
OCCUPATIONAL THERAPY QUICK EVALUATION - INPATIENT    Room Number: 357/357-A  Evaluation Date: 6/7/2021     Type of Evaluation: Quick Eval  Presenting Problem:  L4-L5 fragmentectomy.     Physician Order: IP Consult to Occupational Therapy  Reason for Therapy sx.    SUBJECTIVE  \"My leg feels so much better! \" re: L leg    Patient self-stated goal is to go home.     OBJECTIVE  Precautions: Spine  Fall Risk: Standard fall risk    WEIGHT BEARING RESTRICTION  Weight Bearing Restriction: None                PAIN ASS dressing and deferring to spouse assist at this time. UB Dress: IND at seated for donning shirt with no AD  Grooming: MOD I at standing for washing hands with RW  Toileting: SUPV at toilet transfers with RW  Bathing: not tested.  Discussed possible use of or above; patient reports will have supervision at home  Patient able to dress lower extremities:  At supervision level or above; patient reports will have supervision at home  Patient/Caregiver able to demonstrate safety with ADLS: At supervision level or

## 2021-06-07 NOTE — OPERATIVE REPORT
Operative Note    Patient Name: Sheree Posey.     Preoperative Diagnosis: Lumbar radiculopathy [M54.16]    Postoperative Diagnosis: same    Primary Surgeon: Kay Meek    Assistant: Yaw Camacho pa-c,  who was essential to the case in achieved. Start of Avitene was with Depo-Medrol Duramorph was placed over the nerve. The wound was then closed in layers.   Patient was awakened taken recovery    Dictated but not proofread

## 2021-06-07 NOTE — BRIEF OP NOTE
Pre-Operative Diagnosis: Lumbar radiculopathy [M54.16]     Post-Operative Diagnosis: Lumbar radiculopathy [M54.16]      Procedure Performed:   LEFT LUMBAR 4 - LUMBAR 5 FRAGMENTECTOMY, POSSIBLE DISCECTOMY AND ALL INDICATED PROCEDURES.     Surgeon(s) and Role

## 2021-06-07 NOTE — PHYSICAL THERAPY NOTE
PHYSICAL THERAPY QUICK EVALUATION - INPATIENT    Room Number: 357/357-A  Evaluation Date: 6/7/2021  Presenting Problem: s/p L4-L5 fragmentectomy on 6/7/21  Physician Order: PT Eval and Treat    Problem List  Active Problems:    * No active hospital probl Spine  Fall Risk: Standard fall risk    WEIGHT BEARING RESTRICTION  Weight Bearing Restriction: None                PAIN ASSESSMENT  Ratin  Location: back incision  Management Techniques: Body mechanics;Repositioning; Other (Comment) (spine precautions; stand = mod indep  ( x 2)  Stand to sit = mod indep  ( x 2)    Simulated car transfer = NT      VC's for expectations, t/f tech's, pain control tech's. Appropriately sized RW for pt. Instructed in log rolling and vc's provided.    Demonstrated Pricila Garibay goggle. Pt in surgical mask in [facility]. Therapist accompanied by inpt OT. Pt's wife in surgical mask.

## 2021-06-07 NOTE — PLAN OF CARE
Mild back pain controlled with PO pain medication. Denies numbness and tingling. Dermabond to site, approximated and open to air. VS stable on room air. Tolerating diet, denies nausea. Voiding freely. Worked with PT, ambulating x1 assist with walker.  TEDs/

## 2021-06-08 ENCOUNTER — PATIENT MESSAGE (OUTPATIENT)
Dept: SURGERY | Facility: CLINIC | Age: 61
End: 2021-06-08

## 2021-06-09 NOTE — TELEPHONE ENCOUNTER
From: Anni Dixon. To: Drake Davis MD  Sent: 6/8/2021 4:49 PM CDT  Subject: Other    What the heck was that thing you took out of my spine? I am surprised by its size. BTW, my back and leg pain is gone. Thank you so much.  I am grate

## 2021-06-14 ENCOUNTER — OFFICE VISIT (OUTPATIENT)
Dept: FAMILY MEDICINE CLINIC | Facility: CLINIC | Age: 61
End: 2021-06-14

## 2021-06-14 VITALS
SYSTOLIC BLOOD PRESSURE: 136 MMHG | HEIGHT: 76 IN | WEIGHT: 258 LBS | TEMPERATURE: 98 F | RESPIRATION RATE: 16 BRPM | HEART RATE: 84 BPM | DIASTOLIC BLOOD PRESSURE: 84 MMHG | BODY MASS INDEX: 31.42 KG/M2

## 2021-06-14 DIAGNOSIS — E11.59 HYPERTENSION ASSOCIATED WITH DIABETES (HCC): ICD-10-CM

## 2021-06-14 DIAGNOSIS — Z98.890 STATUS POST LUMBAR SURGERY: ICD-10-CM

## 2021-06-14 DIAGNOSIS — E11.40 TYPE 2 DIABETES MELLITUS WITH DIABETIC NEUROPATHY, WITHOUT LONG-TERM CURRENT USE OF INSULIN (HCC): ICD-10-CM

## 2021-06-14 DIAGNOSIS — D12.4 BENIGN NEOPLASM OF DESCENDING COLON: ICD-10-CM

## 2021-06-14 DIAGNOSIS — Z23 NEED FOR VACCINATION: Primary | ICD-10-CM

## 2021-06-14 DIAGNOSIS — I15.2 HYPERTENSION ASSOCIATED WITH DIABETES (HCC): ICD-10-CM

## 2021-06-14 DIAGNOSIS — E11.42 DIABETIC POLYNEUROPATHY ASSOCIATED WITH TYPE 2 DIABETES MELLITUS (HCC): ICD-10-CM

## 2021-06-14 PROCEDURE — 90732 PPSV23 VACC 2 YRS+ SUBQ/IM: CPT | Performed by: FAMILY MEDICINE

## 2021-06-14 PROCEDURE — 90471 IMMUNIZATION ADMIN: CPT | Performed by: FAMILY MEDICINE

## 2021-06-14 PROCEDURE — 99214 OFFICE O/P EST MOD 30 MIN: CPT | Performed by: FAMILY MEDICINE

## 2021-06-14 PROCEDURE — 3008F BODY MASS INDEX DOCD: CPT | Performed by: FAMILY MEDICINE

## 2021-06-14 PROCEDURE — 3075F SYST BP GE 130 - 139MM HG: CPT | Performed by: FAMILY MEDICINE

## 2021-06-14 PROCEDURE — 3079F DIAST BP 80-89 MM HG: CPT | Performed by: FAMILY MEDICINE

## 2021-06-14 RX ORDER — PEN NEEDLE, DIABETIC 31 GX5/16"
NEEDLE, DISPOSABLE MISCELLANEOUS
Qty: 90 EACH | Refills: 3 | Status: SHIPPED | OUTPATIENT
Start: 2021-06-14

## 2021-06-14 RX ORDER — PEN NEEDLE, DIABETIC 30 GX3/16"
1 NEEDLE, DISPOSABLE MISCELLANEOUS DAILY
Qty: 90 EACH | Refills: 3 | Status: SHIPPED | OUTPATIENT
Start: 2021-06-14

## 2021-06-14 NOTE — PROGRESS NOTES
HPI/Subjective:   Montez Shukla is a 56year old male who presents for Hospital F/U (left lumbar fragmentectomy)     Patient is here for follow-up after his recent lumbar spine surgery. He believes he is doing well.   His pain is significantl (117 kg).   General appearance: alert, appears stated age and cooperative  Head: Normocephalic, without obvious abnormality, atraumatic  Neck: no adenopathy, no carotid bruit, no JVD, supple, symmetrical, trachea midline and thyroid not enlarged, symmetric,

## 2021-06-25 ENCOUNTER — OFFICE VISIT (OUTPATIENT)
Dept: SURGERY | Facility: CLINIC | Age: 61
End: 2021-06-25

## 2021-06-25 VITALS
WEIGHT: 258 LBS | SYSTOLIC BLOOD PRESSURE: 126 MMHG | HEART RATE: 71 BPM | BODY MASS INDEX: 31.42 KG/M2 | DIASTOLIC BLOOD PRESSURE: 72 MMHG | HEIGHT: 76 IN

## 2021-06-25 DIAGNOSIS — M54.16 LUMBAR RADICULOPATHY: Primary | ICD-10-CM

## 2021-06-25 DIAGNOSIS — R29.898 LEFT LEG WEAKNESS: ICD-10-CM

## 2021-06-25 PROCEDURE — 3078F DIAST BP <80 MM HG: CPT | Performed by: PHYSICIAN ASSISTANT

## 2021-06-25 PROCEDURE — 3008F BODY MASS INDEX DOCD: CPT | Performed by: PHYSICIAN ASSISTANT

## 2021-06-25 PROCEDURE — 3074F SYST BP LT 130 MM HG: CPT | Performed by: PHYSICIAN ASSISTANT

## 2021-06-25 PROCEDURE — 99024 POSTOP FOLLOW-UP VISIT: CPT | Performed by: PHYSICIAN ASSISTANT

## 2021-06-25 NOTE — PROGRESS NOTES
Patient here for 2-week postop follow-up, sx 06/07/21. Feels improved after surgery. Having some weakness in left leg and overcompensating with right leg.

## 2021-06-25 NOTE — PROGRESS NOTES
Neurosurgery Clinic Visit  2021    Luba Chou. PCP:  Rex Bridges MD    1960 MRN [id number]       CC:  S/P Left L4-5 fragmentectomy 21    HPI:    Candice Katz is here for 2 week post op appt. He feels great.   Pre-op pain i Milka  6/25/2021  2:08 PM

## 2021-07-20 ENCOUNTER — PATIENT MESSAGE (OUTPATIENT)
Dept: FAMILY MEDICINE CLINIC | Facility: CLINIC | Age: 61
End: 2021-07-20

## 2021-07-20 DIAGNOSIS — E11.40 TYPE 2 DIABETES MELLITUS WITH DIABETIC NEUROPATHY, WITHOUT LONG-TERM CURRENT USE OF INSULIN (HCC): Primary | ICD-10-CM

## 2021-07-20 NOTE — TELEPHONE ENCOUNTER
From: Cat Llanes. To: Kristine Hopper MD  Sent: 7/20/2021 3:52 PM CDT  Subject: Referral Request    I would like to see an endocrinologist to follow up on my diabetes.  We have discussed this in the past and I think that it would be a go

## 2021-07-26 ENCOUNTER — TELEPHONE (OUTPATIENT)
Dept: SURGERY | Facility: CLINIC | Age: 61
End: 2021-07-26

## 2021-07-26 NOTE — TELEPHONE ENCOUNTER
Patient calling to request refill of traMADol HCl 50 MG Oral Tab  [facility] [phone], [phone]    Patient informed of 48 hour refill policy excluding weekends and holidays.   Informed max

## 2021-07-26 NOTE — TELEPHONE ENCOUNTER
Surgery was approximately 6 weeks ago. Can we determine how the patient is progressing? Has his pain improved? What dosing of Tramadol is he taking? Recommend continue to work to wean.      Please discuss and we can determine appropriate refill, thank y

## 2021-07-26 NOTE — TELEPHONE ENCOUNTER
Medication: Tramadol    Date of last refill: 6/7/21 (#60/0)  [place]      Last office visit: 6/25/21  Due back to clinic per last office note:  /u with Dr. Bula Gottron in 1 month.   Date next office visit scheduled:    Lelo

## 2021-07-27 RX ORDER — TRAMADOL HYDROCHLORIDE 50 MG/1
50 TABLET ORAL EVERY 12 HOURS PRN
Qty: 40 TABLET | Refills: 0 | Status: SHIPPED | OUTPATIENT
Start: 2021-07-27 | End: 2021-11-19

## 2021-07-27 NOTE — TELEPHONE ENCOUNTER
Called pt who states that he feels that he is progressing fine. He states he is taking his Tramadol once every few days for break through pain.  Pt states that he no longer has pain on the left side of his body as he did prior to surgery, but feels that the

## 2021-08-12 ENCOUNTER — TELEPHONE (OUTPATIENT)
Dept: PHYSICAL THERAPY | Facility: HOSPITAL | Age: 61
End: 2021-08-12

## 2021-08-16 ENCOUNTER — TELEPHONE (OUTPATIENT)
Dept: PHYSICAL THERAPY | Facility: HOSPITAL | Age: 61
End: 2021-08-16

## 2021-08-17 ENCOUNTER — APPOINTMENT (OUTPATIENT)
Dept: PHYSICAL THERAPY | Facility: HOSPITAL | Age: 61
End: 2021-08-17
Attending: PHYSICIAN ASSISTANT
Payer: COMMERCIAL

## 2021-08-19 ENCOUNTER — OFFICE VISIT (OUTPATIENT)
Dept: PHYSICAL THERAPY | Facility: HOSPITAL | Age: 61
End: 2021-08-19
Attending: PHYSICIAN ASSISTANT
Payer: COMMERCIAL

## 2021-08-19 ENCOUNTER — TELEPHONE (OUTPATIENT)
Dept: FAMILY MEDICINE CLINIC | Facility: CLINIC | Age: 61
End: 2021-08-19

## 2021-08-19 ENCOUNTER — PATIENT MESSAGE (OUTPATIENT)
Dept: FAMILY MEDICINE CLINIC | Facility: CLINIC | Age: 61
End: 2021-08-19

## 2021-08-19 DIAGNOSIS — E11.42 DIABETIC POLYNEUROPATHY ASSOCIATED WITH TYPE 2 DIABETES MELLITUS (HCC): ICD-10-CM

## 2021-08-19 DIAGNOSIS — R29.898 LEFT LEG WEAKNESS: ICD-10-CM

## 2021-08-19 DIAGNOSIS — I15.2 HYPERTENSION ASSOCIATED WITH DIABETES (HCC): ICD-10-CM

## 2021-08-19 DIAGNOSIS — M54.16 LUMBAR RADICULOPATHY: ICD-10-CM

## 2021-08-19 DIAGNOSIS — E11.40 TYPE 2 DIABETES MELLITUS WITH DIABETIC NEUROPATHY, WITHOUT LONG-TERM CURRENT USE OF INSULIN (HCC): ICD-10-CM

## 2021-08-19 DIAGNOSIS — E11.9 TYPE 2 DIABETES MELLITUS WITHOUT COMPLICATION, WITHOUT LONG-TERM CURRENT USE OF INSULIN (HCC): ICD-10-CM

## 2021-08-19 DIAGNOSIS — E11.59 HYPERTENSION ASSOCIATED WITH DIABETES (HCC): ICD-10-CM

## 2021-08-19 PROCEDURE — 97110 THERAPEUTIC EXERCISES: CPT | Performed by: PHYSICAL THERAPIST

## 2021-08-19 PROCEDURE — 97163 PT EVAL HIGH COMPLEX 45 MIN: CPT | Performed by: PHYSICAL THERAPIST

## 2021-08-19 NOTE — TELEPHONE ENCOUNTER
LOV 04/01/2021. My chart message sent to pt. reminding him he is due for fasting labs and a follow up diabetic visit with Dr. Janine Cavanaugh.

## 2021-08-19 NOTE — PROGRESS NOTES
SPINE EVALUATION:   Referring Physician: Dr. Yesenia Johnson  Diagnosis: Lumbar radiculopathy (M54.16)  Left leg weakness (T91.284)       Date of Service: 8/19/2021     PATIENT SUMMARY   Delta Dalal is a 56year old male who presents to therapy to on floor. Tightness. No wake up at nite. - cough Genie Osier  - bowel/bladder . [facility] describes prior level of function independent with all adl''s . Pt goals include to get stronger, want to play  golf.   Past medical history was reviewed with [facility] without reported pain. Skilled Physical Therapy is medically necessary to address the above impairments and reach functional goals. Precautions:  Strengthening and conditioning s/p lumbar decompression. No bending, twisting of the back.   No lifting mo activity modifications, possible soreness after evaluation, modalities as needed [ice/heat], postural corrections, ergonomics, pain science education , detrimental fear avoidance behaviors, importance of remaining active and shoe wear  Patient was instruct [phone].  If you have any questions, please contact me at Dept: [phone]    Sincerely,  Electronically signed by therapist: Joel Charles PT    [de-identified] certification required: Yes  I certify the need for these services furnished under this plan

## 2021-08-24 ENCOUNTER — OFFICE VISIT (OUTPATIENT)
Dept: PHYSICAL THERAPY | Facility: HOSPITAL | Age: 61
End: 2021-08-24
Attending: PHYSICIAN ASSISTANT
Payer: COMMERCIAL

## 2021-08-24 DIAGNOSIS — R29.898 LEFT LEG WEAKNESS: ICD-10-CM

## 2021-08-24 DIAGNOSIS — M54.16 LUMBAR RADICULOPATHY: ICD-10-CM

## 2021-08-24 PROCEDURE — 97140 MANUAL THERAPY 1/> REGIONS: CPT | Performed by: PHYSICAL THERAPIST

## 2021-08-24 PROCEDURE — 97112 NEUROMUSCULAR REEDUCATION: CPT | Performed by: PHYSICAL THERAPIST

## 2021-08-24 PROCEDURE — 97110 THERAPEUTIC EXERCISES: CPT | Performed by: PHYSICAL THERAPIST

## 2021-08-24 RX ORDER — LOSARTAN POTASSIUM 100 MG/1
100 TABLET ORAL DAILY
Qty: 90 TABLET | Refills: 0 | Status: SHIPPED | OUTPATIENT
Start: 2021-08-24 | End: 2022-02-03

## 2021-08-24 RX ORDER — GLIMEPIRIDE 2 MG/1
TABLET ORAL
Qty: 270 TABLET | Refills: 0 | Status: SHIPPED | OUTPATIENT
Start: 2021-08-24 | End: 2021-10-26 | Stop reason: DRUGHIGH

## 2021-08-26 ENCOUNTER — OFFICE VISIT (OUTPATIENT)
Dept: PHYSICAL THERAPY | Facility: HOSPITAL | Age: 61
End: 2021-08-26
Attending: PHYSICIAN ASSISTANT
Payer: COMMERCIAL

## 2021-08-26 DIAGNOSIS — M54.16 LUMBAR RADICULOPATHY: ICD-10-CM

## 2021-08-26 DIAGNOSIS — R29.898 LEFT LEG WEAKNESS: ICD-10-CM

## 2021-08-26 PROCEDURE — 97110 THERAPEUTIC EXERCISES: CPT | Performed by: PHYSICAL THERAPIST

## 2021-08-26 PROCEDURE — 97112 NEUROMUSCULAR REEDUCATION: CPT | Performed by: PHYSICAL THERAPIST

## 2021-08-26 PROCEDURE — 97140 MANUAL THERAPY 1/> REGIONS: CPT | Performed by: PHYSICAL THERAPIST

## 2021-08-26 RX ORDER — LIRAGLUTIDE 6 MG/ML
0.6 INJECTION SUBCUTANEOUS DAILY
Qty: 3 EACH | Refills: 1 | Status: SHIPPED | OUTPATIENT
Start: 2021-08-26

## 2021-08-27 NOTE — PROGRESS NOTES
4 pt opp arm   Tr A   Bent knee fallow   Dx: Lumbar radiculopathy (M54.16)  Left leg weakness (R29.898)         Insurance (Authorized # of Visits):  6           Authorizing Physician: Dr. Iván Deluna  Next MD visit: none scheduled  Fall Risk: standard         Pr

## 2021-09-07 ENCOUNTER — OFFICE VISIT (OUTPATIENT)
Dept: PHYSICAL THERAPY | Facility: HOSPITAL | Age: 61
End: 2021-09-07
Attending: PHYSICIAN ASSISTANT
Payer: COMMERCIAL

## 2021-09-07 ENCOUNTER — OFFICE VISIT (OUTPATIENT)
Dept: SURGERY | Facility: CLINIC | Age: 61
End: 2021-09-07

## 2021-09-07 VITALS
SYSTOLIC BLOOD PRESSURE: 144 MMHG | HEIGHT: 76 IN | BODY MASS INDEX: 31.42 KG/M2 | WEIGHT: 258 LBS | DIASTOLIC BLOOD PRESSURE: 84 MMHG

## 2021-09-07 DIAGNOSIS — M54.16 LUMBAR RADICULOPATHY, ACUTE: Primary | ICD-10-CM

## 2021-09-07 DIAGNOSIS — R29.898 LEFT LEG WEAKNESS: ICD-10-CM

## 2021-09-07 DIAGNOSIS — M54.16 LUMBAR RADICULOPATHY: ICD-10-CM

## 2021-09-07 PROCEDURE — 97140 MANUAL THERAPY 1/> REGIONS: CPT | Performed by: PHYSICAL THERAPIST

## 2021-09-07 PROCEDURE — 3077F SYST BP >= 140 MM HG: CPT | Performed by: NEUROLOGICAL SURGERY

## 2021-09-07 PROCEDURE — 97112 NEUROMUSCULAR REEDUCATION: CPT | Performed by: PHYSICAL THERAPIST

## 2021-09-07 PROCEDURE — 97110 THERAPEUTIC EXERCISES: CPT | Performed by: PHYSICAL THERAPIST

## 2021-09-07 PROCEDURE — 3008F BODY MASS INDEX DOCD: CPT | Performed by: NEUROLOGICAL SURGERY

## 2021-09-07 PROCEDURE — 3079F DIAST BP 80-89 MM HG: CPT | Performed by: NEUROLOGICAL SURGERY

## 2021-09-07 PROCEDURE — 99024 POSTOP FOLLOW-UP VISIT: CPT | Performed by: NEUROLOGICAL SURGERY

## 2021-09-07 RX ORDER — CYCLOBENZAPRINE HCL 10 MG
10 TABLET ORAL 3 TIMES DAILY PRN
Qty: 60 TABLET | Refills: 1 | Status: SHIPPED | OUTPATIENT
Start: 2021-09-07

## 2021-09-07 NOTE — PROGRESS NOTES
Feeling good  Still having some lingering effects from sx    thight muscle on the left is tight, and won't relax    Calf muscle is cramped most of the time ,and is overcompensating with the right leg and having some back pain on the right, but there are ot

## 2021-09-07 NOTE — PROGRESS NOTES
Neurosurgery Clinic Visit  2021    [facility], [facility]..  PCP:  Faustino Montoya MD    1960 MRN [id number]     HISTORY OF PRESENT ILLNESS:  Jessica Vail. is a(n) 56year old male here for follow-up status post fragmentectom

## 2021-09-07 NOTE — PROGRESS NOTES
Dx: Lumbar radiculopathy (M54.16)  Left leg weakness (R29.898)         Insurance (Authorized # of Visits):  6           Authorizing Physician: Dr. Robles Carolina  Next MD visit: none scheduled  Fall Risk: standard         Precautions: n/a             Subjective: see patient instructions     Charges: 1 NMR 1 Manual 1 TE        Total Timed Treatment: 44 min  Total Treatment Time: 45 min

## 2021-09-09 ENCOUNTER — APPOINTMENT (OUTPATIENT)
Dept: PHYSICAL THERAPY | Facility: HOSPITAL | Age: 61
End: 2021-09-09
Attending: PHYSICIAN ASSISTANT
Payer: COMMERCIAL

## 2021-09-14 ENCOUNTER — APPOINTMENT (OUTPATIENT)
Dept: PHYSICAL THERAPY | Facility: HOSPITAL | Age: 61
End: 2021-09-14
Attending: PHYSICIAN ASSISTANT
Payer: COMMERCIAL

## 2021-09-16 ENCOUNTER — OFFICE VISIT (OUTPATIENT)
Dept: PHYSICAL THERAPY | Facility: HOSPITAL | Age: 61
End: 2021-09-16
Attending: PHYSICIAN ASSISTANT
Payer: COMMERCIAL

## 2021-09-16 DIAGNOSIS — R29.898 LEFT LEG WEAKNESS: ICD-10-CM

## 2021-09-16 DIAGNOSIS — M54.16 LUMBAR RADICULOPATHY: ICD-10-CM

## 2021-09-16 PROCEDURE — 97112 NEUROMUSCULAR REEDUCATION: CPT | Performed by: PHYSICAL THERAPIST

## 2021-09-16 PROCEDURE — 97140 MANUAL THERAPY 1/> REGIONS: CPT | Performed by: PHYSICAL THERAPIST

## 2021-09-16 PROCEDURE — 97110 THERAPEUTIC EXERCISES: CPT | Performed by: PHYSICAL THERAPIST

## 2021-09-18 NOTE — PROGRESS NOTES
Dx: Lumbar radiculopathy (M54.16)  Left leg weakness (R29.898)         Insurance (Authorized # of Visits):  6           Authorizing Physician: Dr. Demetrius Back  Next MD visit: none scheduled  Fall Risk: standard         Precautions: n/a             Subjective: min

## 2021-09-20 ENCOUNTER — APPOINTMENT (OUTPATIENT)
Dept: PHYSICAL THERAPY | Facility: HOSPITAL | Age: 61
End: 2021-09-20
Attending: PHYSICIAN ASSISTANT
Payer: COMMERCIAL

## 2021-09-21 ENCOUNTER — OFFICE VISIT (OUTPATIENT)
Dept: PHYSICAL THERAPY | Facility: HOSPITAL | Age: 61
End: 2021-09-21
Attending: PHYSICIAN ASSISTANT
Payer: COMMERCIAL

## 2021-09-21 PROCEDURE — 97140 MANUAL THERAPY 1/> REGIONS: CPT | Performed by: PHYSICAL THERAPIST

## 2021-09-21 PROCEDURE — 97112 NEUROMUSCULAR REEDUCATION: CPT | Performed by: PHYSICAL THERAPIST

## 2021-09-21 PROCEDURE — 97110 THERAPEUTIC EXERCISES: CPT | Performed by: PHYSICAL THERAPIST

## 2021-09-22 NOTE — PROGRESS NOTES
Dx: Lumbar radiculopathy (M54.16)  Left leg weakness (R29.898)         Insurance (Authorized # of Visits):  6           Authorizing Physician: Dr. Nicholas Guo  Next MD visit: none scheduled  Fall Risk: standard         Precautions: n/a             Subjective: Rhythmic stab in [place] with both UE/LE  STM to ITB, gm  sidelying right x9'    Supine sb x20 :  LBR   DKTC  Bridge with just buttocks squeeze. Bent knee fall outs x20 grn tb    Supine sb x20 :  LBR   DKTC  Bridge with just buttocks squeeze.    Bent kn

## 2021-10-04 ENCOUNTER — APPOINTMENT (OUTPATIENT)
Dept: PHYSICAL THERAPY | Facility: HOSPITAL | Age: 61
End: 2021-10-04
Attending: PHYSICIAN ASSISTANT
Payer: COMMERCIAL

## 2021-10-18 ENCOUNTER — LABORATORY ENCOUNTER (OUTPATIENT)
Dept: LAB | Age: 61
End: 2021-10-18
Attending: NURSE PRACTITIONER
Payer: COMMERCIAL

## 2021-10-18 DIAGNOSIS — E11.40 TYPE 2 DIABETES MELLITUS WITH DIABETIC NEUROPATHY, WITHOUT LONG-TERM CURRENT USE OF INSULIN (HCC): ICD-10-CM

## 2021-10-18 DIAGNOSIS — R70.0 ELEVATED SED RATE: ICD-10-CM

## 2021-10-18 DIAGNOSIS — L97.519 ULCER OF RIGHT FOOT, UNSPECIFIED ULCER STAGE (HCC): ICD-10-CM

## 2021-10-18 PROCEDURE — 82043 UR ALBUMIN QUANTITATIVE: CPT

## 2021-10-18 PROCEDURE — 80061 LIPID PANEL: CPT

## 2021-10-18 PROCEDURE — 3051F HG A1C>EQUAL 7.0%<8.0%: CPT | Performed by: FAMILY MEDICINE

## 2021-10-18 PROCEDURE — 85025 COMPLETE CBC W/AUTO DIFF WBC: CPT

## 2021-10-18 PROCEDURE — 85652 RBC SED RATE AUTOMATED: CPT

## 2021-10-18 PROCEDURE — 82570 ASSAY OF URINE CREATININE: CPT

## 2021-10-18 PROCEDURE — 3061F NEG MICROALBUMINURIA REV: CPT | Performed by: FAMILY MEDICINE

## 2021-10-18 PROCEDURE — 83036 HEMOGLOBIN GLYCOSYLATED A1C: CPT

## 2021-10-18 PROCEDURE — 36415 COLL VENOUS BLD VENIPUNCTURE: CPT

## 2021-10-19 ENCOUNTER — OFFICE VISIT (OUTPATIENT)
Dept: FAMILY MEDICINE CLINIC | Facility: CLINIC | Age: 61
End: 2021-10-19

## 2021-10-19 VITALS
DIASTOLIC BLOOD PRESSURE: 80 MMHG | WEIGHT: 272 LBS | RESPIRATION RATE: 18 BRPM | TEMPERATURE: 98 F | SYSTOLIC BLOOD PRESSURE: 132 MMHG | HEART RATE: 74 BPM | HEIGHT: 76 IN | BODY MASS INDEX: 33.12 KG/M2

## 2021-10-19 DIAGNOSIS — E11.59 HYPERTENSION ASSOCIATED WITH DIABETES (HCC): ICD-10-CM

## 2021-10-19 DIAGNOSIS — I15.2 HYPERTENSION ASSOCIATED WITH DIABETES (HCC): ICD-10-CM

## 2021-10-19 DIAGNOSIS — M14.60 CHARCOT'S ARTHROPATHY: ICD-10-CM

## 2021-10-19 DIAGNOSIS — Z99.89 OSA ON CPAP: ICD-10-CM

## 2021-10-19 DIAGNOSIS — E11.42 DIABETIC POLYNEUROPATHY ASSOCIATED WITH TYPE 2 DIABETES MELLITUS (HCC): ICD-10-CM

## 2021-10-19 DIAGNOSIS — D22.9 SUSPICIOUS NEVUS: ICD-10-CM

## 2021-10-19 DIAGNOSIS — E11.40 TYPE 2 DIABETES MELLITUS WITH DIABETIC NEUROPATHY, WITHOUT LONG-TERM CURRENT USE OF INSULIN (HCC): Primary | ICD-10-CM

## 2021-10-19 DIAGNOSIS — G47.33 OSA ON CPAP: ICD-10-CM

## 2021-10-19 PROCEDURE — 3008F BODY MASS INDEX DOCD: CPT | Performed by: FAMILY MEDICINE

## 2021-10-19 PROCEDURE — 99214 OFFICE O/P EST MOD 30 MIN: CPT | Performed by: FAMILY MEDICINE

## 2021-10-19 PROCEDURE — 3079F DIAST BP 80-89 MM HG: CPT | Performed by: FAMILY MEDICINE

## 2021-10-19 PROCEDURE — 3075F SYST BP GE 130 - 139MM HG: CPT | Performed by: FAMILY MEDICINE

## 2021-10-19 NOTE — PROGRESS NOTES
HPI:   Mitra Meyer. is a 59year old male who presents for recheck of his diabetes. Patient’s FBS have been 110-130's. His last hemoglobin A1c was 7.2 on 4/6/2021. A new hemoglobin A1c was pending at the time of this dictation.   His last Cholesterol (mg/dL)   Date Value   10/18/2021 100 (H)   08/13/2020 72   06/28/2019 74     LDL CHOLESTROL (mg/dL)   Date Value   03/10/2014 59   01/09/2013 92   09/07/2012 77     AST (U/L)   Date Value   04/06/2021 20   08/13/2020 12 (L)   12/23/2019 15   0 (ALCOHOL PREP) Does not apply Pads Use daily with victoza 90 each 3   • Glucose Blood In Vitro Strip Test twice daily 100 each 6   • FAMOTIDINE OR Take 1 tablet by mouth daily as needed.        • gabapentin 300 MG Oral Cap Take 1 capsule (300 mg total) by m breath with exertion  CARDIOVASCULAR: denies chest pain on exertion  GI: denies abdominal pain and denies heartburn  NEURO: denies headaches    EXAM:   /80 (BP Location: Left arm, Patient Position: Sitting)   Pulse 74   Temp 98 °F (36.7 °C) (Oral)

## 2021-10-20 DIAGNOSIS — E11.9 TYPE 2 DIABETES MELLITUS WITHOUT COMPLICATION, WITHOUT LONG-TERM CURRENT USE OF INSULIN (HCC): Primary | ICD-10-CM

## 2021-10-20 DIAGNOSIS — E78.00 ELEVATED LDL CHOLESTEROL LEVEL: ICD-10-CM

## 2021-10-26 RX ORDER — ATORVASTATIN CALCIUM 10 MG/1
10 TABLET, FILM COATED ORAL NIGHTLY
Qty: 90 TABLET | Refills: 0 | Status: SHIPPED | OUTPATIENT
Start: 2021-10-26 | End: 2022-01-14

## 2021-10-26 RX ORDER — GLIMEPIRIDE 2 MG/1
4 TABLET ORAL 2 TIMES DAILY
COMMUNITY
Start: 2021-10-26

## 2021-10-31 PROBLEM — E78.5 DYSLIPIDEMIA ASSOCIATED WITH TYPE 2 DIABETES MELLITUS (HCC): Status: ACTIVE | Noted: 2021-10-31

## 2021-10-31 PROBLEM — E11.69 DYSLIPIDEMIA ASSOCIATED WITH TYPE 2 DIABETES MELLITUS  (HCC): Status: ACTIVE | Noted: 2021-10-31

## 2021-10-31 PROBLEM — E78.5 DYSLIPIDEMIA ASSOCIATED WITH TYPE 2 DIABETES MELLITUS: Status: ACTIVE | Noted: 2021-10-31

## 2021-10-31 PROBLEM — E78.5 DYSLIPIDEMIA ASSOCIATED WITH TYPE 2 DIABETES MELLITUS  (HCC): Status: ACTIVE | Noted: 2021-10-31

## 2021-10-31 PROBLEM — E11.69 DYSLIPIDEMIA ASSOCIATED WITH TYPE 2 DIABETES MELLITUS: Status: ACTIVE | Noted: 2021-10-31

## 2021-10-31 PROBLEM — E11.69 DYSLIPIDEMIA ASSOCIATED WITH TYPE 2 DIABETES MELLITUS (HCC): Status: ACTIVE | Noted: 2021-10-31

## 2021-10-31 PROBLEM — E11.65 TYPE 2 DIABETES MELLITUS WITH HYPERGLYCEMIA, WITHOUT LONG-TERM CURRENT USE OF INSULIN (HCC): Status: ACTIVE | Noted: 2021-10-31

## 2021-11-15 ENCOUNTER — PATIENT MESSAGE (OUTPATIENT)
Dept: FAMILY MEDICINE CLINIC | Facility: CLINIC | Age: 61
End: 2021-11-15

## 2021-11-15 DIAGNOSIS — E11.40 TYPE 2 DIABETES MELLITUS WITH DIABETIC NEUROPATHY, WITHOUT LONG-TERM CURRENT USE OF INSULIN (HCC): ICD-10-CM

## 2021-11-15 DIAGNOSIS — E11.42 DIABETIC POLYNEUROPATHY ASSOCIATED WITH TYPE 2 DIABETES MELLITUS (HCC): ICD-10-CM

## 2021-11-16 NOTE — TELEPHONE ENCOUNTER
LOV: 10/19/21  for: DM  Patient advised to RTC on:  3 months  Previous Rx:  Metformin 1000mgà Sig: Take 1 tablet by mouth 2 times daily with meals. Disp #180/0 refills.   LF: 8/24/21
no

## 2021-11-22 RX ORDER — TRAMADOL HYDROCHLORIDE 50 MG/1
50 TABLET ORAL EVERY 12 HOURS PRN
Qty: 40 TABLET | Refills: 0 | Status: SHIPPED | OUTPATIENT
Start: 2021-11-22 | End: 2022-02-21

## 2021-11-22 NOTE — TELEPHONE ENCOUNTER
Medication:    Disp Refills Start End    traMADol 50 MG Oral Tab 40 tablet 0 7/27/2021     Sig - Route: Take 1 tablet (50 mg total) by mouth every 12 (twelve) hours as needed for Pain. - Oral    Sent to pharmacy as: traMADol HCl 50 MG Oral Tablet Caron Wayne      Date of last refill: 7/27/21  [place] 9/98[place]68    Last office visit: 9/7/21  Due back to clinic per last office note:  prn  Date next office visit scheduled:  Currently, no future OV scheduled at this time.      Last OV note recommendation: per Dr. Fawad Rangel on 9/7/21:    \"ASSESSMENT and PLAN:  42-year-old gentleman status post fragmentectomy  He is doing very well  His restrictions are lifted  He has some continued anterior thigh tightness, this was present prior to surgery  He is working with physical therapy  He will call with any questions or concerns  Patient very appreciative\"     Routed to providers as refill request.

## 2022-01-14 ENCOUNTER — LAB ENCOUNTER (OUTPATIENT)
Dept: LAB | Age: 62
End: 2022-01-14
Attending: FAMILY MEDICINE
Payer: COMMERCIAL

## 2022-01-14 DIAGNOSIS — E78.00 ELEVATED LDL CHOLESTEROL LEVEL: ICD-10-CM

## 2022-01-14 DIAGNOSIS — E11.9 TYPE 2 DIABETES MELLITUS WITHOUT COMPLICATION, WITHOUT LONG-TERM CURRENT USE OF INSULIN (HCC): ICD-10-CM

## 2022-01-14 LAB
ALBUMIN SERPL-MCNC: 4.1 G/DL (ref 3.4–5)
ALBUMIN/GLOB SERPL: 1.1 {RATIO} (ref 1–2)
ALP LIVER SERPL-CCNC: 83 U/L
ALT SERPL-CCNC: 29 U/L
ANION GAP SERPL CALC-SCNC: 8 MMOL/L (ref 0–18)
AST SERPL-CCNC: 15 U/L (ref 15–37)
BILIRUB SERPL-MCNC: 0.6 MG/DL (ref 0.1–2)
BUN BLD-MCNC: 22 MG/DL (ref 7–18)
CALCIUM BLD-MCNC: 10 MG/DL (ref 8.5–10.1)
CHLORIDE SERPL-SCNC: 103 MMOL/L (ref 98–112)
CHOLEST SERPL-MCNC: 130 MG/DL (ref ?–200)
CO2 SERPL-SCNC: 26 MMOL/L (ref 21–32)
CREAT BLD-MCNC: 1.28 MG/DL
EST. AVERAGE GLUCOSE BLD GHB EST-MCNC: 180 MG/DL (ref 68–126)
FASTING PATIENT LIPID ANSWER: YES
FASTING STATUS PATIENT QL REPORTED: YES
GLOBULIN PLAS-MCNC: 3.8 G/DL (ref 2.8–4.4)
GLUCOSE BLD-MCNC: 141 MG/DL (ref 70–99)
HBA1C MFR BLD: 7.9 % (ref ?–5.7)
HDLC SERPL-MCNC: 53 MG/DL (ref 40–59)
LDLC SERPL CALC-MCNC: 58 MG/DL (ref ?–100)
NONHDLC SERPL-MCNC: 77 MG/DL (ref ?–130)
OSMOLALITY SERPL CALC.SUM OF ELEC: 290 MOSM/KG (ref 275–295)
POTASSIUM SERPL-SCNC: 4.7 MMOL/L (ref 3.5–5.1)
PROT SERPL-MCNC: 7.9 G/DL (ref 6.4–8.2)
SODIUM SERPL-SCNC: 137 MMOL/L (ref 136–145)
TRIGL SERPL-MCNC: 105 MG/DL (ref 30–149)
VLDLC SERPL CALC-MCNC: 15 MG/DL (ref 0–30)

## 2022-01-14 PROCEDURE — 80061 LIPID PANEL: CPT

## 2022-01-14 PROCEDURE — 80053 COMPREHEN METABOLIC PANEL: CPT

## 2022-01-14 PROCEDURE — 36415 COLL VENOUS BLD VENIPUNCTURE: CPT

## 2022-01-14 PROCEDURE — 3051F HG A1C>EQUAL 7.0%<8.0%: CPT | Performed by: FAMILY MEDICINE

## 2022-01-14 PROCEDURE — 83036 HEMOGLOBIN GLYCOSYLATED A1C: CPT

## 2022-01-14 RX ORDER — ATORVASTATIN CALCIUM 10 MG/1
TABLET, FILM COATED ORAL
Qty: 30 TABLET | Refills: 0 | Status: SHIPPED | OUTPATIENT
Start: 2022-01-14

## 2022-02-03 RX ORDER — LOSARTAN POTASSIUM 100 MG/1
100 TABLET ORAL DAILY
Qty: 90 TABLET | Refills: 0 | Status: SHIPPED | OUTPATIENT
Start: 2022-02-03

## 2022-02-21 RX ORDER — ATORVASTATIN CALCIUM 10 MG/1
TABLET, FILM COATED ORAL
Qty: 30 TABLET | Refills: 0 | Status: SHIPPED | OUTPATIENT
Start: 2022-02-21 | End: 2022-03-25

## 2022-02-21 RX ORDER — TRAMADOL HYDROCHLORIDE 50 MG/1
TABLET ORAL
Qty: 40 TABLET | Refills: 0 | Status: SHIPPED | OUTPATIENT
Start: 2022-02-21

## 2022-02-21 NOTE — TELEPHONE ENCOUNTER
Medication: Tramadol     Date of last refill: 11/22/21 (#40/0)  [place]       Last office visit: 9/7/21  Due back to clinic per last office note:     Date next office visit scheduled:    Future Appointments   Date Time Provider Po Rowan   3/29/2022  3:00 PM Goran Garcia MD  [facility] note recommendation:    ASSESSMENT and PLAN:  68-year-old gentleman status post fragmentectomy  He is doing very well  His restrictions are lifted  He has some continued anterior thigh tightness, this was present prior to surgery  He is working with physical therapy  He will call with any questions or concerns

## 2022-02-21 NOTE — TELEPHONE ENCOUNTER
I can refill this time but if he wants another refill in the future he would either need to make an appointment to see us or instead have his PCP refill his meds.

## 2022-03-25 RX ORDER — ATORVASTATIN CALCIUM 10 MG/1
TABLET, FILM COATED ORAL
Qty: 90 TABLET | Refills: 0 | Status: SHIPPED | OUTPATIENT
Start: 2022-03-25

## 2022-03-29 ENCOUNTER — OFFICE VISIT (OUTPATIENT)
Dept: FAMILY MEDICINE CLINIC | Facility: CLINIC | Age: 62
End: 2022-03-29
Payer: COMMERCIAL

## 2022-03-29 VITALS
HEIGHT: 76 IN | BODY MASS INDEX: 33.49 KG/M2 | HEART RATE: 84 BPM | SYSTOLIC BLOOD PRESSURE: 138 MMHG | RESPIRATION RATE: 16 BRPM | TEMPERATURE: 99 F | WEIGHT: 275 LBS | DIASTOLIC BLOOD PRESSURE: 60 MMHG

## 2022-03-29 DIAGNOSIS — Z99.89 OSA ON CPAP: ICD-10-CM

## 2022-03-29 DIAGNOSIS — M54.16 LUMBAR RADICULOPATHY: ICD-10-CM

## 2022-03-29 DIAGNOSIS — E11.40 TYPE 2 DIABETES MELLITUS WITH DIABETIC NEUROPATHY, WITHOUT LONG-TERM CURRENT USE OF INSULIN (HCC): Primary | ICD-10-CM

## 2022-03-29 DIAGNOSIS — Z12.5 SPECIAL SCREENING FOR MALIGNANT NEOPLASM OF PROSTATE: ICD-10-CM

## 2022-03-29 DIAGNOSIS — E11.59 HYPERTENSION ASSOCIATED WITH DIABETES (HCC): ICD-10-CM

## 2022-03-29 DIAGNOSIS — M54.42 CHRONIC LEFT-SIDED LOW BACK PAIN WITH LEFT-SIDED SCIATICA: ICD-10-CM

## 2022-03-29 DIAGNOSIS — G89.29 CHRONIC LEFT-SIDED LOW BACK PAIN WITH LEFT-SIDED SCIATICA: ICD-10-CM

## 2022-03-29 DIAGNOSIS — M14.672 CHARCOT'S JOINT OF LEFT FOOT: ICD-10-CM

## 2022-03-29 DIAGNOSIS — E11.42 DIABETIC POLYNEUROPATHY ASSOCIATED WITH TYPE 2 DIABETES MELLITUS (HCC): ICD-10-CM

## 2022-03-29 DIAGNOSIS — I15.2 HYPERTENSION ASSOCIATED WITH DIABETES (HCC): ICD-10-CM

## 2022-03-29 DIAGNOSIS — G47.33 OSA ON CPAP: ICD-10-CM

## 2022-03-29 PROCEDURE — 3008F BODY MASS INDEX DOCD: CPT | Performed by: FAMILY MEDICINE

## 2022-03-29 PROCEDURE — 99214 OFFICE O/P EST MOD 30 MIN: CPT | Performed by: FAMILY MEDICINE

## 2022-03-29 PROCEDURE — 3078F DIAST BP <80 MM HG: CPT | Performed by: FAMILY MEDICINE

## 2022-03-29 PROCEDURE — 3075F SYST BP GE 130 - 139MM HG: CPT | Performed by: FAMILY MEDICINE

## 2022-03-29 RX ORDER — BLOOD SUGAR DIAGNOSTIC
STRIP MISCELLANEOUS
Qty: 200 EACH | Refills: 3 | Status: SHIPPED | OUTPATIENT
Start: 2022-03-29

## 2022-03-29 RX ORDER — EMPAGLIFLOZIN 25 MG/1
TABLET, FILM COATED ORAL
COMMUNITY

## 2022-03-29 RX ORDER — ASPIRIN 81 MG/1
81 TABLET ORAL DAILY
COMMUNITY

## 2022-03-29 RX ORDER — GABAPENTIN 300 MG/1
300 CAPSULE ORAL 2 TIMES DAILY
COMMUNITY
Start: 2022-02-22

## 2022-04-21 RX ORDER — LOSARTAN POTASSIUM 100 MG/1
TABLET ORAL
Qty: 90 TABLET | Refills: 0 | Status: SHIPPED | OUTPATIENT
Start: 2022-04-21

## 2022-05-08 ENCOUNTER — PATIENT MESSAGE (OUTPATIENT)
Dept: FAMILY MEDICINE CLINIC | Facility: CLINIC | Age: 62
End: 2022-05-08

## 2022-05-09 ENCOUNTER — PATIENT MESSAGE (OUTPATIENT)
Dept: FAMILY MEDICINE CLINIC | Facility: CLINIC | Age: 62
End: 2022-05-09

## 2022-05-09 RX ORDER — GABAPENTIN 300 MG/1
300 CAPSULE ORAL 2 TIMES DAILY
Qty: 180 CAPSULE | Refills: 0 | Status: SHIPPED | OUTPATIENT
Start: 2022-05-09

## 2022-05-09 NOTE — TELEPHONE ENCOUNTER
From: Alex Laura. To: MICHAEL Robbins  Sent: 5/9/2022 3:25 PM CDT  Subject: Sleep Study    Nathan Ybarra had ordered a sleep study for me, I went to set up the appointment and the [address] [place] can't find any info on the referral. They asked if you would send them the referral and office notes. Please advise. Thank you.

## 2022-05-09 NOTE — TELEPHONE ENCOUNTER
LOV 03/29/2022  NOV is on 07/12/2022  Last labs were 01/14/2022  Pt would like PCP to start refilling gabapentin. I pended the refill of you are in agreement to start refilling ekta for pt.

## 2022-05-09 NOTE — TELEPHONE ENCOUNTER
From: Cuong Ash. To: Hanna Wong MD  Sent: 5/8/2022 9:57 AM CDT  Subject: Prescription refill, Gabapentin 300 MG Caps    I reached out to Dr. Loly Rasmussen about getting a refill on the above. I got this reply: \"Hi [address], We have not received a refill request for Gabapentin for you. Since we have not seen you in our office since Sept. 2021 you will need to either make an appointment to be seen to discuss medication or you can request this from your PCP since it is for your diabetic neuropathy it may be more appropriate for you to request from your Primary care provider. \"     I do have an appointment with Dr. Loly Rasmussen on 7-2-22. The issue is I have run out of the meds in the meantime. If this is something you can assist with that would be great. I use the [facility] on [address] [facility] in [place]. Thank you.

## 2022-05-17 ENCOUNTER — PATIENT MESSAGE (OUTPATIENT)
Dept: FAMILY MEDICINE CLINIC | Facility: CLINIC | Age: 62
End: 2022-05-17

## 2022-05-17 DIAGNOSIS — G47.33 OSA ON CPAP: Primary | ICD-10-CM

## 2022-05-17 DIAGNOSIS — Z99.89 OSA ON CPAP: Primary | ICD-10-CM

## 2022-05-17 NOTE — TELEPHONE ENCOUNTER
From: Jimmie Mccormick. To: MICHAEL De Jesus  Sent: 5/9/2022 3:25 PM CDT  Subject: Sleep Study    Rama Drake had ordered a sleep study for me, I went to set up the appointment and the [address] over [address] [place] can't find any info on the referral. They asked if you would send them the referral and office notes. Please advise. Thank you.

## 2022-05-19 ENCOUNTER — ORDER TRANSCRIPTION (OUTPATIENT)
Dept: SLEEP CENTER | Age: 62
End: 2022-05-19

## 2022-05-19 DIAGNOSIS — Z11.59 SCREENING FOR VIRAL DISEASE: ICD-10-CM

## 2022-05-19 DIAGNOSIS — Z01.818 PREOP EXAMINATION: Primary | ICD-10-CM

## 2022-05-24 DIAGNOSIS — E11.42 DIABETIC POLYNEUROPATHY ASSOCIATED WITH TYPE 2 DIABETES MELLITUS (HCC): ICD-10-CM

## 2022-05-24 DIAGNOSIS — E11.40 TYPE 2 DIABETES MELLITUS WITH DIABETIC NEUROPATHY, WITHOUT LONG-TERM CURRENT USE OF INSULIN (HCC): ICD-10-CM

## 2022-06-02 ENCOUNTER — OFFICE VISIT (OUTPATIENT)
Dept: SURGERY | Facility: CLINIC | Age: 62
End: 2022-06-02
Payer: COMMERCIAL

## 2022-06-02 VITALS
BODY MASS INDEX: 33 KG/M2 | SYSTOLIC BLOOD PRESSURE: 148 MMHG | DIASTOLIC BLOOD PRESSURE: 62 MMHG | OXYGEN SATURATION: 98 % | WEIGHT: 275 LBS | HEART RATE: 84 BPM

## 2022-06-02 DIAGNOSIS — M54.16 LUMBAR RADICULOPATHY, ACUTE: ICD-10-CM

## 2022-06-02 PROCEDURE — 3077F SYST BP >= 140 MM HG: CPT | Performed by: NEUROLOGICAL SURGERY

## 2022-06-02 PROCEDURE — 99213 OFFICE O/P EST LOW 20 MIN: CPT | Performed by: NEUROLOGICAL SURGERY

## 2022-06-02 PROCEDURE — 3078F DIAST BP <80 MM HG: CPT | Performed by: NEUROLOGICAL SURGERY

## 2022-06-02 RX ORDER — CYCLOBENZAPRINE HCL 10 MG
10 TABLET ORAL 3 TIMES DAILY PRN
Qty: 60 TABLET | Refills: 1 | Status: SHIPPED | OUTPATIENT
Start: 2022-06-02

## 2022-06-02 RX ORDER — TRAMADOL HYDROCHLORIDE 50 MG/1
50 TABLET ORAL EVERY 12 HOURS PRN
Qty: 40 TABLET | Refills: 0 | Status: SHIPPED | OUTPATIENT
Start: 2022-06-02

## 2022-06-02 NOTE — PROGRESS NOTES
Patient states that since surgery his left thigh muscle is constantly contracted and cramped, and left knee gives away. He also c/o right sided back pain.

## 2022-06-12 ENCOUNTER — PATIENT MESSAGE (OUTPATIENT)
Dept: FAMILY MEDICINE CLINIC | Facility: CLINIC | Age: 62
End: 2022-06-12

## 2022-06-13 ENCOUNTER — HOSPITAL ENCOUNTER (OUTPATIENT)
Age: 62
Discharge: HOME | End: 2022-06-13
Payer: COMMERCIAL

## 2022-06-13 VITALS
OXYGEN SATURATION: 95 % | DIASTOLIC BLOOD PRESSURE: 48 MMHG | SYSTOLIC BLOOD PRESSURE: 129 MMHG | TEMPERATURE: 98 F | HEART RATE: 95 BPM | RESPIRATION RATE: 20 BRPM

## 2022-06-13 DIAGNOSIS — U07.1 COVID-19: Primary | ICD-10-CM

## 2022-06-13 LAB — SARS-COV-2 RNA RESP QL NAA+PROBE: DETECTED

## 2022-06-13 PROCEDURE — 99213 OFFICE O/P EST LOW 20 MIN: CPT

## 2022-06-13 RX ORDER — NIRMATRELVIR AND RITONAVIR 300-100 MG
KIT ORAL
Qty: 30 TABLET | Refills: 0 | Status: SHIPPED | OUTPATIENT
Start: 2022-06-13 | End: 2022-06-18

## 2022-06-13 RX ORDER — BEBTELOVIMAB 87.5 MG/ML
175 INJECTION, SOLUTION INTRAVENOUS ONCE
Status: DISCONTINUED | OUTPATIENT
Start: 2022-06-13 | End: 2022-06-13

## 2022-06-14 ENCOUNTER — TELEPHONE (OUTPATIENT)
Dept: FAMILY MEDICINE CLINIC | Facility: CLINIC | Age: 62
End: 2022-06-14

## 2022-06-15 NOTE — TELEPHONE ENCOUNTER
S/w pt    Feels better than yesterday. sts he felt like he had the flu. Said lungs are clear, sinuses feel plugged up. No new or worse sx's. Reports tired, a little achey \"nothing scary\"  No cp or sob. No new or worse sxs  Currently on paxlovid. Please advise if we should continue outreach?

## 2022-06-28 RX ORDER — ATORVASTATIN CALCIUM 10 MG/1
TABLET, FILM COATED ORAL
Qty: 30 TABLET | Refills: 0 | Status: SHIPPED | OUTPATIENT
Start: 2022-06-28

## 2022-07-08 ENCOUNTER — LAB ENCOUNTER (OUTPATIENT)
Dept: LAB | Age: 62
End: 2022-07-08
Attending: FAMILY MEDICINE
Payer: COMMERCIAL

## 2022-07-08 DIAGNOSIS — E11.59 HYPERTENSION ASSOCIATED WITH DIABETES (HCC): ICD-10-CM

## 2022-07-08 DIAGNOSIS — E11.40 TYPE 2 DIABETES MELLITUS WITH DIABETIC NEUROPATHY, WITHOUT LONG-TERM CURRENT USE OF INSULIN (HCC): ICD-10-CM

## 2022-07-08 DIAGNOSIS — Z12.5 SPECIAL SCREENING FOR MALIGNANT NEOPLASM OF PROSTATE: ICD-10-CM

## 2022-07-08 DIAGNOSIS — M54.16 LUMBAR RADICULOPATHY: ICD-10-CM

## 2022-07-08 DIAGNOSIS — G89.29 CHRONIC LEFT-SIDED LOW BACK PAIN WITH LEFT-SIDED SCIATICA: ICD-10-CM

## 2022-07-08 DIAGNOSIS — M54.42 CHRONIC LEFT-SIDED LOW BACK PAIN WITH LEFT-SIDED SCIATICA: ICD-10-CM

## 2022-07-08 DIAGNOSIS — E11.42 DIABETIC POLYNEUROPATHY ASSOCIATED WITH TYPE 2 DIABETES MELLITUS (HCC): ICD-10-CM

## 2022-07-08 DIAGNOSIS — I15.2 HYPERTENSION ASSOCIATED WITH DIABETES (HCC): ICD-10-CM

## 2022-07-08 DIAGNOSIS — M14.672 CHARCOT'S JOINT OF LEFT FOOT: ICD-10-CM

## 2022-07-08 LAB
ALBUMIN SERPL-MCNC: 3.8 G/DL (ref 3.4–5)
ALBUMIN/GLOB SERPL: 1 {RATIO} (ref 1–2)
ALP LIVER SERPL-CCNC: 77 U/L
ALT SERPL-CCNC: 26 U/L
ANION GAP SERPL CALC-SCNC: 4 MMOL/L (ref 0–18)
AST SERPL-CCNC: 19 U/L (ref 15–37)
BASOPHILS # BLD AUTO: 0.04 X10(3) UL (ref 0–0.2)
BASOPHILS NFR BLD AUTO: 0.5 %
BILIRUB SERPL-MCNC: 0.3 MG/DL (ref 0.1–2)
BUN BLD-MCNC: 23 MG/DL (ref 7–18)
CALCIUM BLD-MCNC: 9 MG/DL (ref 8.5–10.1)
CHLORIDE SERPL-SCNC: 108 MMOL/L (ref 98–112)
CHOLEST SERPL-MCNC: 125 MG/DL (ref ?–200)
CO2 SERPL-SCNC: 26 MMOL/L (ref 21–32)
CREAT BLD-MCNC: 1.18 MG/DL
CREAT UR-SCNC: 106 MG/DL
EOSINOPHIL # BLD AUTO: 0.27 X10(3) UL (ref 0–0.7)
EOSINOPHIL NFR BLD AUTO: 3.4 %
ERYTHROCYTE [DISTWIDTH] IN BLOOD BY AUTOMATED COUNT: 13.4 %
EST. AVERAGE GLUCOSE BLD GHB EST-MCNC: 174 MG/DL (ref 68–126)
FASTING PATIENT LIPID ANSWER: YES
FASTING STATUS PATIENT QL REPORTED: YES
GLOBULIN PLAS-MCNC: 3.7 G/DL (ref 2.8–4.4)
GLUCOSE BLD-MCNC: 120 MG/DL (ref 70–99)
HBA1C MFR BLD: 7.7 % (ref ?–5.7)
HCT VFR BLD AUTO: 42.7 %
HDLC SERPL-MCNC: 43 MG/DL (ref 40–59)
HGB BLD-MCNC: 13.9 G/DL
IMM GRANULOCYTES # BLD AUTO: 0.03 X10(3) UL (ref 0–1)
IMM GRANULOCYTES NFR BLD: 0.4 %
LDLC SERPL CALC-MCNC: 55 MG/DL (ref ?–100)
LYMPHOCYTES # BLD AUTO: 1.73 X10(3) UL (ref 1–4)
LYMPHOCYTES NFR BLD AUTO: 21.9 %
MCH RBC QN AUTO: 29.6 PG (ref 26–34)
MCHC RBC AUTO-ENTMCNC: 32.6 G/DL (ref 31–37)
MCV RBC AUTO: 90.9 FL
MICROALBUMIN UR-MCNC: 0.73 MG/DL
MICROALBUMIN/CREAT 24H UR-RTO: 6.9 UG/MG (ref ?–30)
MONOCYTES # BLD AUTO: 0.66 X10(3) UL (ref 0.1–1)
MONOCYTES NFR BLD AUTO: 8.4 %
NEUTROPHILS # BLD AUTO: 5.16 X10 (3) UL (ref 1.5–7.7)
NEUTROPHILS # BLD AUTO: 5.16 X10(3) UL (ref 1.5–7.7)
NEUTROPHILS NFR BLD AUTO: 65.4 %
NONHDLC SERPL-MCNC: 82 MG/DL (ref ?–130)
OSMOLALITY SERPL CALC.SUM OF ELEC: 291 MOSM/KG (ref 275–295)
PLATELET # BLD AUTO: 244 10(3)UL (ref 150–450)
POTASSIUM SERPL-SCNC: 4.1 MMOL/L (ref 3.5–5.1)
PROT SERPL-MCNC: 7.5 G/DL (ref 6.4–8.2)
PSA SERPL-MCNC: 1.49 NG/ML (ref ?–4)
RBC # BLD AUTO: 4.7 X10(6)UL
SODIUM SERPL-SCNC: 138 MMOL/L (ref 136–145)
TRIGL SERPL-MCNC: 157 MG/DL (ref 30–149)
VLDLC SERPL CALC-MCNC: 23 MG/DL (ref 0–30)
WBC # BLD AUTO: 7.9 X10(3) UL (ref 4–11)

## 2022-07-08 PROCEDURE — 82570 ASSAY OF URINE CREATININE: CPT

## 2022-07-08 PROCEDURE — 82043 UR ALBUMIN QUANTITATIVE: CPT

## 2022-07-08 PROCEDURE — 80061 LIPID PANEL: CPT

## 2022-07-08 PROCEDURE — 84153 ASSAY OF PSA TOTAL: CPT

## 2022-07-08 PROCEDURE — 85025 COMPLETE CBC W/AUTO DIFF WBC: CPT

## 2022-07-08 PROCEDURE — 83036 HEMOGLOBIN GLYCOSYLATED A1C: CPT

## 2022-07-08 PROCEDURE — 3051F HG A1C>EQUAL 7.0%<8.0%: CPT | Performed by: FAMILY MEDICINE

## 2022-07-08 PROCEDURE — 80053 COMPREHEN METABOLIC PANEL: CPT

## 2022-07-08 PROCEDURE — 3061F NEG MICROALBUMINURIA REV: CPT | Performed by: FAMILY MEDICINE

## 2022-07-11 ENCOUNTER — PATIENT OUTREACH (OUTPATIENT)
Dept: FAMILY MEDICINE CLINIC | Facility: CLINIC | Age: 62
End: 2022-07-11

## 2022-07-11 PROBLEM — I10 ESSENTIAL (PRIMARY) HYPERTENSION: Status: ACTIVE | Noted: 2021-04-12

## 2022-07-11 PROBLEM — G47.33 OBSTRUCTIVE SLEEP APNEA (ADULT) (PEDIATRIC): Status: ACTIVE | Noted: 2021-04-12

## 2022-07-11 PROBLEM — Z91.81 HISTORY OF FALLING: Status: ACTIVE | Noted: 2021-04-12

## 2022-07-11 PROBLEM — Z79.84 LONG TERM (CURRENT) USE OF ORAL HYPOGLYCEMIC DRUGS: Status: ACTIVE | Noted: 2021-04-12

## 2022-07-11 PROBLEM — E78.5 HYPERLIPIDEMIA, UNSPECIFIED: Status: ACTIVE | Noted: 2021-04-12

## 2022-07-11 PROBLEM — H91.90 UNSPECIFIED HEARING LOSS, UNSPECIFIED EAR: Status: ACTIVE | Noted: 2021-04-12

## 2022-07-11 PROBLEM — L97.511: Status: ACTIVE | Noted: 2021-04-12

## 2022-07-11 PROBLEM — M54.16 RADICULOPATHY, LUMBAR REGION: Status: ACTIVE | Noted: 2021-04-12

## 2022-07-11 PROBLEM — E11.621 TYPE 2 DIABETES MELLITUS WITH FOOT ULCER (CODE) (HCC): Status: ACTIVE | Noted: 2021-04-12

## 2022-07-11 PROBLEM — Z87.891 PERSONAL HISTORY OF NICOTINE DEPENDENCE: Status: ACTIVE | Noted: 2021-04-12

## 2022-07-11 PROBLEM — E11.42 TYPE 2 DIABETES MELLITUS WITH DIABETIC POLYNEUROPATHY (HCC): Status: ACTIVE | Noted: 2021-04-12

## 2022-07-12 ENCOUNTER — OFFICE VISIT (OUTPATIENT)
Dept: FAMILY MEDICINE CLINIC | Facility: CLINIC | Age: 62
End: 2022-07-12
Payer: COMMERCIAL

## 2022-07-12 VITALS
HEIGHT: 76 IN | SYSTOLIC BLOOD PRESSURE: 124 MMHG | TEMPERATURE: 97 F | WEIGHT: 273 LBS | HEART RATE: 76 BPM | DIASTOLIC BLOOD PRESSURE: 60 MMHG | OXYGEN SATURATION: 97 % | RESPIRATION RATE: 16 BRPM | BODY MASS INDEX: 33.24 KG/M2

## 2022-07-12 DIAGNOSIS — I15.2 HYPERTENSION ASSOCIATED WITH DIABETES (HCC): ICD-10-CM

## 2022-07-12 DIAGNOSIS — G89.29 CHRONIC LEFT-SIDED LOW BACK PAIN WITHOUT SCIATICA: ICD-10-CM

## 2022-07-12 DIAGNOSIS — E78.00 PURE HYPERCHOLESTEROLEMIA: ICD-10-CM

## 2022-07-12 DIAGNOSIS — M14.672 CHARCOT'S JOINT OF LEFT FOOT: ICD-10-CM

## 2022-07-12 DIAGNOSIS — E11.42 DIABETIC POLYNEUROPATHY ASSOCIATED WITH TYPE 2 DIABETES MELLITUS (HCC): ICD-10-CM

## 2022-07-12 DIAGNOSIS — Z99.89 OSA ON CPAP: ICD-10-CM

## 2022-07-12 DIAGNOSIS — M54.50 CHRONIC LEFT-SIDED LOW BACK PAIN WITHOUT SCIATICA: ICD-10-CM

## 2022-07-12 DIAGNOSIS — Z00.00 ANNUAL PHYSICAL EXAM: Primary | ICD-10-CM

## 2022-07-12 DIAGNOSIS — E11.59 HYPERTENSION ASSOCIATED WITH DIABETES (HCC): ICD-10-CM

## 2022-07-12 DIAGNOSIS — G47.33 OSA ON CPAP: ICD-10-CM

## 2022-07-12 DIAGNOSIS — E11.40 TYPE 2 DIABETES MELLITUS WITH DIABETIC NEUROPATHY, WITHOUT LONG-TERM CURRENT USE OF INSULIN (HCC): ICD-10-CM

## 2022-07-12 PROCEDURE — 3008F BODY MASS INDEX DOCD: CPT | Performed by: FAMILY MEDICINE

## 2022-07-12 PROCEDURE — 3078F DIAST BP <80 MM HG: CPT | Performed by: FAMILY MEDICINE

## 2022-07-12 PROCEDURE — 3074F SYST BP LT 130 MM HG: CPT | Performed by: FAMILY MEDICINE

## 2022-07-12 PROCEDURE — 99396 PREV VISIT EST AGE 40-64: CPT | Performed by: FAMILY MEDICINE

## 2022-07-14 ENCOUNTER — OFFICE VISIT (OUTPATIENT)
Dept: SLEEP CENTER | Age: 62
End: 2022-07-14
Attending: NURSE PRACTITIONER
Payer: COMMERCIAL

## 2022-07-14 DIAGNOSIS — Z99.89 OSA ON CPAP: ICD-10-CM

## 2022-07-14 DIAGNOSIS — G47.33 OSA ON CPAP: ICD-10-CM

## 2022-07-14 PROCEDURE — 95811 POLYSOM 6/>YRS CPAP 4/> PARM: CPT

## 2022-07-20 DIAGNOSIS — G47.33 OSA ON CPAP: Primary | ICD-10-CM

## 2022-07-20 DIAGNOSIS — Z99.89 OSA ON CPAP: Primary | ICD-10-CM

## 2022-07-26 DIAGNOSIS — I15.2 HYPERTENSION ASSOCIATED WITH DIABETES (HCC): ICD-10-CM

## 2022-07-26 DIAGNOSIS — E11.59 HYPERTENSION ASSOCIATED WITH DIABETES (HCC): ICD-10-CM

## 2022-07-26 DIAGNOSIS — E11.40 TYPE 2 DIABETES MELLITUS WITH DIABETIC NEUROPATHY, WITHOUT LONG-TERM CURRENT USE OF INSULIN (HCC): Primary | ICD-10-CM

## 2022-07-26 RX ORDER — LOSARTAN POTASSIUM 100 MG/1
TABLET ORAL
Qty: 90 TABLET | Refills: 0 | Status: SHIPPED | OUTPATIENT
Start: 2022-07-26

## 2022-07-26 RX ORDER — PEN NEEDLE, DIABETIC 32GX 5/32"
NEEDLE, DISPOSABLE MISCELLANEOUS
Qty: 100 EACH | Refills: 3 | Status: SHIPPED | OUTPATIENT
Start: 2022-07-26

## 2022-07-26 RX ORDER — GABAPENTIN 300 MG/1
CAPSULE ORAL
Qty: 180 CAPSULE | Refills: 0 | Status: SHIPPED | OUTPATIENT
Start: 2022-07-26

## 2022-07-26 RX ORDER — ATORVASTATIN CALCIUM 10 MG/1
TABLET, FILM COATED ORAL
Qty: 90 TABLET | Refills: 0 | Status: SHIPPED | OUTPATIENT
Start: 2022-07-26

## 2022-07-26 NOTE — TELEPHONE ENCOUNTER
Gabapentin 300 MG Oral Capsule    Please see pended medications. Please sign if appropriate.       Thank you        Last OV: 07/12/22      Last refill: 05/09/22 for 180 tabs      Pt asked to RTC in 3 months (10/12/22)

## 2022-08-05 ENCOUNTER — PATIENT MESSAGE (OUTPATIENT)
Dept: FAMILY MEDICINE CLINIC | Facility: CLINIC | Age: 62
End: 2022-08-05

## 2022-08-05 ENCOUNTER — TELEPHONE (OUTPATIENT)
Dept: FAMILY MEDICINE CLINIC | Facility: CLINIC | Age: 62
End: 2022-08-05

## 2022-08-05 DIAGNOSIS — E11.42 TYPE 2 DIABETES MELLITUS WITH POLYNEUROPATHY (HCC): Primary | ICD-10-CM

## 2022-08-11 ENCOUNTER — PROCEDURE VISIT (OUTPATIENT)
Dept: NEUROLOGY | Facility: CLINIC | Age: 62
End: 2022-08-11
Payer: COMMERCIAL

## 2022-08-11 DIAGNOSIS — M54.50 ACUTE LEFT-SIDED LOW BACK PAIN WITHOUT SCIATICA: ICD-10-CM

## 2022-08-11 DIAGNOSIS — E08.42 DIABETIC POLYNEUROPATHY ASSOCIATED WITH DIABETES MELLITUS DUE TO UNDERLYING CONDITION (HCC): ICD-10-CM

## 2022-08-11 PROCEDURE — 95886 MUSC TEST DONE W/N TEST COMP: CPT | Performed by: OTHER

## 2022-08-11 PROCEDURE — 95909 NRV CNDJ TST 5-6 STUDIES: CPT | Performed by: OTHER

## 2022-08-12 NOTE — TELEPHONE ENCOUNTER
From: Bree Sánchez  To: Hedy Otto [facility], [facility].. Sent: 8/5/2022 11:44 AM CDT  Subject: diabetic eye exam    Reba Rollins Sundar,    We are reaching out to you because you are over due for your diabetic eye exam. This needs to be done yearly when you are diabetic. We have placed a referral for you. Please call Dr. Jackie Bolivar office and schedule your appointment. [phone]. We need you to complete this by December 1,2022. If you have any questions please let us know.        Have a great weekend,  Mariya Castillo RN

## 2022-08-15 RX ORDER — LIRAGLUTIDE 6 MG/ML
0.6 INJECTION SUBCUTANEOUS DAILY
Qty: 3 EACH | Refills: 1 | Status: SHIPPED | OUTPATIENT
Start: 2022-08-15

## 2022-08-17 ENCOUNTER — TELEPHONE (OUTPATIENT)
Dept: FAMILY MEDICINE CLINIC | Facility: CLINIC | Age: 62
End: 2022-08-17

## 2022-08-24 ENCOUNTER — MED REC SCAN ONLY (OUTPATIENT)
Dept: FAMILY MEDICINE CLINIC | Facility: CLINIC | Age: 62
End: 2022-08-24

## 2022-08-25 ENCOUNTER — OFFICE VISIT (OUTPATIENT)
Dept: SURGERY | Facility: CLINIC | Age: 62
End: 2022-08-25
Payer: COMMERCIAL

## 2022-08-25 VITALS
SYSTOLIC BLOOD PRESSURE: 142 MMHG | DIASTOLIC BLOOD PRESSURE: 78 MMHG | HEIGHT: 76 IN | WEIGHT: 270 LBS | OXYGEN SATURATION: 98 % | BODY MASS INDEX: 32.88 KG/M2 | HEART RATE: 88 BPM

## 2022-08-25 DIAGNOSIS — R29.898 LEFT LEG WEAKNESS: Primary | ICD-10-CM

## 2022-08-25 DIAGNOSIS — M54.16 LUMBAR RADICULOPATHY: ICD-10-CM

## 2022-08-25 PROCEDURE — 3008F BODY MASS INDEX DOCD: CPT | Performed by: NEUROLOGICAL SURGERY

## 2022-08-25 PROCEDURE — 3078F DIAST BP <80 MM HG: CPT | Performed by: NEUROLOGICAL SURGERY

## 2022-08-25 PROCEDURE — 99212 OFFICE O/P EST SF 10 MIN: CPT | Performed by: NEUROLOGICAL SURGERY

## 2022-08-25 PROCEDURE — 3077F SYST BP >= 140 MM HG: CPT | Performed by: NEUROLOGICAL SURGERY

## 2022-08-29 DIAGNOSIS — E11.42 DIABETIC POLYNEUROPATHY ASSOCIATED WITH TYPE 2 DIABETES MELLITUS (HCC): ICD-10-CM

## 2022-08-29 DIAGNOSIS — E11.40 TYPE 2 DIABETES MELLITUS WITH DIABETIC NEUROPATHY, WITHOUT LONG-TERM CURRENT USE OF INSULIN (HCC): ICD-10-CM

## 2022-10-13 DIAGNOSIS — I15.2 HYPERTENSION ASSOCIATED WITH DIABETES (HCC): ICD-10-CM

## 2022-10-13 DIAGNOSIS — E11.59 HYPERTENSION ASSOCIATED WITH DIABETES (HCC): ICD-10-CM

## 2022-10-13 RX ORDER — LOSARTAN POTASSIUM 100 MG/1
100 TABLET ORAL DAILY
Qty: 90 TABLET | Refills: 0 | Status: SHIPPED | OUTPATIENT
Start: 2022-10-13

## 2022-10-13 NOTE — TELEPHONE ENCOUNTER
LOV: 07/12/2022    Last Refill:   LOSARTAN 100 MG Oral Tab 90 tablet 0 7/26/2022     RTC: 10/12/2022    Protocol: passed

## 2022-10-18 ENCOUNTER — TELEPHONE (OUTPATIENT)
Dept: FAMILY MEDICINE CLINIC | Facility: CLINIC | Age: 62
End: 2022-10-18

## 2022-10-18 ENCOUNTER — PATIENT MESSAGE (OUTPATIENT)
Dept: FAMILY MEDICINE CLINIC | Facility: CLINIC | Age: 62
End: 2022-10-18

## 2022-10-18 DIAGNOSIS — M54.16 LUMBAR RADICULOPATHY, ACUTE: ICD-10-CM

## 2022-10-18 NOTE — TELEPHONE ENCOUNTER
LOV 07/12/2022  NOV due 10/12//2022  [facility] sent to pt to make an appt for a follow up w/ Dr. Michael Deluna

## 2022-10-18 NOTE — TELEPHONE ENCOUNTER
Medication: Tramadol 50mg     Date of last refill: 6/2/22  (#40/0)  [place] 6/[place]/84     Last office visit: 8/25/22  Due back to clinic per last office note:  PRN  Date next office visit scheduled:    Future Appointments   Date Time Provider Po Rowan   10/24/2022  9:00 AM Blaine Cardona, DO [facility] [place]           Last OV note recommendation:    ASSESSMENT and PLAN:  18-year-old gentleman with left thigh tightness  Is no clear indication on his MRI or his EMG was causing this  He would like to try some physical therapy  We will see him back as needed  He will call me if he wants to follow-up  All questions were answered  Patient very patient

## 2022-10-19 RX ORDER — TRAMADOL HYDROCHLORIDE 50 MG/1
50 TABLET ORAL EVERY 12 HOURS PRN
Qty: 40 TABLET | Refills: 0 | Status: SHIPPED | OUTPATIENT
Start: 2022-10-19

## 2022-10-19 NOTE — TELEPHONE ENCOUNTER
[id number] [address] for one refill but needs follow up after PT to discuss if further pain medications are indicated

## 2022-10-24 ENCOUNTER — ORDER TRANSCRIPTION (OUTPATIENT)
Dept: SLEEP CENTER | Age: 62
End: 2022-10-24

## 2022-10-24 ENCOUNTER — OFFICE VISIT (OUTPATIENT)
Facility: CLINIC | Age: 62
End: 2022-10-24
Payer: COMMERCIAL

## 2022-10-24 ENCOUNTER — TELEPHONE (OUTPATIENT)
Facility: CLINIC | Age: 62
End: 2022-10-24

## 2022-10-24 VITALS
HEIGHT: 76 IN | WEIGHT: 263 LBS | SYSTOLIC BLOOD PRESSURE: 140 MMHG | BODY MASS INDEX: 32.03 KG/M2 | DIASTOLIC BLOOD PRESSURE: 70 MMHG | OXYGEN SATURATION: 98 % | RESPIRATION RATE: 16 BRPM | HEART RATE: 82 BPM

## 2022-10-24 DIAGNOSIS — G47.33 OSA (OBSTRUCTIVE SLEEP APNEA): Primary | ICD-10-CM

## 2022-10-24 DIAGNOSIS — G47.33 OBSTRUCTIVE SLEEP APNEA (ADULT) (PEDIATRIC): Primary | ICD-10-CM

## 2022-10-24 DIAGNOSIS — I10 ESSENTIAL HYPERTENSION: ICD-10-CM

## 2022-10-24 DIAGNOSIS — E66.09 CLASS 1 OBESITY DUE TO EXCESS CALORIES WITHOUT SERIOUS COMORBIDITY WITH BODY MASS INDEX (BMI) OF 32.0 TO 32.9 IN ADULT: ICD-10-CM

## 2022-10-24 DIAGNOSIS — G47.61 PERIODIC LIMB MOVEMENT DISORDER (PLMD): ICD-10-CM

## 2022-10-24 PROCEDURE — 3078F DIAST BP <80 MM HG: CPT | Performed by: OTHER

## 2022-10-24 PROCEDURE — 99204 OFFICE O/P NEW MOD 45 MIN: CPT | Performed by: OTHER

## 2022-10-24 PROCEDURE — 3008F BODY MASS INDEX DOCD: CPT | Performed by: OTHER

## 2022-10-24 PROCEDURE — 3077F SYST BP >= 140 MM HG: CPT | Performed by: OTHER

## 2022-10-24 RX ORDER — ATORVASTATIN CALCIUM 10 MG/1
TABLET, FILM COATED ORAL
Qty: 90 TABLET | Refills: 0 | OUTPATIENT
Start: 2022-10-24

## 2022-10-24 RX ORDER — GABAPENTIN 300 MG/1
CAPSULE ORAL
Qty: 180 CAPSULE | Refills: 0 | Status: SHIPPED | OUTPATIENT
Start: 2022-10-24

## 2022-10-24 RX ORDER — ATORVASTATIN CALCIUM 10 MG/1
10 TABLET, FILM COATED ORAL NIGHTLY
Qty: 90 TABLET | Refills: 0 | Status: SHIPPED | OUTPATIENT
Start: 2022-10-24

## 2022-10-24 NOTE — PATIENT INSTRUCTIONS
Please be advised:   Do not drive while sleepy   Take CPAP/BiPAP machine to any procedure that requires sedation     When should I clean my machine & supplies? Clean mask cushions or nasal pillow, headgear, tubing, and humidifier chamber with mild soap (Erin) and water   If water chamber has hard water buildup (white crust), soak in warm water & vinegar mix 50/50. Rinse and hang dry     DAILY   Wipe mask cushions or nasal pillow   Empty & rinse water chamber- refill with distilled water     WEEKLY   Clean mask cushions or nasal pillow, headgear, tubing, and humidifier chamber with mild soap (Erin) and water   If water chamber has hard water buildup (white crust), soak in warm water & vinegar mix 50/50. Rinse and hang dry     When should supplies be replaced? Contact your home care company for replacement supplies, or if your machine is malfunctioning   *Below is a general guideline of what we recommend. Replacement of supplies differs depending on your insurance company*   MONTHLY: Replace filter and mask cushion   6 MONTHS: Replace headgear and tubing     Travel Tips   Keep CPAP/BiPAP in original bag when traveling, and place luggage tag on bag   Most airlines consider CPAP/BiPAP to be a medical device, therefore it is a free carry-on item   If unable to get distilled water, bottled water is safe while traveling.  DO NOT use tap water   When traveling outside the [place] only a power adapter is necessary (CPAP can operate without a converter), bring an extension cord   Consider purchasing or renting a travel CPAP (not covered by insurance)     Dry Mouth/Nose   Turn up the humidity on your machine (select \"Options\" from the home screen)   Place a cool mist humidifier at your bedside   Over-the-counter remedies: Biotene, XyliMelts, NasoGel     Air Leak   Try adjusting your mask/headgear while laying in sleeping position vs. sitting up   Wash and dry your face prior to putting your mask on   If applicable: shave facial hair at night (or before wearing CPAP)   Purchase \"RemZzzs\" (through home care co., [employer], or remzzzs. com)   100% cotton knit barrier that goes between your mask cushion and your skin   Replace your mask cushion (at least once per month) and/or headgear (every 3-6 months)     Nasal Congestion   CPAP therapy can cause nasal passages to dry out, & mucous membranes try to protect the nasal passages by producing excess mucous, so congestion results. Over-the counter remedies: Flonase, Nasacort, Sinus Rinses (Neti-Pot), DO NOT USE Afrin   Try a mask that goes over the nose and mouth     Skin Irritation   Clean supplies regularly (Citrus II Mask Wipes, Control III disinfectant solution)   Try over the counter creams such as hydrocortisone 1% (apply in the morning after showering)   Your headgear may be too tight, replace supplies so you don't need to adjust so tightly   Try RemZzzs (100% cotton knit barrier that goes between your mask cushion and your skin)     Gas/Bloating   Try a different sleeping position to keep air out of the stomach. Lay on the left side or rotate to the right side. Incline with pillows or lay flat.    Over-the-counter remedies: Simethicone

## 2022-10-25 RX ORDER — ATORVASTATIN CALCIUM 10 MG/1
TABLET, FILM COATED ORAL
Qty: 90 TABLET | Refills: 0 | OUTPATIENT
Start: 2022-10-25

## 2022-11-08 RX ORDER — EMPAGLIFLOZIN 25 MG/1
25 TABLET, FILM COATED ORAL DAILY
Qty: 90 TABLET | Refills: 0 | Status: SHIPPED | OUTPATIENT
Start: 2022-11-08

## 2022-11-15 DIAGNOSIS — E11.42 DIABETIC POLYNEUROPATHY ASSOCIATED WITH TYPE 2 DIABETES MELLITUS (HCC): ICD-10-CM

## 2022-11-15 DIAGNOSIS — E11.40 TYPE 2 DIABETES MELLITUS WITH DIABETIC NEUROPATHY, WITHOUT LONG-TERM CURRENT USE OF INSULIN (HCC): ICD-10-CM

## 2022-11-22 ENCOUNTER — OFFICE VISIT (OUTPATIENT)
Dept: SLEEP CENTER | Age: 62
End: 2022-11-22
Attending: Other
Payer: COMMERCIAL

## 2022-11-22 DIAGNOSIS — G47.33 OBSTRUCTIVE SLEEP APNEA (ADULT) (PEDIATRIC): ICD-10-CM

## 2022-11-22 PROCEDURE — 95806 SLEEP STUDY UNATT&RESP EFFT: CPT

## 2022-11-28 ENCOUNTER — SLEEP STUDY (OUTPATIENT)
Facility: CLINIC | Age: 62
End: 2022-11-28
Payer: COMMERCIAL

## 2022-11-28 DIAGNOSIS — G47.33 OBSTRUCTIVE SLEEP APNEA SYNDROME: Primary | ICD-10-CM

## 2022-11-28 PROCEDURE — 95806 SLEEP STUDY UNATT&RESP EFFT: CPT | Performed by: OTHER

## 2022-12-07 ENCOUNTER — TELEPHONE (OUTPATIENT)
Facility: CLINIC | Age: 62
End: 2022-12-07

## 2022-12-07 DIAGNOSIS — G47.33 OSA (OBSTRUCTIVE SLEEP APNEA): Primary | ICD-10-CM

## 2022-12-07 NOTE — TELEPHONE ENCOUNTER
Notified of HST results- has severe JONY. Pt already completed cpap titration. Pt notified cpap machine ordered to [facility]. DME will verify insurance and once approved will contact pt to arrange delivery and instructions. Pt instructed to follow up with Dr. Jose Funes once pt starts PAP therapy per insurance compliance requirement. Pt verbalized understanding of instructions and agrees with the plan.

## 2022-12-28 ENCOUNTER — LAB ENCOUNTER (OUTPATIENT)
Dept: LAB | Age: 62
End: 2022-12-28
Attending: FAMILY MEDICINE
Payer: COMMERCIAL

## 2022-12-28 DIAGNOSIS — E11.40 TYPE 2 DIABETES MELLITUS WITH DIABETIC NEUROPATHY, WITHOUT LONG-TERM CURRENT USE OF INSULIN (HCC): ICD-10-CM

## 2022-12-28 DIAGNOSIS — I15.2 HYPERTENSION ASSOCIATED WITH DIABETES (HCC): ICD-10-CM

## 2022-12-28 DIAGNOSIS — E78.00 PURE HYPERCHOLESTEROLEMIA: ICD-10-CM

## 2022-12-28 DIAGNOSIS — E11.59 HYPERTENSION ASSOCIATED WITH DIABETES (HCC): ICD-10-CM

## 2022-12-28 DIAGNOSIS — E11.42 DIABETIC POLYNEUROPATHY ASSOCIATED WITH TYPE 2 DIABETES MELLITUS (HCC): ICD-10-CM

## 2022-12-28 LAB
ALBUMIN SERPL-MCNC: 3.8 G/DL (ref 3.4–5)
ALBUMIN/GLOB SERPL: 1 {RATIO} (ref 1–2)
ALP LIVER SERPL-CCNC: 78 U/L
ALT SERPL-CCNC: 21 U/L
ANION GAP SERPL CALC-SCNC: 5 MMOL/L (ref 0–18)
AST SERPL-CCNC: 16 U/L (ref 15–37)
BILIRUB SERPL-MCNC: 0.3 MG/DL (ref 0.1–2)
BUN BLD-MCNC: 24 MG/DL (ref 7–18)
CALCIUM BLD-MCNC: 9.3 MG/DL (ref 8.5–10.1)
CHLORIDE SERPL-SCNC: 107 MMOL/L (ref 98–112)
CHOLEST SERPL-MCNC: 127 MG/DL (ref ?–200)
CO2 SERPL-SCNC: 26 MMOL/L (ref 21–32)
CREAT BLD-MCNC: 1.32 MG/DL
EST. AVERAGE GLUCOSE BLD GHB EST-MCNC: 200 MG/DL (ref 68–126)
FASTING PATIENT LIPID ANSWER: YES
FASTING STATUS PATIENT QL REPORTED: YES
GFR SERPLBLD BASED ON 1.73 SQ M-ARVRAT: 60 ML/MIN/1.73M2 (ref 60–?)
GLOBULIN PLAS-MCNC: 3.7 G/DL (ref 2.8–4.4)
GLUCOSE BLD-MCNC: 174 MG/DL (ref 70–99)
HBA1C MFR BLD: 8.6 % (ref ?–5.7)
HDLC SERPL-MCNC: 52 MG/DL (ref 40–59)
LDLC SERPL CALC-MCNC: 56 MG/DL (ref ?–100)
NONHDLC SERPL-MCNC: 75 MG/DL (ref ?–130)
OSMOLALITY SERPL CALC.SUM OF ELEC: 294 MOSM/KG (ref 275–295)
POTASSIUM SERPL-SCNC: 4.3 MMOL/L (ref 3.5–5.1)
PROT SERPL-MCNC: 7.5 G/DL (ref 6.4–8.2)
SODIUM SERPL-SCNC: 138 MMOL/L (ref 136–145)
TRIGL SERPL-MCNC: 101 MG/DL (ref 30–149)
VLDLC SERPL CALC-MCNC: 15 MG/DL (ref 0–30)

## 2022-12-28 PROCEDURE — 80061 LIPID PANEL: CPT

## 2022-12-28 PROCEDURE — 83036 HEMOGLOBIN GLYCOSYLATED A1C: CPT

## 2022-12-28 PROCEDURE — 80053 COMPREHEN METABOLIC PANEL: CPT

## 2022-12-28 PROCEDURE — 36415 COLL VENOUS BLD VENIPUNCTURE: CPT

## 2022-12-30 ENCOUNTER — PATIENT MESSAGE (OUTPATIENT)
Dept: FAMILY MEDICINE CLINIC | Facility: CLINIC | Age: 62
End: 2022-12-30

## 2023-01-03 ENCOUNTER — TELEPHONE (OUTPATIENT)
Dept: PHYSICAL THERAPY | Facility: HOSPITAL | Age: 63
End: 2023-01-03

## 2023-01-03 DIAGNOSIS — I15.2 HYPERTENSION ASSOCIATED WITH DIABETES (HCC): ICD-10-CM

## 2023-01-03 DIAGNOSIS — E11.59 HYPERTENSION ASSOCIATED WITH DIABETES (HCC): ICD-10-CM

## 2023-01-03 RX ORDER — LOSARTAN POTASSIUM 100 MG/1
100 TABLET ORAL DAILY
Qty: 90 TABLET | Refills: 0 | Status: SHIPPED | OUTPATIENT
Start: 2023-01-03

## 2023-01-03 NOTE — TELEPHONE ENCOUNTER
From: Jessi Bailey. To: Shravan Khan MD  Sent: 12/30/2022 9:03 AM CST  Subject: A1C    I am sorry I missed the appointment yesterday. I did want to let you know that I saw my A1C. Lilliana Khan. I do think I may know what happened. My diet hasn't changed but last visit You recommended that I up my Victoza and discontinue the Glipramide. I did notice that my numbers went up. I stayed with it thinking that I needed to give it time. To date nothing has really changed. My numbers are consistently elevated. I thought I should share that so you were aware. Again sorry for the confusion. See you shortly.  Thx.

## 2023-01-04 ENCOUNTER — OFFICE VISIT (OUTPATIENT)
Dept: PHYSICAL THERAPY | Age: 63
End: 2023-01-04
Attending: NEUROLOGICAL SURGERY
Payer: COMMERCIAL

## 2023-01-04 DIAGNOSIS — M54.16 LUMBAR RADICULOPATHY: ICD-10-CM

## 2023-01-04 DIAGNOSIS — R29.898 LEFT LEG WEAKNESS: ICD-10-CM

## 2023-01-04 PROCEDURE — 97162 PT EVAL MOD COMPLEX 30 MIN: CPT

## 2023-01-04 PROCEDURE — 97110 THERAPEUTIC EXERCISES: CPT

## 2023-01-04 NOTE — PROGRESS NOTES
PHYSICAL THERAPY INITIAL EVALUATION     Date of service: 1/4/2023  Dx: Lumbar radiculopathy (M54.16)      Insurance: [address] [place] [address] Limits: 60 visit limit, auth required  Visit #: 1  Authorized # of Visits: 8  POC/Auth Expiration: 2/28/23  Authorizing Physician/Provider: Marta     PATIENT SUMMARY     History/DAJUAN: \"It's my left side more than anything. When Marta did surgery for my back is June 2021, he pulled out a piece of disc the size of a macaroni noodle that was compressing my sciatic nerve. The left calf is all atrophied from three surgeries. My right side lower back hurts all the time, probably from overcompensating. It all started with my 5-6 years ago with the Charcot foot and then the back surgery was in June 2019. I hobble around anyway because of my foot. Because the sciatic nerve was massively irritated and possibly damaged. I had an EMG and they said the nerve is messed up. I have neuropathy from diabetes. My left thigh is constantly tight and it hurts all the time. \"     The patient received some physical therapy after his foot/ankle surgeries as well as after his low back surgery. DOI/S: chronic, complex history, low back surgery 6/7/2021    Aggravating Factors: standing still, sitting with a slouched posture     Alleviating Factors: heating pad, pain medication only taken as needed    PMH: The patient's PMH was reviewed with the patient including allergies, medications, and surgical and medical history.  Patient  has a past medical history of Belching, Bloating, Constipation, Diabetic peripheral neuropathy ([address] Esophageal reflux, Flatulence/gas pain/belching, Frequent use of laxatives, Hearing loss (1-1-2019), Heartburn, High blood pressure, Indigestion, Neuropathy, Sleep apnea, Type II or unspecified type diabetes mellitus without mention of complication, not stated as uncontrolled, Unspecified essential hypertension, Visual impairment, Wears glasses (1-1-1990), and Weight gain (April 2020). Patient reports some deficits with balance due to limited sensation in his feet. PLF/Personal Goals: \"I would like to be able to walk longer distance and be more active. Patient would like to be able to ride an e-bike, just be level to have some level of activity. \" Current health fitness routine includes stretching and calf stretches as well as riding the exercise bike 20-30 minutes about once a week. 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 Patient reports numbness throughout bilateral lower legs and feet. Pain at rest: 0/10  Pain at worst: 5/10    Outcomes Measure(s): FOTO: 53.5100/100    Activity Measures:  Sleeping: No Activity Limitation: Patient has sleep apnea, but no limitations due to LBP. Sitting: Mild Activity Limitation: Patient is able to sit for hours without pain as long as he has back support. Bending Forward: Mild Activity Limitation: Patient is with pain and balance limitations with forward bending. Standing/Walking After Prolonged Sitting: No Activity Limitation: Patient is without stiffness complaints. Standing: Moderate Activity Limitation: Patient is only able to stand 5-10 minutes before onset of LBP. Walking: Moderate Activity Limitation: Patient is able to walk up to 1 mile, but has pain. Driving: No Activity Limitation: Patient is able to drive without limitations. Pushing: Mild Activity Limitation: Patient is able to push medium-weight objects up to 35lbs. Pulling: Mild Activity Limitation: Patient is able to push medium-weight objects up to 35lbs. Lifting Light Objects: Mild Activity Limitation: Patient is able to push medium-weight objects up to 35lbs. Lifting Heavy Objects: Mild Activity Limitation: Patient is able to push medium-weight objects up to 35lbs.    Ascending Stairs: Mild Activity Limitation: Patient is able to navigate stairs reciprocally, but needs railing assist.   Descending Stairs: Mild Activity Limitation: Patient is able to navigate stairs reciprocally, but needs railing assist.     Inspection/Observation: Patient demonstrates noticeable calf atrophy of the left lower leg in comparison to the right. ROM:   Lumbar Motion AROM    Right Left   Lumbar Flexion 13cm   Lumbar Extension Mild segmental mobility deficits   Trunk SB 53cm 53cm* (pain in left thigh)   *indicates activity was associated with pain    Hip Motion PROM AROM    Right Left Right Left   Hip Flexion 130 130 N/A N/A   Hip Extension N/A N/A N/A N/A   Hip ABD N/A N/A N/A N/A   Hip ER (at 90deg hip flex) 65 65 N/A N/A   Hip IR (at 90deg hip flex) 25 30 N/A N/A   *indicates activity was associated with pain    Strength/MMT:  Hip Motion Strength    Right Left   Hip Flexion 5/5 4-/5   Hip Extension 4/5 4-/5   Hip ABD 4/5 4-/5   Hip ER 4/5 4/5   Hip IR  4+/5 4+/5   *indicates activity was associated with pain     Knee Motion Strength    Right Left   Knee Extension 5/5 4-/5   Knee Flexion 5/5 4+/5   *indicates activity was associated with pain    Ankle Motion Strength    Right Left   Ankle OKC DF 5/5 4-/5   Ankle OKC PF 5/5 4/5   *indicates activity was associated with pain  Patient is unable to perform a SLHR of any height on his left side, full AROM on his right. Balance:   (R) 5sec, (L) 4 sec    Special Tests:   Low Back Special Tests: Forward spinal flexion: (-)  Spinal extension: (-)  SLR: (R) (+), (L) (-)  Hip FADIR (hip scour): (R) (-), (L) (+) mild pain on outside of thigh, no groin pain    ASSESSMENT:     [facility] is a 57year old-year-old male that presents to physical therapy evaluation s/p left L4-L5 fragmentectomy and previous diagnosis of Charcot of the left foot requiring three surgical interventions.  The patient was seen for therapy after his surgeries, but is still with deficits and functional limitations. The patient reports right-sided low back pain as well as remaining left myelopathy affecting the L4-L5 myotome, limiting hip flexor and quad strength. Additionally, due to diabetic neuropathy and the remaining deficits after his foot surgeries, the patient is with gross limitations in foot/ankle strength, stability, and balance. The patient is with complaints of \"tightness\" in his left thigh. The patient demonstrates overall decent hip mobility and flexibility with his primary deficit being strength and stability. Current functional limitation include but are not limited to prolonged standing, walking long distances, pushing/pulling/lifting, as well as balance/stability for community integration. The patient would benefit from physical therapy to facilitate improved core, hip, knee, and foot/ankle strength and stability/balance for improved tolerance to ADL's, resuming an active lifestyle, and for safety with community ambulation. Precautions/WB Status: WBAT  Education or Treatment Limitation(s): None  Rehab Potential: Fair    TREATMENT:     Initial Evaluation: x 20min     Therapeutic Exercise: x 17min  DL bridging: 1 x 10 - hamstring, cramping, discontinued due to LBP  S/L clamshell: discontinued due to left thigh pain  Supine clamshell: 2 x 10 (B), red theraband  SLR: 2 x 10 (L)  Patient Education: Patient was educated on anatomy and pathophysiology of current condition, rationale for physical therapy, anticipated treatment interventions, prognosis, timeline for recovery, and expected functional outcomes based on evaluative findings. Patient was educated on the importance of compliance with consistent treatment and HEP to achieve mutually established goals. Administered HEP: Issued and reviewed HEP handout, exercise selection, and recommended resistance. Provided verbal and written instructions/cueing for proper technique and common errors/compensations as needed.     Neuromuscular Re-education: x 3min  Hooklying PPT: 2 x 10 x 3\"     Home Exercise Program: Patient was issued a HEP handout 1/4/2023 including DL bridging, supine clamshell, and SLR. Provider Interactions With Patient:   Patient education was provided as described above. All patient's questions were answered and the patient denied further comments, complaints, or concerns upon departure. Patient was issued an appt list and verbally confirmed the next appt date and time to ensure consistency with physical therapy attendance. Charges: PT Eval Moderate Complexity Complexity x 1, Therex x 2  Total Timed Treatment: 20min       Total Treatment Time: 40min     Based on clinical rationale and outcome measures, this evaluation involved Moderate Complexity decision making due to 1-2 personal factors/comorbidities, 3 body structures involved/activity limitations, and evolving symptoms including changing pain levels, balance deficits, and neurological involvement. PLAN OF CARE:      Goals:  Short-Term Goals:  1. Patient will improve low back and hip mobility as well as core strength to allow for intermittent pain-free forward bending to reach for and  objects up from the floor. Timeframe: 4 weeks. 2. Patient will improve low back and hip mobility to facilitate [erect standing, erect standing and walking after prolonged sitting of 2 hour(s). Timeframe: 4 weeks. Long-Term Goals:  1. Patient will improve low back mobility and postural endurance to maintain proper posture for prolonged standing of 2-3 hours for household activities and community integration. Timeframe: 8 weeks. 2. Patient will improve core strength and LE strength to facilitate lifting/pushing/pulling objects up to 50lbs for household chores and yardwork. Timeframe: 12 weeks. 3. Patient will improve static and dynamic balance and foot/ankle stabilization to facilitate SLS for > 15 sec for safety with community ambulation. Timeframe: 12 weeks.    4. Patient will improve low back pain and core endurance to facilitate walking distances of 2 miles daily to facilitate household ambulation and community ambulation. Timeframe: 12 weeks. Plan Frequency / Duration: Patient will be seen for 2x/week for 6 weeks, for a total of 12 visits, over a 90 day period. We will re-evaluate the patient at that time in order to determine functional progress, evaluate short-term goal completion, and establish an updated plan of care. Possible treatment interventions will/may include: Therapeutic Activities, Therapeutic Exercise, Neuromuscular Re-education, Manual Therapy, Home Exercise Program Instruction, Patient Education, Self-Care/Home Management, and Modalities as needed. Patient/Family was advised of these findings, precautions, and treatment options and has agreed to actively participate in planning and for this course of care. Thank you for your referral. Please co-sign or sign and return this letter via fax as soon as possible to [phone]. If you have any questions, please contact me at Dept: [phone]. Sincerely,    X___Shana Marsh, PT, DPT, SCS, ATC, CSCS____ Date: _____1/4/2023________    Electronically signed by therapist: Leona Arguello PT  [de-identified] certification required: Yes  I certify the need for these services furnished under this plan of treatment and while under my care.

## 2023-01-09 ENCOUNTER — OFFICE VISIT (OUTPATIENT)
Dept: PHYSICAL THERAPY | Age: 63
End: 2023-01-09
Attending: NEUROLOGICAL SURGERY
Payer: COMMERCIAL

## 2023-01-09 PROCEDURE — 97140 MANUAL THERAPY 1/> REGIONS: CPT

## 2023-01-09 PROCEDURE — 97110 THERAPEUTIC EXERCISES: CPT

## 2023-01-09 PROCEDURE — 97112 NEUROMUSCULAR REEDUCATION: CPT

## 2023-01-09 RX ORDER — ATORVASTATIN CALCIUM 10 MG/1
TABLET, FILM COATED ORAL
Qty: 90 TABLET | Refills: 0 | Status: SHIPPED | OUTPATIENT
Start: 2023-01-09

## 2023-01-09 NOTE — PROGRESS NOTES
Date of Service: 1/9/2023  Dx: Lumbar radiculopathy (M54.16)               Insurance: [email or web address] [facility] Limits: 60 visit limit, auth required  Visit #: 2  Authorized # of Visits: 8  POC/Auth Expiration: 2/28/2023  Date of Last PN: 1/4/23 (Visit #1)  Authorizing Physician/Provider: Vickey Goodell Next MD visit: N/A  Fall Risk: Standard         Precautions: None            Subjective: The patient reports that he walked about a mile with the dog over the weekend and his thigh has been sore ever since. He reports that doing the leg lifts was difficult, \"the last 5 really hurt. It definitely ptobably was faitgued. This muscle is always way too tight. \" The patient reports that he used a massage gun and E-stim on it, both \"helped a little bit. \"     Pain/Symptom Presentation:   Pain at rest: 3/10  Pain at worst: 6/10    Objective:   ROM:   Lumbar Motion AROM    Right Left   Lumbar Flexion 13cm   Lumbar Extension Mild segmental mobility deficits   Trunk SB 53cm 53cm* (pain in left thigh)   *indicates activity was associated with pain    Hip Motion PROM AROM    Right Left Right Left   Hip Flexion 130 130 N/A N/A   Hip Extension N/A N/A N/A N/A   Hip ABD N/A N/A N/A N/A   Hip ER (at 90deg hip flex) 65 65 N/A N/A   Hip IR (at 90deg hip flex) 25 30 N/A N/A   *indicates activity was associated with pain    Strength/MMT:  Hip Motion Strength    Right Left   Hip Flexion 5/5 4-/5   Hip Extension 4/5 4-/5   Hip ABD 4/5 4-/5   Hip ER 4/5 4/5   Hip IR  4+/5 4+/5   *indicates activity was associated with pain     Knee Motion Strength    Right Left   Knee Extension 5/5 4-/5   Knee Flexion 5/5 4+/5   *indicates activity was associated with pain    Ankle Motion Strength    Right Left   Ankle OKC DF 5/5 4-/5   Ankle OKC PF 5/5 4/5   *indicates activity was associated with pain     Balance: (last updated 1/9/23)  SLS: (R) 5sec, (L) 4 sec  Tandem balance: (R) 22sec, (L) 7sec    Treatment:    Therapeutic Exercise: x 25min  Hip PROM: hip IR/ER x 10 (B)   Manual hamstring stretch: x 30\"   Supine hip flexor stretch off EOB: x 30\" (B)   Supine clamshell: 2 x 10 (B), red theraband  SLR: 2 x 10 (B)  S/L thoracic rotation: 1 x 10 (B)   Seated low back flexion stretch: x 30\"   Modified downward dog: 1 x 10 x 3\"   DLHR: 1 x 10, 1 x 8 (B) cues for symmetrical 50:50 WB on both feet    Neuromuscular Re-education: x 10min  Hooklying PPT: 2 x 10 x 3\"   Dead bug adelina SB squeeze: 1 x 10 x 3\" (B) with YSB  Tandem stance balance: x 20\" (B)   Fwd/bkwd weight shift: x 8 (B)     Manual Therapy: x 10min  Lateral traction hip joint mobs: Grade 3, 4 x 10\" (B)   Soft tissue mobilization: low back musculature, lumbar paraspinals, QL   Lumbar PA accessory joint mobs - sidelying: L1-L5- Grade 3, 2 x 10\" each  Thoracic PA accessory joint mobs - sidelying: T1-T12: Grade 3, 2 x 10\" each - mild pain noted in upper thoracic spine    Provider Interactions With Patient:   Added therapeutic exercises as documented, with cueing provided throughout performance to ensure correct technique during exercise. Assessment: Ed reports good compliance with his HEP. He reports that he does need an occasional extra rest break with the SLR's due to muscle fatigue. The patient was taken through additional hip mobility and flexibility exercises. The patient did have some TTP over the thoracic spine with mobility deficits, which we also treated with additional thoracic mobility exercises. We added some basic balance exercises this date. 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 To add to it, he also reports a previous ant tib tendon reconstruction on his left with a partial tear of the same tendon on his right foot. Given this, we will attempt to progress his balance and foot/ankle strength as able.  The patient would benefit from continued therapy for further progression in LE strength and stability and hip and low back mobility, flexibility, and core strength for improved ADL performance. Goals:   Short-Term Goals:  1. Patient will improve low back and hip mobility as well as core strength to allow for intermittent pain-free forward bending to reach for and  objects up from the floor. Timeframe: 4 weeks. 2. Patient will improve low back and hip mobility to facilitate [erect standing, erect standing and walking after prolonged sitting of 2 hour(s). Timeframe: 4 weeks. Long-Term Goals:  1. Patient will improve low back mobility and postural endurance to maintain proper posture for prolonged standing of 2-3 hours for household activities and community integration. Timeframe: 8 weeks. 2. Patient will improve core strength and LE strength to facilitate lifting/pushing/pulling objects up to 50lbs for household chores and yardwork. Timeframe: 12 weeks. 3. Patient will improve static and dynamic balance and foot/ankle stabilization to facilitate SLS for > 15 sec for safety with community ambulation. Timeframe: 12 weeks. 4. Patient will improve low back pain and core endurance to facilitate walking distances of 2 miles daily to facilitate household ambulation and community ambulation. Timeframe: 12 weeks. Plan: Continue to progress lumbar and hip mobility and flexibility, core strength, and LE strength as tolerated. Update HEP is no adverse reaction to exercise progressions. HEP: Patient was issued a HEP handout 1/4/2023 including DL bridging, supine clamshell, and SLR.        Charges: MT x 1, Therex x 1, NMR x 1       Total Timed Treatment: 45min    Total Treatment Time: 45min

## 2023-01-11 ENCOUNTER — OFFICE VISIT (OUTPATIENT)
Dept: PHYSICAL THERAPY | Age: 63
End: 2023-01-11
Attending: NEUROLOGICAL SURGERY
Payer: COMMERCIAL

## 2023-01-11 PROCEDURE — 97140 MANUAL THERAPY 1/> REGIONS: CPT

## 2023-01-11 PROCEDURE — 97110 THERAPEUTIC EXERCISES: CPT

## 2023-01-11 PROCEDURE — 97112 NEUROMUSCULAR REEDUCATION: CPT

## 2023-01-11 NOTE — PROGRESS NOTES
Date of Service: 1/11/2023  Dx: Lumbar radiculopathy (M54.16)               Insurance: [address] [place] [address] Limits: 60 visit limit, auth required  Visit #: 3  Authorized # of Visits: 8  POC/Auth Expiration: 2/28/2023  Date of Last PN: 1/4/23 (Visit #1)  Authorizing Physician/Provider: Josi Solitario MD visit: N/A  Fall Risk: Standard         Precautions: None            Subjective: \"Things are horrid. When I was doing my exercises today, it was cramping up. Everything else was okay. Doing the SLR's and the bridges cause the tightness. It's just tight 100% of the time. \"     Pain/Symptom Presentation:   Pain at rest: 4/10  Pain at worst: 6/10    Objective:   ROM:   Lumbar Motion AROM    Right Left   Lumbar Flexion 13cm   Lumbar Extension Mild segmental mobility deficits   Trunk SB 53cm 53cm* (pain in left thigh)   *indicates activity was associated with pain    Hip Motion PROM AROM    Right Left Right Left   Hip Flexion 130 130 N/A N/A   Hip Extension N/A N/A N/A N/A   Hip ABD N/A N/A N/A N/A   Hip ER (at 90deg hip flex) 65 65 N/A N/A   Hip IR (at 90deg hip flex) 25 30 N/A N/A   *indicates activity was associated with pain    Strength/MMT:  Hip Motion Strength    Right Left   Hip Flexion 5/5 4-/5   Hip Extension 4/5 4-/5   Hip ABD 4/5 4-/5   Hip ER 4/5 4/5   Hip IR  4+/5 4+/5   *indicates activity was associated with pain     Knee Motion Strength    Right Left   Knee Extension 5/5 4-/5   Knee Flexion 5/5 4+/5   *indicates activity was associated with pain    Ankle Motion Strength    Right Left   Ankle OKC DF 5/5 4-/5   Ankle OKC PF 5/5 4/5   *indicates activity was associated with pain     Balance: (last updated 1/9/23)  SLS: (R) 5sec, (L) 4 sec  Tandem balance: (R) 22sec, (L) 7sec    Treatment:    Therapeutic Exercise: x 25min  Hip PROM: hip IR/ER x 10 (B)   Manual hamstring stretch: x 30\"   Supine hip flexor stretch off EOB: x 30\" (B)   Supine clamshell: 2 x 10 (B), red theraband  S/L thoracic rotation: 1 x 10 (B) --> Progress next session   Modified downward dog: 1 x 10 x 3\"   DLHR: 1 x 10, 1 x 8 (B) cues for symmetrical 50:50 WB on both feet  Standing elastic band hip ABD: 1 x 10 (B) - mild discomfort in low back   Standing hip marching: 1 x 10 (B)     Neuromuscular Re-education: x 5min  Hooklying PPT: 2 x 10 x 3\"   Dead bug adelina SB squeeze: 1 x 10 x 3\" (B) with YSB    Manual Therapy: x 10min  Lateral traction hip joint mobs: Grade 3, 4 x 10\" (B)   Soft tissue mobilization: low back musculature, lumbar paraspinals, QL   Lumbar PA accessory joint mobs - sidelying: L1-L5- Grade 3, 2 x 10\" each  Thoracic PA accessory joint mobs - sidelying: T1-T12: Grade 3, 2 x 10\" each - mild pain noted in upper thoracic spine    Provider Interactions With Patient:   Added therapeutic exercises as documented, with cueing provided throughout performance to ensure correct technique during exercise. Assessment: Ed reports that the [facility] AND [facility] are causing lasting cramping and tightness in his left thigh, hip flexor, and quad. The patient was advised to remove SLR's from HEP which was replaced with standing marching. The patient was able to complete all other exercises as instructed. The patient would benefit from continued therapy for further progression in core and LE strength as well as balance and stability. Goals:   Short-Term Goals:  1. Patient will improve low back and hip mobility as well as core strength to allow for intermittent pain-free forward bending to reach for and  objects up from the floor. Timeframe: 4 weeks. 2. Patient will improve low back and hip mobility to facilitate [erect standing, erect standing and walking after prolonged sitting of 2 hour(s). Timeframe: 4 weeks. Long-Term Goals:  1. Patient will improve low back mobility and postural endurance to maintain proper posture for prolonged standing of 2-3 hours for household activities and community integration. Timeframe: 8 weeks.   2. Patient will improve core strength and LE strength to facilitate lifting/pushing/pulling objects up to 50lbs for household chores and yardwork. Timeframe: 12 weeks. 3. Patient will improve static and dynamic balance and foot/ankle stabilization to facilitate SLS for > 15 sec for safety with community ambulation. Timeframe: 12 weeks. 4. Patient will improve low back pain and core endurance to facilitate walking distances of 2 miles daily to facilitate household ambulation and community ambulation. Timeframe: 12 weeks. Plan: Follow up on tolerance to adding standing marching HEP to replace SLR. HEP: Patient was issued a HEP handout 1/4/2023 including DL bridging, supine clamshell, and Standing marching.       Charges: MT x 1, Therex x 1, NMR x 1       Total Timed Treatment: 40min    Total Treatment Time: 40min

## 2023-01-16 ENCOUNTER — OFFICE VISIT (OUTPATIENT)
Dept: PHYSICAL THERAPY | Age: 63
End: 2023-01-16
Attending: NEUROLOGICAL SURGERY
Payer: COMMERCIAL

## 2023-01-16 PROCEDURE — 97110 THERAPEUTIC EXERCISES: CPT

## 2023-01-16 PROCEDURE — 97112 NEUROMUSCULAR REEDUCATION: CPT

## 2023-01-16 PROCEDURE — 97140 MANUAL THERAPY 1/> REGIONS: CPT

## 2023-01-16 NOTE — PROGRESS NOTES
Date of Service: 1/16/2023  Dx: Lumbar radiculopathy (M54.16)               Insurance: [employer] Limits: 60 visit limit, auth required  Visit #: 4  Authorized # of Visits: 8  POC/Auth Expiration: 2/28/2023  Date of Last PN: 1/4/23 (Visit #1)  Authorizing Physician/Provider: Hal Solitario MD visit: N/A  Fall Risk: Standard         Precautions: None            Subjective: \"It's sore. We were putting away Julien stuff so I was carrying totes up and down the stairs. I would say it's fine, it's not any worse than normal.\" The patient reports that his right foot is a 8/10. \"The right foot does all the work. My right foot hasn't had all the surgeries of the left foot, but I did have to have a toe amputated and I have a partially torn tibial tendon and it gets irritated sometimes. The tendon on the toe can get really tight. \" He reports that he did the SLR's on his right side and the standing marching was \"so much easier\" on the left side.      Pain/Symptom Presentation:   Pain at rest: 1/10  Pain at worst: 4/10    Objective:   ROM:   Lumbar Motion AROM    Right Left   Lumbar Flexion 13cm   Lumbar Extension Mild segmental mobility deficits   Trunk SB 53cm 53cm* (pain in left thigh)   *indicates activity was associated with pain    Hip Motion PROM AROM    Right Left Right Left   Hip Flexion 130 130 N/A N/A   Hip Extension N/A N/A N/A N/A   Hip ABD N/A N/A N/A N/A   Hip ER (at 90deg hip flex) 65 65 N/A N/A   Hip IR (at 90deg hip flex) 25 30 N/A N/A   *indicates activity was associated with pain    Strength/MMT:  Hip Motion Strength    Right Left   Hip Flexion 5/5 4-/5   Hip Extension 4/5 4-/5   Hip ABD 4/5 4-/5   Hip ER 4/5 4/5   Hip IR  4+/5 4+/5   *indicates activity was associated with pain     Knee Motion Strength    Right Left   Knee Extension 5/5 4-/5   Knee Flexion 5/5 4+/5   *indicates activity was associated with pain    Ankle Motion Strength    Right Left   Ankle OKC DF 5/5 4-/5 Ankle OKC PF 5/5 4/5   *indicates activity was associated with pain     Balance: (last updated 1/9/23)  SLS: (R) 5sec, (L) 4 sec  Tandem balance: (R) 22sec, (L) 7sec    Treatment:    Therapeutic Exercise: x 25min  Hip PROM: hip IR/ER x 10 (B)   Manual hamstring stretch: x 30\"   Supine hip flexor stretch off EOB: x 30\" (B)   Supine piriformis stretch: x 30\" (L)  Supine clamshell: 2 x 10 (B), green theraband  Modified downward dog: 1 x 10 x 3\"   DLHR: 2 x 10   Standing hip marching: 1 x 10 (B)   Shuttle SLP: 2 x 10 (L), 4 cords    Neuromuscular Re-education: x 10min  Dead bug adelina SB squeeze: 1 x 10 x 3\" (B) with YSB  Ankle wobble/balance board: A/P, M/L taps x 10, balance 2 x 20\" each  A/P weight shift in corner: 1 x 10   Pallof press: 1 x 10 (B), black power band    Manual Therapy: x 10min  Lateral traction hip joint mobs: Grade 3, 4 x 10\" (B)   Soft tissue mobilization: low back musculature, lumbar paraspinals, QL   Lumbar PA accessory joint mobs - prone: L1-L5- Grade 3, 2 x 10\" each  Thoracic PA accessory joint mobs - prone: T1-T12: Grade 3, 2 x 10\" each - mild pain noted in upper thoracic spine    Provider Interactions With Patient:   Added therapeutic exercises as documented, with cueing provided throughout performance to ensure correct technique during exercise. Assessment: Ed was with better tolerance to standing marching with left leg in HEP. The patient was taken through additional balance and strengthening exercises, which he tolerated well. We also progressed some balance exercises for additional stability and safety. The patient would benefit from continued therapy for further progression in core and LE strength for improved tolerance to ADL's. Update HEP next session. Goals:   Short-Term Goals:  1. Patient will improve low back and hip mobility as well as core strength to allow for intermittent pain-free forward bending to reach for and  objects up from the floor.  Timeframe: 4 weeks.  2. Patient will improve low back and hip mobility to facilitate [erect standing, erect standing and walking after prolonged sitting of 2 hour(s). Timeframe: 4 weeks. Long-Term Goals:  1. Patient will improve low back mobility and postural endurance to maintain proper posture for prolonged standing of 2-3 hours for household activities and community integration. Timeframe: 8 weeks. 2. Patient will improve core strength and LE strength to facilitate lifting/pushing/pulling objects up to 50lbs for household chores and yardwork. Timeframe: 12 weeks. 3. Patient will improve static and dynamic balance and foot/ankle stabilization to facilitate SLS for > 15 sec for safety with community ambulation. Timeframe: 12 weeks. 4. Patient will improve low back pain and core endurance to facilitate walking distances of 2 miles daily to facilitate household ambulation and community ambulation. Timeframe: 12 weeks. Plan: Continue to progress core and LE strengthening. Update HEP next session. HEP: Patient was issued a HEP handout 1/4/2023 including DL bridging, supine clamshell, and Standing marching.       Charges: MT x 1, Therex x 1, NMR x 1       Total Timed Treatment: 40min    Total Treatment Time: 40min

## 2023-01-18 ENCOUNTER — OFFICE VISIT (OUTPATIENT)
Dept: PHYSICAL THERAPY | Age: 63
End: 2023-01-18
Attending: NEUROLOGICAL SURGERY
Payer: COMMERCIAL

## 2023-01-18 PROCEDURE — 97140 MANUAL THERAPY 1/> REGIONS: CPT

## 2023-01-18 PROCEDURE — 97110 THERAPEUTIC EXERCISES: CPT

## 2023-01-18 NOTE — PROGRESS NOTES
Date of Service: 1/18/2023  Dx: Lumbar radiculopathy (M54.16)               Insurance: [employer] [facility] Limits: 60 visit limit, auth required  Visit #: 5  Authorized # of Visits: 8  POC/Auth Expiration: 2/28/2023  Date of Last PN: 1/4/23 (Visit #1)  Authorizing Physician/Provider: Marta Solitario MD visit: N/A  Fall Risk: Standard         Precautions: None            Subjective  The patient reports that the marches are much easier but he still has some tightness in his left hip and quad. He reports that the clamshells are getting easier.      Pain/Symptom Presentation:   Pain at rest: 1/10  Pain at worst: 4/10    Objective:   ROM:   Lumbar Motion AROM    Right Left   Lumbar Flexion 13cm   Lumbar Extension Mild segmental mobility deficits   Trunk SB 53cm 53cm* (pain in left thigh)   *indicates activity was associated with pain    Hip Motion PROM AROM    Right Left Right Left   Hip Flexion 130 130 N/A N/A   Hip Extension N/A N/A N/A N/A   Hip ABD N/A N/A N/A N/A   Hip ER (at 90deg hip flex) 65 65 N/A N/A   Hip IR (at 90deg hip flex) 25 30 N/A N/A   *indicates activity was associated with pain    Strength/MMT:  Hip Motion Strength    Right Left   Hip Flexion 5/5 4-/5   Hip Extension 4/5 4-/5   Hip ABD 4/5 4-/5   Hip ER 4/5 4/5   Hip IR  4+/5 4+/5   *indicates activity was associated with pain     Knee Motion Strength    Right Left   Knee Extension 5/5 4-/5   Knee Flexion 5/5 4+/5   *indicates activity was associated with pain    Ankle Motion Strength    Right Left   Ankle OKC DF 5/5 4-/5   Ankle OKC PF 5/5 4/5   *indicates activity was associated with pain     Balance: (last updated 1/9/23)  SLS: (R) 5sec, (L) 4 sec  Tandem balance: (R) 22sec, (L) 7sec    Treatment:    Therapeutic Exercise: x 20min  Hip PROM: hip IR/ER x 10 (B)   Manual hamstring stretch: x 30\"   Supine hip flexor stretch off EOB: x 30\" (B)   Half-kneeling hip ADD/ER stretch: x 30\" (L)   Supine piriformis stretch: x 30\" (L)  S/L clamshell: 2 x 10 (B), green theraband  Modified downward dog: 1 x 10 x 3\"   DLHR: 2 x 10   HEP update: Reviewed new HEP handout with new exercises and progressions, provided verbal and written instructions/cueing for proper technique and common errors/compensations as needed    Neuromuscular Re-education: x 5min  90-90 heel taps: x 10 (B)    Pallof press: 1 x 10 (B), black power band    Manual Therapy: x 20min  Lateral traction hip joint mobs: Grade 3, 4 x 10\" (B)   Supine posterior hip joint mobs: Grade 3, 2 x 10\" (B)   Prone hip PA joint mobs: Grade 3, 4 x 10\" (B)    Lumbar PA accessory joint mobs - prone: L1-L5- Grade 3, 2 x 10\" each  Thoracic PA accessory joint mobs - prone: T1-T12: Grade 3, 2 x 10\" each     Provider Interactions With Patient:   Provided patient with a written copy of exercise instruction which was also documented in patient's electronic medical chart. Assessment: We continue to work on hip and low back mobility Ed. The patient was taken through additional low back mobility and hip flexibility, which were added to updated HEP issued this session. The patient was with some increased TTP over the lumbar spine this date. He is still with moderate deficits in thoracic spine mobility which we continue to address. We will continue to address mobility and flexibility deficits as able. Goals:   Short-Term Goals:  1. Patient will improve low back and hip mobility as well as core strength to allow for intermittent pain-free forward bending to reach for and  objects up from the floor. Timeframe: 4 weeks. 2. Patient will improve low back and hip mobility to facilitate [erect standing, erect standing and walking after prolonged sitting of 2 hour(s). Timeframe: 4 weeks. Long-Term Goals:  1. Patient will improve low back mobility and postural endurance to maintain proper posture for prolonged standing of 2-3 hours for household activities and community integration.  Timeframe: 8 weeks.  2. Patient will improve core strength and LE strength to facilitate lifting/pushing/pulling objects up to 50lbs for household chores and yardwork. Timeframe: 12 weeks. 3. Patient will improve static and dynamic balance and foot/ankle stabilization to facilitate SLS for > 15 sec for safety with community ambulation. Timeframe: 12 weeks. 4. Patient will improve low back pain and core endurance to facilitate walking distances of 2 miles daily to facilitate household ambulation and community ambulation. Timeframe: 12 weeks. Plan: Follow up on tolerance to updated HEP. Continue to work hip and low back mobility. HEP: Patient was issued a HEP handout 1/18/23 including cat/camel stretch, supine piriformis stretch, supine hip flexor stretch, 90-90 heel taps, S/L clamshell, and modified downward dog.      Charges: MT x 1, Therex x 2      Total Timed Treatment: 45min    Total Treatment Time: 45min

## 2023-01-23 RX ORDER — GABAPENTIN 300 MG/1
CAPSULE ORAL
Qty: 180 CAPSULE | Refills: 0 | Status: SHIPPED | OUTPATIENT
Start: 2023-01-23

## 2023-02-01 ENCOUNTER — OFFICE VISIT (OUTPATIENT)
Dept: PHYSICAL THERAPY | Age: 63
End: 2023-02-01
Attending: NEUROLOGICAL SURGERY
Payer: COMMERCIAL

## 2023-02-01 DIAGNOSIS — E11.42 DIABETIC POLYNEUROPATHY ASSOCIATED WITH TYPE 2 DIABETES MELLITUS (HCC): ICD-10-CM

## 2023-02-01 DIAGNOSIS — E11.40 TYPE 2 DIABETES MELLITUS WITH DIABETIC NEUROPATHY, WITHOUT LONG-TERM CURRENT USE OF INSULIN (HCC): ICD-10-CM

## 2023-02-01 PROCEDURE — 97110 THERAPEUTIC EXERCISES: CPT

## 2023-02-01 PROCEDURE — 97112 NEUROMUSCULAR REEDUCATION: CPT

## 2023-02-01 PROCEDURE — 97140 MANUAL THERAPY 1/> REGIONS: CPT

## 2023-02-01 NOTE — PROGRESS NOTES
Date of Service: 2/1/2023  Dx: Lumbar radiculopathy (M54.16)               Insurance: [address] [place] [address] Limits: 60 visit limit, auth required  Visit #: 6  Authorized # of Visits: 8  POC/Auth Expiration: 2/28/2023  Date of Last PN: 1/4/23 (Visit #1)  Authorizing Physician/Provider: Marta Solitario MD visit: N/A  Fall Risk: Standard         Precautions: None            Subjective  The patient reports that he has been trying to use a TENS unit at home that causes twitches in his leg. He reports that he is with minimal to no pain in his back, but he is still with cramping in his leg. The patient reports that while he's sitting, it will tighten up and when he goes to stand up, it'll hurt.      Pain/Symptom Presentation:   Pain at rest: 1/10  Pain at worst: 4/10    Objective:   ROM:   Lumbar Motion AROM    Right Left   Lumbar Flexion 13cm   Lumbar Extension Mild segmental mobility deficits   Trunk SB 53cm 53cm* (pain in left thigh)   *indicates activity was associated with pain    Hip Motion PROM AROM    Right Left Right Left   Hip Flexion 130 130 N/A N/A   Hip Extension N/A N/A N/A N/A   Hip ABD N/A N/A N/A N/A   Hip ER (at 90deg hip flex) 65 65 N/A N/A   Hip IR (at 90deg hip flex) 25 30 N/A N/A   *indicates activity was associated with pain    Strength/MMT:  Hip Motion Strength    Right Left   Hip Flexion 5/5 4-/5   Hip Extension 4/5 4-/5   Hip ABD 4/5 4-/5   Hip ER 4/5 4/5   Hip IR  4+/5 4+/5   *indicates activity was associated with pain     Knee Motion Strength    Right Left   Knee Extension 5/5 4-/5   Knee Flexion 5/5 4+/5   *indicates activity was associated with pain    Ankle Motion Strength    Right Left   Ankle OKC DF 5/5 4-/5   Ankle OKC PF 5/5 4/5   *indicates activity was associated with pain     Balance: (last updated 1/9/23)  SLS: (R) 5sec, (L) 4 sec  Tandem balance: (R) 22sec, (L) 7sec    Treatment:    Therapeutic Exercise: x 30min  Hip PROM: hip IR/ER x 10 (B)   Prone quad stretch: x 30\" (B)  - limited by pain on left side  Manual hamstring stretch: x 30\"   Supine hip flexor stretch off EOB: x 30\" (B)  SLR: 2 x 10 (B)  LAQ: 2 x 10 (B), 8# ankle weight   Standing marching: 1 x 10 (B), 4# ankle weight  Modified downward dog: 1 x 10 x 3\"     Neuromuscular Re-education: x 10min  Trigger point release: (L) quad, hip flexor/iliopsoas  90-90 heel taps: x 10 (B)    Pallof press: 1 x 10 (B), black power band    Manual Therapy: x 5min  Lumbar PA accessory joint mobs - prone: L1-L5- Grade 3, 2 x 10\" each    Provider Interactions With Patient:   Verbal and manual cueing on proper performance of the prescribed exercises  Added in additional quad and hip flexor strengthening exercises. Assessment: Ed arrives today with some remaining frustrations about the cramping and spasms in her left quad and hip flexor, but reports that his back is much better. He has been doing his core strengthening HEP diligently, and more than asked and feels that it has helped with LBP. The patient was encouraged to focus on the improvement he has seen in his low back pain, which has been our primary focus recently, and then we will continue to work on the tightness he feels in his left leg. Despite the remaining tightness complaints, the patient's prone quad stretch is limited mostly by pain versus soft tissue. Additionally, he was able to do 20 SLR's this date, which previously aggravated his leg and caused additional tightness. We progressed quad and hip flexorstre    Goals:   Short-Term Goals:  1. Patient will improve low back and hip mobility as well as core strength to allow for intermittent pain-free forward bending to reach for and  objects up from the floor. Timeframe: 4 weeks. 2. Patient will improve low back and hip mobility to facilitate [erect standing, erect standing and walking after prolonged sitting of 2 hour(s). Timeframe: 4 weeks. Long-Term Goals:  1.  Patient will improve low back mobility and postural endurance to maintain proper posture for prolonged standing of 2-3 hours for household activities and community integration. Timeframe: 8 weeks. 2. Patient will improve core strength and LE strength to facilitate lifting/pushing/pulling objects up to 50lbs for household chores and yardwork. Timeframe: 12 weeks. 3. Patient will improve static and dynamic balance and foot/ankle stabilization to facilitate SLS for > 15 sec for safety with community ambulation. Timeframe: 12 weeks. 4. Patient will improve low back pain and core endurance to facilitate walking distances of 2 miles daily to facilitate household ambulation and community ambulation. Timeframe: 12 weeks. Plan: Continue to progress quad and hip flexor strengthening. HEP: Patient was issued a HEP handout 1/18/23 including cat/camel stretch, supine piriformis stretch, supine hip flexor stretch, 90-90 heel taps, S/L clamshell, and modified downward dog.      Charges: MT x 1, Therex x 1, NMR x 1    Total Timed Treatment: 45min    Total Treatment Time: 45min

## 2023-02-02 RX ORDER — EMPAGLIFLOZIN 25 MG/1
25 TABLET, FILM COATED ORAL DAILY
Qty: 90 TABLET | Refills: 0 | Status: SHIPPED | OUTPATIENT
Start: 2023-02-02

## 2023-02-06 ENCOUNTER — OFFICE VISIT (OUTPATIENT)
Dept: PHYSICAL THERAPY | Age: 63
End: 2023-02-06
Attending: NEUROLOGICAL SURGERY
Payer: COMMERCIAL

## 2023-02-06 PROCEDURE — 97110 THERAPEUTIC EXERCISES: CPT

## 2023-02-06 PROCEDURE — 97112 NEUROMUSCULAR REEDUCATION: CPT

## 2023-02-06 NOTE — PROGRESS NOTES
Date of Service: 2/6/2023  Dx: Lumbar radiculopathy (M54.16)               Insurance: [employer] [facility] Limits: 60 visit limit, auth required  Visit #: 7  Authorized # of Visits: 8  POC/Auth Expiration: 2/28/2023  Date of Last PN: 1/4/23 (Visit #1)  Authorizing Physician/Provider: Tyrone Solitario MD visit: N/A  Fall Risk: Standard         Precautions: None            Subjective  The patient reports that he tried a compression sleeve for his thigh and it seems to help. The patient reports minimal to no low back pain.      Pain/Symptom Presentation:   Pain at rest: 0/10  Pain at worst: 2-3/10    Objective:   ROM:   Lumbar Motion AROM    Right Left   Lumbar Flexion 13cm   Lumbar Extension Mild segmental mobility deficits   Trunk SB 53cm 53cm* (pain in left thigh)   *indicates activity was associated with pain    Hip Motion PROM AROM    Right Left Right Left   Hip Flexion 130 130 N/A N/A   Hip Extension N/A N/A N/A N/A   Hip ABD N/A N/A N/A N/A   Hip ER (at 90deg hip flex) 65 65 N/A N/A   Hip IR (at 90deg hip flex) 25 30 N/A N/A   *indicates activity was associated with pain    Strength/MMT:  Hip Motion Strength    Right Left   Hip Flexion 5/5 4-/5   Hip Extension 4/5 4-/5   Hip ABD 4/5 4-/5   Hip ER 4/5 4/5   Hip IR  4+/5 4+/5   *indicates activity was associated with pain     Knee Motion Strength    Right Left   Knee Extension 5/5 4-/5   Knee Flexion 5/5 4+/5   *indicates activity was associated with pain    Ankle Motion Strength    Right Left   Ankle OKC DF 5/5 4-/5   Ankle OKC PF 5/5 4/5   *indicates activity was associated with pain     Balance: (last updated 1/9/23)  SLS: (R) 5sec, (L) 4 sec  Tandem balance: (R) 22sec, (L) 7sec    Treatment:    Therapeutic Exercise: x 30min  Hip PROM: hip IR/ER x 10 (B)   Prone quad stretch: x 30\" (B)  Manual hamstring stretch: x 30\"   Supine hip flexor stretch off EOB: x 30\" (B)  SLR: 2 x 10 (B)  LAQ: x 25 (L), 8# ankle weight   Standing marching: 1 x 15 (B), 4# ankle weight  Modified downward dog: 1 x 10 x 3\"   Shuttle SLP: x 20 (L), 4 cords    Neuromuscular Re-education: x 10min  Trigger point release: (L) quad, hip flexor/iliopsoas  90-90 heel taps: x 10 (B)    Pallof press: 1 x 10 (B), black power band    Manual Therapy: x 5min  Lumbar PA accessory joint mobs - prone: L1-L5- Grade 3, 2 x 10\" each    Provider Interactions With Patient:   Verbal and manual cueing on proper performance of the prescribed exercises. Assessment: Ed continues to report good low back pain management. He purchased and has started using a compression sleeve for his left quad which does seem to be helping. The patient was with improved flexibility in the quad with prone quad stretch. The patient was also able to progress his quad strengthening exercises. He is pleased with his progress thus far and is motivated to improve. Goals:   Short-Term Goals:  1. Patient will improve low back and hip mobility as well as core strength to allow for intermittent pain-free forward bending to reach for and  objects up from the floor. Timeframe: 4 weeks. 2. Patient will improve low back and hip mobility to facilitate [erect standing, erect standing and walking after prolonged sitting of 2 hour(s). Timeframe: 4 weeks. Long-Term Goals:  1. Patient will improve low back mobility and postural endurance to maintain proper posture for prolonged standing of 2-3 hours for household activities and community integration. Timeframe: 8 weeks. 2. Patient will improve core strength and LE strength to facilitate lifting/pushing/pulling objects up to 50lbs for household chores and yardwork. Timeframe: 12 weeks. 3. Patient will improve static and dynamic balance and foot/ankle stabilization to facilitate SLS for > 15 sec for safety with community ambulation. Timeframe: 12 weeks.    4. Patient will improve low back pain and core endurance to facilitate walking distances of 2 miles daily to facilitate household ambulation and community ambulation. Timeframe: 12 weeks. Plan: Continue to progress quad strengthening. HEP: Patient was issued a HEP handout 1/18/23 including cat/camel stretch, supine piriformis stretch, supine hip flexor stretch, 90-90 heel taps, S/L clamshell, and modified downward dog. Charges:  Therex x 2, NMR x 1    Total Timed Treatment: 45min    Total Treatment Time: 45min

## 2023-02-08 ENCOUNTER — OFFICE VISIT (OUTPATIENT)
Dept: PHYSICAL THERAPY | Age: 63
End: 2023-02-08
Attending: NEUROLOGICAL SURGERY
Payer: COMMERCIAL

## 2023-02-08 PROCEDURE — 97110 THERAPEUTIC EXERCISES: CPT

## 2023-02-08 PROCEDURE — 97112 NEUROMUSCULAR REEDUCATION: CPT

## 2023-02-21 RX ORDER — LIRAGLUTIDE 6 MG/ML
INJECTION SUBCUTANEOUS
Qty: 18 ML | Refills: 0 | Status: SHIPPED | OUTPATIENT
Start: 2023-02-21

## 2023-03-01 ENCOUNTER — OFFICE VISIT (OUTPATIENT)
Dept: PHYSICAL THERAPY | Age: 63
End: 2023-03-01
Attending: NEUROLOGICAL SURGERY
Payer: COMMERCIAL

## 2023-03-01 PROCEDURE — 97112 NEUROMUSCULAR REEDUCATION: CPT

## 2023-03-01 PROCEDURE — 97110 THERAPEUTIC EXERCISES: CPT

## 2023-03-01 NOTE — PROGRESS NOTES
Date of Service: 3/1/2023  Dx: Lumbar radiculopathy (M54.16)               Insurance: [facility] Limits: 60 visit limit, auth required  Visit #: 8  Authorized # of Visits: 15  POC/Auth Expiration: 2/28/2023  Date of Last PN: 1/4/23 (Visit #7)  Authorizing Physician/Provider: Marta Solitario MD visit: N/A  Fall Risk: Standard         Precautions: None            Subjective: \"I turned my ankle a week ago so it was hard to do some of the stuff. It's just sore now. The leg is still driving me nuts. It just never seem to go away. \"     Pain/Symptom Presentation:   Pain at rest: 0/10  Pain at worst: 3-4/10    Objective:     ROM:   Lumbar Motion AROM    Right Left   Lumbar Flexion 9cm (improved from 13cm)   Lumbar Extension Mild segmental mobility deficits   *indicates activity was associated with pain    Hip Motion PROM AROM    Right Left Right Left   Hip Flexion 130 130 N/A N/A   Hip Extension N/A N/A N/A N/A   Hip ABD N/A N/A N/A N/A   Hip ER (at 90deg hip flex) 65 65 N/A N/A   Hip IR (at 90deg hip flex) 30 30 N/A N/A   *indicates activity was associated with pain    Strength/MMT:  Hip Motion Strength    Right Left   Hip Flexion 5/5 4-/5   Hip Extension 4/5 4-/5   Hip ABD 4/5 4-/5   Hip ER 4/5 4/5   Hip IR  5/5 5/5   *indicates activity was associated with pain     Knee Motion Strength    Right Left   Knee Extension 5/5 4/5   Knee Flexion 5/5 4+/5   *indicates activity was associated with pain    Ankle Motion Strength    Right Left   Ankle OKC DF 5/5 4-/5   Ankle OKC PF 5/5 4+/5   *indicates activity was associated with pain     Treatment:    Therapeutic Exercise: x 30min  Hip PROM: hip IR/ER x 10 (B)   Prone quad stretch: x 30\" (B)  Manual hamstring stretch: x 30\"   Supine hip flexor stretch off EOB: x 30\" (B)  SLR: 2 x 10 (B)  LAQ: x 25 (L), 8# ankle weight   Standing marching: 1 x 15 (B), 4# ankle weight  Modified downward dog: 1 x 10 x 3\"   Shuttle SLP: x 20 (L), 4 cords    Neuromuscular Re-education: x 10min  Trigger point release: (L) quad, hip flexor/iliopsoas  90-90 heel taps: x 10 (B)    Pallof press: 2 x 10 (B), black power band - knees straight    Manual Therapy: x 5min  Lumbar PA accessory joint mobs - prone: L1-L5- Grade 3, 2 x 10\" each    Provider Interactions With Patient:   Verbal and manual cueing on proper performance of the prescribed exercises. Assessment: Ed is going to be out of town for a few weeks in the near future so we will update his HEP next session. Overall, he has remained decently compliant with his HEP. The patient was able to complete all 20 SLR's bilaterally, but is still most challenged with 90-90 heel taps. The patient would benefit from continued therapy for further progression in core and LE strength for improved tolerance to ADL's. Goals:   Short-Term Goals:  1. Patient will improve low back and hip mobility as well as core strength to allow for intermittent pain-free forward bending to reach for and  objects up from the floor. Timeframe: 4 weeks. (MET 2/8/23)  2. Patient will improve low back and hip mobility to facilitate erect standing and walking after prolonged sitting of 2 hour(s). Timeframe: 4 weeks. (IN PROGRESS 2/8/23. Patient reports stiffness subsides within 20-30 minutes.)   Long-Term Goals:  1. Patient will improve low back mobility and postural endurance to maintain proper posture for prolonged standing of 2-3 hours for household activities and community integration. Timeframe: 8 weeks. (IN PROGRESS 2/8/23. Patient is able to stand 20-30 minutes.)   2. Patient will improve core strength and LE strength to facilitate lifting/pushing/pulling objects up to 50lbs for household chores and yardwork. Timeframe: 12 weeks. (NOTY ET MET 2/8/23)  3. Patient will improve static and dynamic balance and foot/ankle stabilization to facilitate SLS for > 15 sec for safety with community ambulation. Timeframe: 12 weeks. (NOT YET MET 2/8/23)  4.  Patient will improve low back pain and core endurance to facilitate walking distances of 2 miles daily to facilitate household ambulation and community ambulation. Timeframe: 12 weeks. (IN PROGRESS 2/8/23. Patient is able to walk 1 mile.)    Plan: Update HEP next session. HEP: Patient was issued a HEP handout 1/18/23 including cat/camel stretch, supine piriformis stretch, supine hip flexor stretch, 90-90 heel taps, S/L clamshell, and modified downward dog. Charges:  Therex x 2, NMR x 1    Total Timed Treatment: 45min    Total Treatment Time: 45min

## 2023-03-06 ENCOUNTER — APPOINTMENT (OUTPATIENT)
Dept: PHYSICAL THERAPY | Age: 63
End: 2023-03-06
Attending: NEUROLOGICAL SURGERY
Payer: COMMERCIAL

## 2023-03-09 ENCOUNTER — APPOINTMENT (OUTPATIENT)
Dept: PHYSICAL THERAPY | Age: 63
End: 2023-03-09
Attending: NEUROLOGICAL SURGERY
Payer: COMMERCIAL

## 2023-03-09 ENCOUNTER — OFFICE VISIT (OUTPATIENT)
Dept: PHYSICAL THERAPY | Age: 63
End: 2023-03-09
Attending: NEUROLOGICAL SURGERY
Payer: COMMERCIAL

## 2023-03-09 PROCEDURE — 97110 THERAPEUTIC EXERCISES: CPT

## 2023-03-09 PROCEDURE — 97140 MANUAL THERAPY 1/> REGIONS: CPT

## 2023-03-09 NOTE — PROGRESS NOTES
Date of Service: 3/9/2023  Dx: Lumbar radiculopathy (M54.16)               Insurance: [employer] Limits: 60 visit limit, auth required  Visit #: 9  Authorized # of Visits: 15  POC/Auth Expiration: 2/28/2023  Date of Last PN: 1/4/23 (Visit #7)  Authorizing Physician/Provider: Brayan Solitario MD visit: N/A  Fall Risk: Standard         Precautions: None            Subjective: \"My back is terrible. I had to go on the road for several hours. Driving and moving stuff around. I got back Tuesday, late afternoon, I did my exercises and my thigh started spasming when doing the heel taps and it's hurt like crazy since. If I put my hand on it, it feels like something's not moving smoothly. \"    Pain/Symptom Presentation:   Pain at rest: 0/10  Pain at worst: 3-4/10    Objective:     ROM:   Lumbar Motion AROM    Right Left   Lumbar Flexion 9cm (improved from 13cm)   Lumbar Extension Mild segmental mobility deficits   *indicates activity was associated with pain    Hip Motion PROM AROM    Right Left Right Left   Hip Flexion 130 130 N/A N/A   Hip Extension N/A N/A N/A N/A   Hip ABD N/A N/A N/A N/A   Hip ER (at 90deg hip flex) 65 65 N/A N/A   Hip IR (at 90deg hip flex) 30 30 N/A N/A   *indicates activity was associated with pain    Strength/MMT:  Hip Motion Strength    Right Left   Hip Flexion 5/5 4-/5   Hip Extension 4/5 4-/5   Hip ABD 4/5 4-/5   Hip ER 4/5 4/5   Hip IR  5/5 5/5   *indicates activity was associated with pain     Knee Motion Strength    Right Left   Knee Extension 5/5 4/5   Knee Flexion 5/5 4+/5   *indicates activity was associated with pain    Ankle Motion Strength    Right Left   Ankle OKC DF 5/5 4-/5   Ankle OKC PF 5/5 4+/5   *indicates activity was associated with pain     Treatment:    Therapeutic Exercise: x 20min   Hip PROM: hip IR/ER x 10 (B)   Prone quad stretch: x 30\" (B)  Manual hamstring stretch: x 30\"   Supine hip flexor stretch off EOB: x 30\" (B)  SLR: 2 x 10 (B)  LAQ: x 25 (L), 8# ankle weight   Shuttle SLP: x 14 (L), 4 cords    Neuromuscular Re-education: x 5min  Trigger point release: (L) quad, hip flexor/iliopsoas    Manual Therapy: x 15min  Lumbar PA accessory joint mobs - prone: L1-L5 - Grade 3, 4 x 10\" each  Thoracic PA accessory joint mobs - prone:T1-T12 - Grade 3, 2 x 10\" each  Soft tissue mobilization - prone: low back musculature and soft tissue  Soft tissue mobilization - hookyling: (L) quad, ITB/VL    Provider Interactions With Patient:   Verbal and manual cueing on proper performance of the prescribed exercises. Assessment: Ed was with increased low back and left thigh pain and stiffness complaints upon arriving today. He reports his left thigh has been tight since trying to do his 90-90 heel taps in his HEP. The patient has been travelling more recently, spending more time in a sitting position. We focused on pain management and completed the exercises that required additional equipment available in the PT clinic. The patient will be out of town for the next two weeks and was encouraged to remain compliant with his HEP as able. Goals:   Short-Term Goals:  1. Patient will improve low back and hip mobility as well as core strength to allow for intermittent pain-free forward bending to reach for and  objects up from the floor. Timeframe: 4 weeks. (MET 2/8/23)  2. Patient will improve low back and hip mobility to facilitate erect standing and walking after prolonged sitting of 2 hour(s). Timeframe: 4 weeks. (IN PROGRESS 2/8/23. Patient reports stiffness subsides within 20-30 minutes.)   Long-Term Goals:  1. Patient will improve low back mobility and postural endurance to maintain proper posture for prolonged standing of 2-3 hours for household activities and community integration. Timeframe: 8 weeks. (IN PROGRESS 2/8/23. Patient is able to stand 20-30 minutes.)   2.  Patient will improve core strength and LE strength to facilitate lifting/pushing/pulling objects up to 50lbs for household chores and yardwork. Timeframe: 12 weeks. (TOMASA ET MET 2/8/23)  3. Patient will improve static and dynamic balance and foot/ankle stabilization to facilitate SLS for > 15 sec for safety with community ambulation. Timeframe: 12 weeks. (NOT YET MET 2/8/23)  4. Patient will improve low back pain and core endurance to facilitate walking distances of 2 miles daily to facilitate household ambulation and community ambulation. Timeframe: 12 weeks. (IN PROGRESS 2/8/23. Patient is able to walk 1 mile.)    Plan: Re-assess pain and symptom management. HEP: Patient was issued a HEP handout 1/18/23 including cat/camel stretch, supine piriformis stretch, supine hip flexor stretch, 90-90 heel taps, S/L clamshell, and modified downward dog. Charges:  Therex x 2, MT x 1    Total Timed Treatment: 45min    Total Treatment Time: 45min

## 2023-03-23 DIAGNOSIS — E11.59 HYPERTENSION ASSOCIATED WITH DIABETES (HCC): ICD-10-CM

## 2023-03-23 DIAGNOSIS — I15.2 HYPERTENSION ASSOCIATED WITH DIABETES (HCC): ICD-10-CM

## 2023-03-24 RX ORDER — LOSARTAN POTASSIUM 100 MG/1
100 TABLET ORAL DAILY
Qty: 90 TABLET | Refills: 0 | Status: SHIPPED | OUTPATIENT
Start: 2023-03-24

## 2023-03-27 ENCOUNTER — OFFICE VISIT (OUTPATIENT)
Dept: FAMILY MEDICINE CLINIC | Facility: CLINIC | Age: 63
End: 2023-03-27
Payer: COMMERCIAL

## 2023-03-27 VITALS
DIASTOLIC BLOOD PRESSURE: 66 MMHG | BODY MASS INDEX: 31.66 KG/M2 | WEIGHT: 260 LBS | HEART RATE: 78 BPM | HEIGHT: 76 IN | TEMPERATURE: 98 F | SYSTOLIC BLOOD PRESSURE: 144 MMHG | RESPIRATION RATE: 14 BRPM

## 2023-03-27 DIAGNOSIS — E11.40 TYPE 2 DIABETES MELLITUS WITH DIABETIC NEUROPATHY, WITHOUT LONG-TERM CURRENT USE OF INSULIN (HCC): ICD-10-CM

## 2023-03-27 DIAGNOSIS — E78.00 PURE HYPERCHOLESTEROLEMIA: ICD-10-CM

## 2023-03-27 DIAGNOSIS — E11.59 HYPERTENSION ASSOCIATED WITH DIABETES (HCC): ICD-10-CM

## 2023-03-27 DIAGNOSIS — I15.2 HYPERTENSION ASSOCIATED WITH DIABETES (HCC): ICD-10-CM

## 2023-03-27 DIAGNOSIS — E66.9 OBESITY (BMI 30.0-34.9): ICD-10-CM

## 2023-03-27 DIAGNOSIS — M54.16 LUMBAR RADICULOPATHY, ACUTE: ICD-10-CM

## 2023-03-27 DIAGNOSIS — R00.2 PALPITATIONS: ICD-10-CM

## 2023-03-27 DIAGNOSIS — G47.33 OSA ON CPAP: ICD-10-CM

## 2023-03-27 DIAGNOSIS — Z99.89 OSA ON CPAP: ICD-10-CM

## 2023-03-27 DIAGNOSIS — E08.42 DIABETIC POLYNEUROPATHY ASSOCIATED WITH DIABETES MELLITUS DUE TO UNDERLYING CONDITION (HCC): Primary | ICD-10-CM

## 2023-03-27 DIAGNOSIS — Z12.5 SPECIAL SCREENING FOR MALIGNANT NEOPLASM OF PROSTATE: ICD-10-CM

## 2023-03-27 PROCEDURE — 3077F SYST BP >= 140 MM HG: CPT | Performed by: FAMILY MEDICINE

## 2023-03-27 PROCEDURE — 3008F BODY MASS INDEX DOCD: CPT | Performed by: FAMILY MEDICINE

## 2023-03-27 PROCEDURE — 3078F DIAST BP <80 MM HG: CPT | Performed by: FAMILY MEDICINE

## 2023-03-27 PROCEDURE — 99214 OFFICE O/P EST MOD 30 MIN: CPT | Performed by: FAMILY MEDICINE

## 2023-03-27 RX ORDER — GABAPENTIN 300 MG/1
300 CAPSULE ORAL 3 TIMES DAILY
Qty: 270 CAPSULE | Refills: 1 | Status: SHIPPED | OUTPATIENT
Start: 2023-03-27

## 2023-03-27 RX ORDER — TRAMADOL HYDROCHLORIDE 50 MG/1
50 TABLET ORAL EVERY 12 HOURS PRN
Qty: 40 TABLET | Refills: 0 | Status: SHIPPED | OUTPATIENT
Start: 2023-03-27 | End: 2023-04-04

## 2023-03-27 RX ORDER — GABAPENTIN 300 MG/1
300 CAPSULE ORAL 2 TIMES DAILY
Qty: 180 CAPSULE | Refills: 0 | Status: SHIPPED | OUTPATIENT
Start: 2023-03-27 | End: 2023-03-27

## 2023-03-29 ENCOUNTER — APPOINTMENT (OUTPATIENT)
Dept: PHYSICAL THERAPY | Age: 63
End: 2023-03-29
Attending: NEUROLOGICAL SURGERY
Payer: COMMERCIAL

## 2023-03-29 ENCOUNTER — TELEPHONE (OUTPATIENT)
Dept: PHYSICAL THERAPY | Age: 63
End: 2023-03-29

## 2023-03-29 RX ORDER — ATORVASTATIN CALCIUM 10 MG/1
10 TABLET, FILM COATED ORAL NIGHTLY
Qty: 90 TABLET | Refills: 0 | Status: SHIPPED | OUTPATIENT
Start: 2023-03-29

## 2023-03-29 NOTE — PROGRESS NOTES
Date of Service: 3/29/2023  Dx: Lumbar radiculopathy (M54.16)               Insurance: [address] [place] [address] Limits: 60 visit limit, auth required  Visit #: 6  Authorized # of Visits: 15  POC/Auth Expiration: 4/30/2023  Date of Last PN: 1/4/23 (Visit #7)  Authorizing Physician/Provider: Marta Solitario MD visit: N/A  Fall Risk: Standard      Precautions: None    Subjective: \"    Pain/Symptom Presentation:   Pain at rest: 0/10  Pain at worst: 3-4/10    Objective:     ROM:   Lumbar Motion AROM    Right Left   Lumbar Flexion 9cm (improved from 13cm)   Lumbar Extension Mild segmental mobility deficits   *indicates activity was associated with pain    Hip Motion PROM AROM    Right Left Right Left   Hip Flexion 130 130 N/A N/A   Hip Extension N/A N/A N/A N/A   Hip ABD N/A N/A N/A N/A   Hip ER (at 90deg hip flex) 65 65 N/A N/A   Hip IR (at 90deg hip flex) 30 30 N/A N/A   *indicates activity was associated with pain    Strength/MMT:  Hip Motion Strength    Right Left   Hip Flexion 5/5 4-/5   Hip Extension 4/5 4-/5   Hip ABD 4/5 4-/5   Hip ER 4/5 4/5   Hip IR  5/5 5/5   *indicates activity was associated with pain     Knee Motion Strength    Right Left   Knee Extension 5/5 4/5   Knee Flexion 5/5 4+/5   *indicates activity was associated with pain    Ankle Motion Strength    Right Left   Ankle OKC DF 5/5 4-/5   Ankle OKC PF 5/5 4+/5   *indicates activity was associated with pain     Treatment:    Therapeutic Exercise: x 20min   Hip PROM: hip IR/ER x 10 (B)   Prone quad stretch: x 30\" (B)  Manual hamstring stretch: x 30\"   Supine hip flexor stretch off EOB: x 30\" (B)  SLR: 2 x 10 (B)  LAQ: x 25 (L), 8# ankle weight   Shuttle SLP: x 14 (L), 4 cords    Neuromuscular Re-education: x 5min  Trigger point release: (L) quad, hip flexor/iliopsoas    Manual Therapy: x 15min  Lumbar PA accessory joint mobs - prone: L1-L5 - Grade 3, 4 x 10\" each  Thoracic PA accessory joint mobs - prone:T1-T12 - Grade 3, 2 x 10\" each  Soft tissue mobilization - prone: low back musculature and soft tissue  Soft tissue mobilization - hookyling: (L) quad, ITB/VL    Provider Interactions With Patient:   Verbal and manual cueing on proper performance of the prescribed exercises. Assessment: Ed was with increased low back and left thigh pain and stiffness complaints upon arriving today. He reports his left thigh has been tight since trying to do his 90-90 heel taps in his HEP. The patient has been travelling more recently, spending more time in a sitting position. We focused on pain management and completed the exercises that required additional equipment available in the PT clinic. The patient will be out of town for the next two weeks and was encouraged to remain compliant with his HEP as able. Goals:   Short-Term Goals:  1. Patient will improve low back and hip mobility as well as core strength to allow for intermittent pain-free forward bending to reach for and  objects up from the floor. Timeframe: 4 weeks. (MET 2/8/23)  2. Patient will improve low back and hip mobility to facilitate erect standing and walking after prolonged sitting of 2 hour(s). Timeframe: 4 weeks. (IN PROGRESS 2/8/23. Patient reports stiffness subsides within 20-30 minutes.)   Long-Term Goals:  1. Patient will improve low back mobility and postural endurance to maintain proper posture for prolonged standing of 2-3 hours for household activities and community integration. Timeframe: 8 weeks. (IN PROGRESS 2/8/23. Patient is able to stand 20-30 minutes.)   2. Patient will improve core strength and LE strength to facilitate lifting/pushing/pulling objects up to 50lbs for household chores and yardwork. Timeframe: 12 weeks. (TOMASA ET MET 2/8/23)  3. Patient will improve static and dynamic balance and foot/ankle stabilization to facilitate SLS for > 15 sec for safety with community ambulation. Timeframe: 12 weeks. (NOT YET MET 2/8/23)  4.  Patient will improve low back pain and core endurance to facilitate walking distances of 2 miles daily to facilitate household ambulation and community ambulation. Timeframe: 12 weeks. (IN PROGRESS 2/8/23. Patient is able to walk 1 mile.)    Plan: Re-assess pain and symptom management. HEP: Patient was issued a HEP handout 1/18/23 including cat/camel stretch, supine piriformis stretch, supine hip flexor stretch, 90-90 heel taps, S/L clamshell, and modified downward dog. Charges:  Therex x 2, MT x 1    Total Timed Treatment: 45min    Total Treatment Time: 45min

## 2023-03-29 NOTE — TELEPHONE ENCOUNTER
Fax received from the pharmacy for the following refill:     LOV: 3/27/23 (DM follow up)  Last Refill: 1/9/23, #90, 0 RF  Next OV: n/a    Please authorize if acceptable. Thank you!

## 2023-04-04 ENCOUNTER — OFFICE VISIT (OUTPATIENT)
Dept: PHYSICAL THERAPY | Age: 63
End: 2023-04-04
Attending: NEUROLOGICAL SURGERY
Payer: COMMERCIAL

## 2023-04-04 DIAGNOSIS — M54.16 LUMBAR RADICULOPATHY, ACUTE: ICD-10-CM

## 2023-04-04 PROCEDURE — 97140 MANUAL THERAPY 1/> REGIONS: CPT

## 2023-04-04 PROCEDURE — 97110 THERAPEUTIC EXERCISES: CPT

## 2023-04-04 RX ORDER — TRAMADOL HYDROCHLORIDE 50 MG/1
50 TABLET ORAL EVERY 6 HOURS PRN
Qty: 40 TABLET | Refills: 0 | Status: SHIPPED | OUTPATIENT
Start: 2023-04-04

## 2023-04-05 RX ORDER — LIRAGLUTIDE 6 MG/ML
INJECTION SUBCUTANEOUS
Qty: 27 ML | Refills: 1 | Status: SHIPPED | OUTPATIENT
Start: 2023-03-27

## 2023-04-06 ENCOUNTER — OFFICE VISIT (OUTPATIENT)
Dept: PHYSICAL THERAPY | Age: 63
End: 2023-04-06
Attending: NEUROLOGICAL SURGERY
Payer: COMMERCIAL

## 2023-04-06 PROCEDURE — 97110 THERAPEUTIC EXERCISES: CPT

## 2023-04-06 PROCEDURE — 97140 MANUAL THERAPY 1/> REGIONS: CPT

## 2023-04-11 ENCOUNTER — OFFICE VISIT (OUTPATIENT)
Dept: PHYSICAL THERAPY | Age: 63
End: 2023-04-11
Attending: NEUROLOGICAL SURGERY
Payer: COMMERCIAL

## 2023-04-11 PROCEDURE — 97110 THERAPEUTIC EXERCISES: CPT

## 2023-04-11 PROCEDURE — 97140 MANUAL THERAPY 1/> REGIONS: CPT

## 2023-04-13 ENCOUNTER — OFFICE VISIT (OUTPATIENT)
Dept: PHYSICAL THERAPY | Age: 63
End: 2023-04-13
Attending: NEUROLOGICAL SURGERY
Payer: COMMERCIAL

## 2023-04-13 PROCEDURE — 97110 THERAPEUTIC EXERCISES: CPT

## 2023-04-13 PROCEDURE — 97140 MANUAL THERAPY 1/> REGIONS: CPT

## 2023-04-19 ENCOUNTER — MED REC SCAN ONLY (OUTPATIENT)
Dept: FAMILY MEDICINE CLINIC | Facility: CLINIC | Age: 63
End: 2023-04-19

## 2023-04-26 ENCOUNTER — OFFICE VISIT (OUTPATIENT)
Dept: PHYSICAL THERAPY | Age: 63
End: 2023-04-26
Attending: NEUROLOGICAL SURGERY
Payer: COMMERCIAL

## 2023-04-26 PROCEDURE — 97140 MANUAL THERAPY 1/> REGIONS: CPT

## 2023-04-26 PROCEDURE — 97110 THERAPEUTIC EXERCISES: CPT

## 2023-04-26 NOTE — PROGRESS NOTES
Date of Service: 4/13/2023  Dx: Lumbar radiculopathy (M54.16)               Insurance: [address] [place] [address] Limits: 60 visit limit, auth required  Visit #: 14  Authorized # of Visits: 15  POC/Auth Expiration: 2/28/2023  Date of Last PN: 1/4/23 (Visit #7)  Authorizing Physician/Provider: Rivera Solitario MD visit: N/A  Fall Risk: Standard         Precautions: None            Subjective: \"It's been really bad. It started over the weekend. \" The patient reports that he was able to work without any issue but then Friday or Saturday, it started acting up. \"I've actually been tempted to take a Tramadol every 6 hours. It's my hip and across the back. \" Aggravating activities: standing, sitting > 10 minutes. Hip piriformis stretch provides temporary relief. Heat pack helps back temporarily. Pain/Symptom Presentation:   Pain at rest: 7/10  Pain at worst: 8-9/10    Objective:     Treatment:    Therapeutic Exercise: x 18min   Hip PROM: hip IR/ER x 10 (B)   Prone quad stretch: x 30\" (B)  Cat/camel: 1 x 10   Manual hamstring stretch: x 30\"   Supine wipers: 1 x 10 (B)   S/L thoracic rotation: 1 x 10 (B)     Manual Therapy: x 22min  Lumbar PA accessory joint mobs - prone: L1-L5 - Grade 3, 4 x 10\" each  Thoracic PA accessory joint mobs - prone:T1-T12 - Grade 3, 2 x 10\" each  Soft tissue mobilization - prone: low back musculature and soft tissue - lumbar paraspinals, glutes, thoracolumbar fascia, QL     Provider Interactions With Patient:   Modified today's session based on increased pain complaints. Assessment: Ed arrives today with increased low back pain complaints as well as pain at his left hip and thigh. The patient's hip pain improved after joint mobilizations to the left hip. The patient was with TTP over the lumbar spine segments this date. His low back pain only minimally improved upon completion of his therapy session today.  Due to increased pain complaints, we did not complete a PN and will complete this at his next session. Goals:   Short-Term Goals:  1. Patient will improve low back and hip mobility as well as core strength to allow for intermittent pain-free forward bending to reach for and  objects up from the floor. Timeframe: 4 weeks. (MET 2/8/23)  2. Patient will improve low back and hip mobility to facilitate erect standing and walking after prolonged sitting of 2 hour(s). Timeframe: 4 weeks. (IN PROGRESS 2/8/23. Patient reports stiffness subsides within 20-30 minutes.)   Long-Term Goals:  1. Patient will improve low back mobility and postural endurance to maintain proper posture for prolonged standing of 2-3 hours for household activities and community integration. Timeframe: 8 weeks. (IN PROGRESS 2/8/23. Patient is able to stand 20-30 minutes.)   2. Patient will improve core strength and LE strength to facilitate lifting/pushing/pulling objects up to 50lbs for household chores and yardwork. Timeframe: 12 weeks. (TOMASA ET MET 2/8/23)  3. Patient will improve static and dynamic balance and foot/ankle stabilization to facilitate SLS for > 15 sec for safety with community ambulation. Timeframe: 12 weeks. (NOT YET MET 2/8/23)  4. Patient will improve low back pain and core endurance to facilitate walking distances of 2 miles daily to facilitate household ambulation and community ambulation. Timeframe: 12 weeks. (IN PROGRESS 2/8/23. Patient is able to walk 1 mile.)    Plan: Progress summary next session. HEP: Patient was issued a HEP handout 1/18/23 including cat/camel stretch, supine piriformis stretch, supine hip flexor stretch, 90-90 heel taps, S/L clamshell, and modified downward dog. Charges:  Therex x 1, MT x 2   Total Timed Treatment: 40min    Total Treatment Time: 40min

## 2023-04-27 ENCOUNTER — PATIENT MESSAGE (OUTPATIENT)
Dept: SURGERY | Facility: CLINIC | Age: 63
End: 2023-04-27

## 2023-04-27 NOTE — TELEPHONE ENCOUNTER
From: Padmini Snow. To: Juan Benitez MD  Sent: 4/27/2023 10:17 AM CDT  Subject: Increased pain in my left thigh and lower back    Dr. Lynnette Looney, I have been doing PT for several months and was seeing good improvement in strength and flexibility. Last week, out of the blue, I saw a very significant increase in pain and weakness in my left thigh and lower back along with a feeling of instability. I was talking to [facility] and he mentioned it might be a good idea to get a fresh MRI and see if anything has changed. I wanted to get your thoughts and opinion. Please advise. Thank you.

## 2023-04-27 NOTE — TELEPHONE ENCOUNTER
Patient was last seen on 8-25-22 by Dr Laureano Whitney. [facility] msg sent requesting patient make appt.

## 2023-04-28 ENCOUNTER — PATIENT MESSAGE (OUTPATIENT)
Dept: FAMILY MEDICINE CLINIC | Facility: CLINIC | Age: 63
End: 2023-04-28

## 2023-04-28 DIAGNOSIS — M54.16 LUMBAR RADICULOPATHY, ACUTE: Primary | ICD-10-CM

## 2023-04-28 NOTE — TELEPHONE ENCOUNTER
See first for follow up? Or refer? Please see pended neuro surgery referral and approve if appropriate.

## 2023-04-28 NOTE — TELEPHONE ENCOUNTER
From: Priscilla Morales. To: Jailene Staples MD  Sent: 4/28/2023 8:56 AM CDT  Subject: Referral to [facility]. I am experiencing increased pain in my left thigh and lower back. I have an appointment to Bita Zamora on next Thursday but need a referral sent over. I appreciate your help with this. Thank you.

## 2023-05-03 ENCOUNTER — OFFICE VISIT (OUTPATIENT)
Dept: PHYSICAL THERAPY | Age: 63
End: 2023-05-03
Attending: NEUROLOGICAL SURGERY
Payer: COMMERCIAL

## 2023-05-03 PROCEDURE — 97140 MANUAL THERAPY 1/> REGIONS: CPT

## 2023-05-03 PROCEDURE — 97110 THERAPEUTIC EXERCISES: CPT

## 2023-05-04 ENCOUNTER — OFFICE VISIT (OUTPATIENT)
Dept: SURGERY | Facility: CLINIC | Age: 63
End: 2023-05-04
Payer: COMMERCIAL

## 2023-05-04 VITALS
WEIGHT: 259 LBS | SYSTOLIC BLOOD PRESSURE: 132 MMHG | HEIGHT: 76 IN | HEART RATE: 96 BPM | BODY MASS INDEX: 31.54 KG/M2 | DIASTOLIC BLOOD PRESSURE: 58 MMHG

## 2023-05-04 DIAGNOSIS — M46.1 SACROILIITIS (HCC): ICD-10-CM

## 2023-05-04 DIAGNOSIS — M54.16 LUMBAR RADICULOPATHY, ACUTE: Primary | ICD-10-CM

## 2023-05-04 DIAGNOSIS — Z98.890 S/P LUMBAR MICRODISCECTOMY: ICD-10-CM

## 2023-05-04 DIAGNOSIS — R25.2 THIGH CRAMP: ICD-10-CM

## 2023-05-04 PROCEDURE — 3078F DIAST BP <80 MM HG: CPT | Performed by: NEUROLOGICAL SURGERY

## 2023-05-04 PROCEDURE — 3008F BODY MASS INDEX DOCD: CPT | Performed by: NEUROLOGICAL SURGERY

## 2023-05-04 PROCEDURE — 99212 OFFICE O/P EST SF 10 MIN: CPT | Performed by: NEUROLOGICAL SURGERY

## 2023-05-04 PROCEDURE — 3075F SYST BP GE 130 - 139MM HG: CPT | Performed by: NEUROLOGICAL SURGERY

## 2023-05-04 RX ORDER — ORPHENADRINE CITRATE 100 MG/1
100 TABLET, EXTENDED RELEASE ORAL 2 TIMES DAILY
Qty: 60 EACH | Refills: 1 | Status: SHIPPED | OUTPATIENT
Start: 2023-05-04 | End: 2023-06-03

## 2023-05-04 NOTE — PROGRESS NOTES
Established patient:  Reason for follow up: hip thigh and back pain. Had back surgery 2 years ago, pain is now returning. Numeric Rating Scale: (No Pain) 0  to  10 (Worst Pain)        Pain at Present:  8-9/10       Distribution of Pain:    left    Most recent treatments for Current Pain Condition:   NSAIDS tramadol  Response to treatment: some relief    New imaging or testing since your last office visit:  None recent.

## 2023-05-10 ENCOUNTER — OFFICE VISIT (OUTPATIENT)
Dept: PHYSICAL THERAPY | Age: 63
End: 2023-05-10
Attending: NEUROLOGICAL SURGERY
Payer: COMMERCIAL

## 2023-05-10 PROCEDURE — 97032 APPL MODALITY 1+ESTIM EA 15: CPT

## 2023-05-10 PROCEDURE — 97140 MANUAL THERAPY 1/> REGIONS: CPT

## 2023-05-15 DIAGNOSIS — I15.2 HYPERTENSION ASSOCIATED WITH DIABETES (HCC): ICD-10-CM

## 2023-05-15 DIAGNOSIS — E11.59 HYPERTENSION ASSOCIATED WITH DIABETES (HCC): ICD-10-CM

## 2023-05-15 RX ORDER — EMPAGLIFLOZIN 25 MG/1
TABLET, FILM COATED ORAL
Qty: 90 TABLET | Refills: 1 | Status: SHIPPED | OUTPATIENT
Start: 2023-05-15

## 2023-05-15 RX ORDER — LOSARTAN POTASSIUM 100 MG/1
TABLET ORAL
Qty: 90 TABLET | Refills: 1 | Status: SHIPPED | OUTPATIENT
Start: 2023-05-15

## 2023-05-22 ENCOUNTER — HOSPITAL ENCOUNTER (OUTPATIENT)
Dept: MRI IMAGING | Age: 63
Discharge: HOME | End: 2023-05-22
Attending: NURSE PRACTITIONER
Payer: COMMERCIAL

## 2023-05-22 DIAGNOSIS — M54.16 LUMBAR RADICULOPATHY, ACUTE: ICD-10-CM

## 2023-05-22 PROCEDURE — 72158 MRI LUMBAR SPINE W/O & W/DYE: CPT | Performed by: NURSE PRACTITIONER

## 2023-05-22 PROCEDURE — A9575 INJ GADOTERATE MEGLUMI 0.1ML: HCPCS

## 2023-05-22 RX ORDER — GADOTERATE MEGLUMINE 376.9 MG/ML
20 INJECTION INTRAVENOUS
Status: COMPLETED | OUTPATIENT
Start: 2023-05-22 | End: 2023-05-22

## 2023-05-22 RX ADMIN — GADOTERATE MEGLUMINE 20 ML: 376.9 INJECTION INTRAVENOUS at 15:45:00

## 2023-05-24 ENCOUNTER — OFFICE VISIT (OUTPATIENT)
Dept: PHYSICAL THERAPY | Age: 63
End: 2023-05-24
Attending: NEUROLOGICAL SURGERY
Payer: COMMERCIAL

## 2023-05-24 ENCOUNTER — PATIENT MESSAGE (OUTPATIENT)
Dept: SURGERY | Facility: CLINIC | Age: 63
End: 2023-05-24

## 2023-05-24 PROCEDURE — 97012 MECHANICAL TRACTION THERAPY: CPT

## 2023-05-24 PROCEDURE — 97140 MANUAL THERAPY 1/> REGIONS: CPT

## 2023-05-24 PROCEDURE — 97110 THERAPEUTIC EXERCISES: CPT

## 2023-05-24 NOTE — PROGRESS NOTES
Date of Service: 5/24/2023  Dx: Lumbar radiculopathy (M54.16)               Insurance: [employer] Limits: 60 visit limit, auth required  Visit #: 16  Authorized # of Visits: 27  POC/Auth Expiration: 2/28/2023  Date of Last PN: 5/10/23 (Visit #15)  Authorizing Physician/Provider: Nanci Solitario MD visit: N/A  Fall Risk: Standard         Precautions: None            Subjective: \"It's awful. It's in the left lower back, left hip joint, left groin, and down into the left leg. The only thing that makes it tolerable is that I'm hopped up on pain killers. \" The patient reports that he was really sore after his last appt. A heating pad down seem to give him some release. Pain/Symptom Presentation:   Pain at rest: 7/10   Pain at worst: 8-9/10     Objective:   SFMA Base Screen:   Multi-Segmental Flexion: DP  Multi-Segmental Extension: DP    Treatment:    Therapeutic Exercise: x 10min  Patient education: discussed MRI findings, medication regimen, possible pain management techniques for home  Prone quad stretch: x 30\" (B)   Manual hamstring stretch: x 30\" (B)   Hip PROM - IR/ER x 10     Manual Therapy: x 25min  Lumbar PA accessory joint mobs - prone: L1-L5 - Grade 3, 4 x 10\" each  Thoracic PA accessory joint mobs - prone:T1-T12 - Grade 3, 2 x 10\" each  Soft tissue mobilization - prone: low back musculature and soft tissue - lumbar paraspinals, glutes, thoracolumbar fascia, QL     Lumbar Mechanical Traction x 15min, 6 steps, 60lb max    Provider Interactions With Patient:   Discussed possible use on OTC anti-inflammatory for pain management. Assessment: Ed arrives today after his MRI which did have some significant findings at the L4 level. We reviewed the patient's medication regimen including tramadol, muscle relaxer, and gabapentin. The possibility of trying and OTC anti-inflammatory was suggested as a possible pain management technique.  The patient's pain doesn't seem to have a directional preference, with no changes noticed with lumbar extension or flexion-based positions. We tried lumbar mechanical traction this date which actually made the patient's pain worse. Goals:   Short-Term Goals:  1. Patient will improve low back and hip mobility as well as core strength to allow for intermittent pain-free forward bending to reach for and  objects up from the floor. Timeframe: 4 weeks. (MET 2/8/23)  2. Patient will improve low back and hip mobility to facilitate erect standing and walking after prolonged sitting of 2 hour(s). Timeframe: 4 weeks. (NOT YET MET 5/4/23)  Long-Term Goals:  1. Patient will improve low back mobility and postural endurance to maintain proper posture for prolonged standing of 2-3 hours for household activities and community integration. Timeframe: 8 weeks. (NOT YET MET 5/4/23)  2. Patient will improve core strength and LE strength to facilitate lifting/pushing/pulling objects up to 50lbs for household chores and yardwork. Timeframe: 12 weeks. (NOT YET MET 5/4/23)  3. Patient will improve static and dynamic balance and foot/ankle stabilization to facilitate SLS for > 15 sec for safety with community ambulation. Timeframe: 12 weeks. (NOT YET MET 5/4/23)  4. Patient will improve low back pain and core endurance to facilitate walking distances of 2 miles daily to facilitate household ambulation and community ambulation. Timeframe: 12 weeks. (NOT YET MET 5/4/23)    Plan: Continue to progress pain management. HEP: Patient was issued a HEP handout 1/18/23 including cat/camel stretch, supine piriformis stretch, supine hip flexor stretch, 90-90 heel taps, S/L clamshell, and modified downward dog.      Charges: MT x 2, Therex x 1, Traction x 1   Total Timed Treatment: 35min    Total Treatment Time: 50min

## 2023-05-25 NOTE — TELEPHONE ENCOUNTER
Called pt regarding message below, pt stated that he has received a call from the office & he's made an appt to see us next week Thursday 06/01/23.

## 2023-05-25 NOTE — TELEPHONE ENCOUNTER
Can p atient come in next Thursday?   I can see him at 2 pm.  Dr. Justine Madison is in clinic that day so I can pull him in if needed

## 2023-05-31 ENCOUNTER — OFFICE VISIT (OUTPATIENT)
Dept: PHYSICAL THERAPY | Age: 63
End: 2023-05-31
Attending: NEUROLOGICAL SURGERY
Payer: COMMERCIAL

## 2023-05-31 PROCEDURE — 97140 MANUAL THERAPY 1/> REGIONS: CPT

## 2023-05-31 PROCEDURE — 97110 THERAPEUTIC EXERCISES: CPT

## 2023-05-31 NOTE — PROGRESS NOTES
Date of Service: 5/31/2023  Dx: Lumbar radiculopathy (M54.16)               Insurance: [address] [place] [address] Limits: 60 visit limit, auth required  Visit #: 25  Authorized # of Visits: 27  POC/Auth Expiration: 2/28/2023  Date of Last PN: 5/10/23 (Visit #15)  Authorizing Physician/Provider: Nanci Solitario MD visit: N/A  Fall Risk: Standard         Precautions: None            Subjective: The patient reports that he has an appt with his doctor tomorrow. He \"feels like crap. \"     Pain/Symptom Presentation:   Pain at rest: 7/10   Pain at worst: 8-9/10     Objective:   SFMA Base Screen:   Multi-Segmental Flexion: DP  Multi-Segmental Extension: DP    Treatment:    Therapeutic Exercise: x 10min  Prone quad stretch: x 30\" (B)   Manual hamstring stretch: x 30\" (B)   Hip PROM - IR/ER x 10     Manual Therapy: x 20min  Lumbar PA accessory joint mobs - prone: L1-L5 - Grade 3, 4 x 10\" each  Thoracic PA accessory joint mobs - prone:T1-T12 - Grade 3, 2 x 10\" each  Soft tissue mobilization - prone: low back musculature and soft tissue - lumbar paraspinals, glutes, thoracolumbar fascia, QL     Assessment: Ed is with remaining and similar pain complaints and ratings. The patient is to follow up with his doctor tomorrow to discuss MRI results. The patient is anticipating needing surgery. We will await further instructions from MD on the next step in treatment POC. Goals:   Short-Term Goals:  1. Patient will improve low back and hip mobility as well as core strength to allow for intermittent pain-free forward bending to reach for and  objects up from the floor. Timeframe: 4 weeks. (MET 2/8/23)  2. Patient will improve low back and hip mobility to facilitate erect standing and walking after prolonged sitting of 2 hour(s). Timeframe: 4 weeks. (NOT YET MET 5/4/23)  Long-Term Goals:  1.  Patient will improve low back mobility and postural endurance to maintain proper posture for prolonged standing of 2-3 hours for household activities and community integration. Timeframe: 8 weeks. (NOT YET MET 5/4/23)  2. Patient will improve core strength and LE strength to facilitate lifting/pushing/pulling objects up to 50lbs for household chores and yardwork. Timeframe: 12 weeks. (NOT YET MET 5/4/23)  3. Patient will improve static and dynamic balance and foot/ankle stabilization to facilitate SLS for > 15 sec for safety with community ambulation. Timeframe: 12 weeks. (NOT YET MET 5/4/23)  4. Patient will improve low back pain and core endurance to facilitate walking distances of 2 miles daily to facilitate household ambulation and community ambulation. Timeframe: 12 weeks. (NOT YET MET 5/4/23)    Plan: Follow up on MD appt. HEP: Patient was issued a HEP handout 1/18/23 including cat/camel stretch, supine piriformis stretch, supine hip flexor stretch, 90-90 heel taps, S/L clamshell, and modified downward dog.      Charges: MT x 1, Therex x 1  Total Timed Treatment: 35min    Total Treatment Time: 35min

## 2023-06-01 ENCOUNTER — OFFICE VISIT (OUTPATIENT)
Dept: SURGERY | Facility: CLINIC | Age: 63
End: 2023-06-01
Payer: COMMERCIAL

## 2023-06-01 VITALS
DIASTOLIC BLOOD PRESSURE: 82 MMHG | BODY MASS INDEX: 31.66 KG/M2 | SYSTOLIC BLOOD PRESSURE: 134 MMHG | WEIGHT: 260 LBS | HEART RATE: 69 BPM | HEIGHT: 76 IN

## 2023-06-01 DIAGNOSIS — R29.898 LEFT LEG WEAKNESS: ICD-10-CM

## 2023-06-01 DIAGNOSIS — M54.16 LUMBAR RADICULOPATHY, ACUTE: Primary | ICD-10-CM

## 2023-06-01 DIAGNOSIS — M47.816 FACET ARTHROPATHY, LUMBAR: ICD-10-CM

## 2023-06-01 DIAGNOSIS — Z98.890 S/P LUMBAR MICRODISCECTOMY: ICD-10-CM

## 2023-06-01 PROCEDURE — 99213 OFFICE O/P EST LOW 20 MIN: CPT | Performed by: NURSE PRACTITIONER

## 2023-06-01 PROCEDURE — 3008F BODY MASS INDEX DOCD: CPT | Performed by: NURSE PRACTITIONER

## 2023-06-01 PROCEDURE — 3079F DIAST BP 80-89 MM HG: CPT | Performed by: NURSE PRACTITIONER

## 2023-06-01 PROCEDURE — 3075F SYST BP GE 130 - 139MM HG: CPT | Performed by: NURSE PRACTITIONER

## 2023-06-01 NOTE — PROGRESS NOTES
Established patient:  Reason for follow up:   Lumbar Spine    Numeric Rating Scale:     Pain at Present:  3/10 at rest, increased with activity or distance walking    Distribution of Pain:    left lower back with radiation down to left thigh area    Most recent treatments for Current Pain Condition:  Tramadol, Muscle Relaxant   Response to treatment: some relief    New imaging or testing since your last office visit:      5/22/2023--  MRI Spine Lumbar

## 2023-06-02 ENCOUNTER — PATIENT MESSAGE (OUTPATIENT)
Dept: SURGERY | Facility: CLINIC | Age: 63
End: 2023-06-02

## 2023-06-02 NOTE — TELEPHONE ENCOUNTER
Shady messaged pt, informed him that he should wait a few days before/after the injection to get the x-ray done.

## 2023-06-06 ENCOUNTER — HOSPITAL ENCOUNTER (OUTPATIENT)
Dept: GENERAL RADIOLOGY | Age: 63
Discharge: HOME | End: 2023-06-06
Attending: NURSE PRACTITIONER
Payer: COMMERCIAL

## 2023-06-06 DIAGNOSIS — R29.898 LEFT LEG WEAKNESS: ICD-10-CM

## 2023-06-06 DIAGNOSIS — Z98.890 S/P LUMBAR MICRODISCECTOMY: ICD-10-CM

## 2023-06-06 DIAGNOSIS — M54.16 LUMBAR RADICULOPATHY, ACUTE: ICD-10-CM

## 2023-06-06 PROCEDURE — 72114 X-RAY EXAM L-S SPINE BENDING: CPT | Performed by: NURSE PRACTITIONER

## 2023-06-07 ENCOUNTER — HOSPITAL ENCOUNTER (OUTPATIENT)
Dept: CV DIAGNOSTICS | Facility: HOSPITAL | Age: 63
Discharge: HOME | End: 2023-06-07
Attending: FAMILY MEDICINE
Payer: COMMERCIAL

## 2023-06-07 DIAGNOSIS — R00.2 PALPITATIONS: ICD-10-CM

## 2023-06-07 PROCEDURE — 93246 EXT ECG>7D<15D RECORDING: CPT | Performed by: FAMILY MEDICINE

## 2023-06-07 PROCEDURE — 93247 EXT ECG>7D<15D SCAN A/R: CPT | Performed by: FAMILY MEDICINE

## 2023-06-12 ENCOUNTER — TELEPHONE (OUTPATIENT)
Dept: FAMILY MEDICINE CLINIC | Facility: CLINIC | Age: 63
End: 2023-06-12

## 2023-06-12 DIAGNOSIS — E11.40 TYPE 2 DIABETES MELLITUS WITH DIABETIC NEUROPATHY, WITHOUT LONG-TERM CURRENT USE OF INSULIN (HCC): ICD-10-CM

## 2023-06-12 DIAGNOSIS — E11.42 DIABETIC POLYNEUROPATHY ASSOCIATED WITH TYPE 2 DIABETES MELLITUS (HCC): ICD-10-CM

## 2023-06-16 ENCOUNTER — OFFICE VISIT (OUTPATIENT)
Dept: PAIN CLINIC | Facility: CLINIC | Age: 63
End: 2023-06-16
Payer: COMMERCIAL

## 2023-06-16 VITALS — DIASTOLIC BLOOD PRESSURE: 70 MMHG | SYSTOLIC BLOOD PRESSURE: 140 MMHG | OXYGEN SATURATION: 100 % | HEART RATE: 76 BPM

## 2023-06-16 DIAGNOSIS — M54.16 LUMBAR RADICULOPATHY: Primary | ICD-10-CM

## 2023-06-16 PROCEDURE — 3077F SYST BP >= 140 MM HG: CPT | Performed by: ANESTHESIOLOGY

## 2023-06-16 PROCEDURE — 3078F DIAST BP <80 MM HG: CPT | Performed by: ANESTHESIOLOGY

## 2023-06-16 PROCEDURE — 99204 OFFICE O/P NEW MOD 45 MIN: CPT | Performed by: ANESTHESIOLOGY

## 2023-06-20 ENCOUNTER — TELEPHONE (OUTPATIENT)
Dept: PAIN CLINIC | Facility: CLINIC | Age: 63
End: 2023-06-20

## 2023-06-20 DIAGNOSIS — M54.16 LUMBAR RADICULITIS: Primary | ICD-10-CM

## 2023-06-20 NOTE — TELEPHONE ENCOUNTER
Question Answer   Anesthesia Type Local   Provider [facility] Procedure Lab   Procedure Transforaminal   Laterality/Level left L4   CPT (Hit enter after each entry) INJECTION, ANESTHETIC/STEROID, TRANSFORAMINAL EPIDURAL; LUMBAR/SACRAL, SINGLE LEVEL   Medical clearance requested (will send to Pain Navigator) No   Patient has Medicare coverage?  No   Comments (Please list entire procedure name here.) left lumbar 4 transforaminal epidural steroid injection

## 2023-06-22 ENCOUNTER — PATIENT MESSAGE (OUTPATIENT)
Dept: SURGERY | Facility: CLINIC | Age: 63
End: 2023-06-22

## 2023-06-22 NOTE — TELEPHONE ENCOUNTER
From: Jessi Baiely. To: CARON Desir  Sent: 6/22/2023 9:10 AM CDT  Subject: My status    Sonia, I have an appointment with Dr. Sid Sue on the 11th for an epidural. Since I last saw you something has changed. The pain in my lower back and left thigh has increased and there has been an increase with my urination frequency. I feel fine waiting for my appt. with Dr. Sid Sue. I just wanted to alert you and Dr. Brie Ackerman so you were aware. Let me know if you need any additional info. Thank you.

## 2023-06-26 NOTE — TELEPHONE ENCOUNTER
Noted that patient has messaged with CARON Velazco directly and provided a condition update, replying to her message. Patient states he prefers to wait to see neurosurgery provider until after he sees Dr. Valaria Nageotte. Response sent informing patient that provider will be notified of condition update and patient's concerns.

## 2023-07-03 RX ORDER — ATORVASTATIN CALCIUM 10 MG/1
10 TABLET, FILM COATED ORAL NIGHTLY
Qty: 90 TABLET | Refills: 0 | Status: SHIPPED | OUTPATIENT
Start: 2023-07-03

## 2023-07-03 RX ORDER — ORPHENADRINE CITRATE 100 MG/1
100 TABLET, EXTENDED RELEASE ORAL 2 TIMES DAILY
Qty: 120 TABLET | Refills: 0 | Status: SHIPPED | OUTPATIENT
Start: 2023-07-03

## 2023-07-11 ENCOUNTER — APPOINTMENT (OUTPATIENT)
Dept: GENERAL RADIOLOGY | Facility: HOSPITAL | Age: 63
End: 2023-07-11
Attending: ANESTHESIOLOGY
Payer: COMMERCIAL

## 2023-07-11 ENCOUNTER — LAB ENCOUNTER (OUTPATIENT)
Dept: LAB | Age: 63
End: 2023-07-11
Attending: FAMILY MEDICINE
Payer: COMMERCIAL

## 2023-07-11 ENCOUNTER — HOSPITAL ENCOUNTER (OUTPATIENT)
Facility: HOSPITAL | Age: 63
Setting detail: HOSPITAL OUTPATIENT SURGERY
Discharge: HOME | End: 2023-07-11
Attending: ANESTHESIOLOGY | Admitting: ANESTHESIOLOGY
Payer: COMMERCIAL

## 2023-07-11 VITALS
WEIGHT: 260 LBS | DIASTOLIC BLOOD PRESSURE: 64 MMHG | HEIGHT: 76 IN | TEMPERATURE: 98 F | OXYGEN SATURATION: 99 % | BODY MASS INDEX: 31.66 KG/M2 | RESPIRATION RATE: 18 BRPM | HEART RATE: 81 BPM | SYSTOLIC BLOOD PRESSURE: 112 MMHG

## 2023-07-11 DIAGNOSIS — E78.00 PURE HYPERCHOLESTEROLEMIA: ICD-10-CM

## 2023-07-11 DIAGNOSIS — M54.16 LUMBAR RADICULOPATHY, ACUTE: ICD-10-CM

## 2023-07-11 DIAGNOSIS — E11.40 TYPE 2 DIABETES MELLITUS WITH DIABETIC NEUROPATHY, WITHOUT LONG-TERM CURRENT USE OF INSULIN (HCC): ICD-10-CM

## 2023-07-11 DIAGNOSIS — Z12.5 SPECIAL SCREENING FOR MALIGNANT NEOPLASM OF PROSTATE: ICD-10-CM

## 2023-07-11 DIAGNOSIS — E08.42 DIABETIC POLYNEUROPATHY ASSOCIATED WITH DIABETES MELLITUS DUE TO UNDERLYING CONDITION (HCC): ICD-10-CM

## 2023-07-11 DIAGNOSIS — I15.2 HYPERTENSION ASSOCIATED WITH DIABETES: ICD-10-CM

## 2023-07-11 DIAGNOSIS — E66.9 OBESITY (BMI 30.0-34.9): ICD-10-CM

## 2023-07-11 DIAGNOSIS — E11.59 HYPERTENSION ASSOCIATED WITH DIABETES: ICD-10-CM

## 2023-07-11 LAB
ALBUMIN SERPL-MCNC: 4.1 G/DL (ref 3.4–5)
ALBUMIN/GLOB SERPL: 1.3 {RATIO} (ref 1–2)
ALP LIVER SERPL-CCNC: 75 U/L
ALT SERPL-CCNC: 18 U/L
ANION GAP SERPL CALC-SCNC: 4 MMOL/L (ref 0–18)
AST SERPL-CCNC: 15 U/L (ref 15–37)
BASOPHILS # BLD AUTO: 0.05 X10(3) UL (ref 0–0.2)
BASOPHILS NFR BLD AUTO: 0.6 %
BILIRUB SERPL-MCNC: 0.3 MG/DL (ref 0.1–2)
BUN BLD-MCNC: 26 MG/DL (ref 7–18)
CALCIUM BLD-MCNC: 9.1 MG/DL (ref 8.5–10.1)
CHLORIDE SERPL-SCNC: 105 MMOL/L (ref 98–112)
CHOLEST SERPL-MCNC: 125 MG/DL (ref ?–200)
CO2 SERPL-SCNC: 26 MMOL/L (ref 21–32)
CREAT BLD-MCNC: 1.22 MG/DL
CREAT UR-SCNC: 89.8 MG/DL
EOSINOPHIL # BLD AUTO: 0.25 X10(3) UL (ref 0–0.7)
EOSINOPHIL NFR BLD AUTO: 2.9 %
ERYTHROCYTE [DISTWIDTH] IN BLOOD BY AUTOMATED COUNT: 13.6 %
EST. AVERAGE GLUCOSE BLD GHB EST-MCNC: 163 MG/DL (ref 68–126)
FASTING PATIENT LIPID ANSWER: YES
FASTING STATUS PATIENT QL REPORTED: YES
GFR SERPLBLD BASED ON 1.73 SQ M-ARVRAT: 66 ML/MIN/1.73M2 (ref 60–?)
GLOBULIN PLAS-MCNC: 3.1 G/DL (ref 2.8–4.4)
GLUCOSE BLD-MCNC: 105 MG/DL (ref 70–99)
GLUCOSE BLD-MCNC: 130 MG/DL (ref 70–99)
HBA1C MFR BLD: 7.3 % (ref ?–5.7)
HCT VFR BLD AUTO: 44.4 %
HDLC SERPL-MCNC: 44 MG/DL (ref 40–59)
HGB BLD-MCNC: 14.1 G/DL
IMM GRANULOCYTES # BLD AUTO: 0.03 X10(3) UL (ref 0–1)
IMM GRANULOCYTES NFR BLD: 0.4 %
LDLC SERPL CALC-MCNC: 54 MG/DL (ref ?–100)
LYMPHOCYTES # BLD AUTO: 1.56 X10(3) UL (ref 1–4)
LYMPHOCYTES NFR BLD AUTO: 18.4 %
MCH RBC QN AUTO: 29.7 PG (ref 26–34)
MCHC RBC AUTO-ENTMCNC: 31.8 G/DL (ref 31–37)
MCV RBC AUTO: 93.5 FL
MICROALBUMIN UR-MCNC: 0.64 MG/DL
MICROALBUMIN/CREAT 24H UR-RTO: 7.1 UG/MG (ref ?–30)
MONOCYTES # BLD AUTO: 0.57 X10(3) UL (ref 0.1–1)
MONOCYTES NFR BLD AUTO: 6.7 %
NEUTROPHILS # BLD AUTO: 6.02 X10 (3) UL (ref 1.5–7.7)
NEUTROPHILS # BLD AUTO: 6.02 X10(3) UL (ref 1.5–7.7)
NEUTROPHILS NFR BLD AUTO: 71 %
NONHDLC SERPL-MCNC: 81 MG/DL (ref ?–130)
OSMOLALITY SERPL CALC.SUM OF ELEC: 287 MOSM/KG (ref 275–295)
PLATELET # BLD AUTO: 261 10(3)UL (ref 150–450)
POTASSIUM SERPL-SCNC: 4.2 MMOL/L (ref 3.5–5.1)
PROT SERPL-MCNC: 7.2 G/DL (ref 6.4–8.2)
PSA SERPL-MCNC: 1.52 NG/ML (ref ?–4)
RBC # BLD AUTO: 4.75 X10(6)UL
SODIUM SERPL-SCNC: 135 MMOL/L (ref 136–145)
TRIGL SERPL-MCNC: 163 MG/DL (ref 30–149)
VLDLC SERPL CALC-MCNC: 24 MG/DL (ref 0–30)
WBC # BLD AUTO: 8.5 X10(3) UL (ref 4–11)

## 2023-07-11 PROCEDURE — 84153 ASSAY OF PSA TOTAL: CPT

## 2023-07-11 PROCEDURE — 3051F HG A1C>EQUAL 7.0%<8.0%: CPT | Performed by: NURSE PRACTITIONER

## 2023-07-11 PROCEDURE — 3061F NEG MICROALBUMINURIA REV: CPT | Performed by: NURSE PRACTITIONER

## 2023-07-11 PROCEDURE — 80053 COMPREHEN METABOLIC PANEL: CPT

## 2023-07-11 PROCEDURE — 80061 LIPID PANEL: CPT

## 2023-07-11 PROCEDURE — 83036 HEMOGLOBIN GLYCOSYLATED A1C: CPT

## 2023-07-11 PROCEDURE — 3E0S33Z INTRODUCTION OF ANTI-INFLAMMATORY INTO EPIDURAL SPACE, PERCUTANEOUS APPROACH: ICD-10-PCS | Performed by: ANESTHESIOLOGY

## 2023-07-11 PROCEDURE — 82570 ASSAY OF URINE CREATININE: CPT

## 2023-07-11 PROCEDURE — 82043 UR ALBUMIN QUANTITATIVE: CPT

## 2023-07-11 PROCEDURE — 85025 COMPLETE CBC W/AUTO DIFF WBC: CPT

## 2023-07-11 PROCEDURE — 64483 NJX AA&/STRD TFRM EPI L/S 1: CPT | Performed by: ANESTHESIOLOGY

## 2023-07-11 PROCEDURE — 36415 COLL VENOUS BLD VENIPUNCTURE: CPT

## 2023-07-11 RX ORDER — DEXAMETHASONE SODIUM PHOSPHATE 10 MG/ML
INJECTION, SOLUTION INTRAMUSCULAR; INTRAVENOUS
Status: DISCONTINUED | OUTPATIENT
Start: 2023-07-11 | End: 2023-07-11

## 2023-07-11 RX ORDER — SODIUM CHLORIDE 9 MG/ML
INJECTION INTRAVENOUS
Status: DISCONTINUED | OUTPATIENT
Start: 2023-07-11 | End: 2023-07-11

## 2023-07-11 RX ORDER — ONDANSETRON 2 MG/ML
4 INJECTION INTRAMUSCULAR; INTRAVENOUS ONCE AS NEEDED
Status: CANCELLED | OUTPATIENT
Start: 2023-07-11 | End: 2023-07-11

## 2023-07-11 RX ORDER — ONDANSETRON 2 MG/ML
4 INJECTION INTRAMUSCULAR; INTRAVENOUS ONCE AS NEEDED
Status: DISCONTINUED | OUTPATIENT
Start: 2023-07-11 | End: 2023-07-11

## 2023-07-11 RX ORDER — NICOTINE POLACRILEX 4 MG
30 LOZENGE BUCCAL
Status: DISCONTINUED | OUTPATIENT
Start: 2023-07-11 | End: 2023-07-11

## 2023-07-11 RX ORDER — INSULIN ASPART 100 [IU]/ML
3 INJECTION, SOLUTION INTRAVENOUS; SUBCUTANEOUS ONCE
Status: DISCONTINUED | OUTPATIENT
Start: 2023-07-11 | End: 2023-07-11

## 2023-07-11 RX ORDER — LIDOCAINE HYDROCHLORIDE 10 MG/ML
INJECTION, SOLUTION EPIDURAL; INFILTRATION; INTRACAUDAL; PERINEURAL
Status: DISCONTINUED | OUTPATIENT
Start: 2023-07-11 | End: 2023-07-11

## 2023-07-11 RX ORDER — NICOTINE POLACRILEX 4 MG
15 LOZENGE BUCCAL
Status: DISCONTINUED | OUTPATIENT
Start: 2023-07-11 | End: 2023-07-11

## 2023-07-11 RX ORDER — SODIUM CHLORIDE, SODIUM LACTATE, POTASSIUM CHLORIDE, CALCIUM CHLORIDE 600; 310; 30; 20 MG/100ML; MG/100ML; MG/100ML; MG/100ML
100 INJECTION, SOLUTION INTRAVENOUS CONTINUOUS
Status: CANCELLED | OUTPATIENT
Start: 2023-07-11

## 2023-07-11 RX ORDER — DIPHENHYDRAMINE HYDROCHLORIDE 50 MG/ML
50 INJECTION INTRAMUSCULAR; INTRAVENOUS ONCE AS NEEDED
Status: DISCONTINUED | OUTPATIENT
Start: 2023-07-11 | End: 2023-07-11

## 2023-07-11 RX ORDER — DEXTROSE MONOHYDRATE 25 G/50ML
50 INJECTION, SOLUTION INTRAVENOUS
Status: DISCONTINUED | OUTPATIENT
Start: 2023-07-11 | End: 2023-07-11

## 2023-07-11 NOTE — OPERATIVE REPORT
[facility]  Operative Report  2023     Joaquin Riley Jr. Patient Status:  Hospital Outpatient Surgery    1960 MRN [id number]   Location [id number] [place] [address] Attending Patricio Vallejo MD   [facility] Day # 0 [facility] Young Maria MD     Indication: Sole Slade is a 57year old male with lumbar radiculitis    Preoperative Diagnosis:  Lumbar radiculitis [M54.16]    Postoperative Diagnosis: Same as above. Procedure performed: LEFT LUMBAR 4 TRANSFORAMINAL EPIDURAL STEROID INJECTION SINGLE LEVEL with local     Anesthesia: Local      EBL: Less than 1 ml. Procedure Description:   After reviewing the patient's history and performing a focused physical examination, the diagnosis was confirmed and contraindications such as infection and coagulopathy were ruled out. Following review of potential side effects and complications, including but not necessarily limited to infection, allergic reaction, local tissue breakdown, nerve injury, and paresis, the patient indicated they understood and agreed to proceed. After obtaining the informed consent, the patient was brought to the procedure room and monitored. In the prone position, following sterile prep and drape of the lumbar region, the L4 neural foramen was identified under fluoroscopy. The skin and subcutaneous tissue was anesthetized via 25-gauge 1.5\" needle with approximately 2 cc of 1% lidocaine. A 22-gauge 5\" Quincke spinal needle was introduced toward the inferior aspect of the junction between the transverse process and pedicle of the L4 level atraumatically under fluoroscopic guidance. The needle was advanced into the anterior epidural space at this level. The needle position was confirmed under AP and lateral fluoroscopic view. Following negative aspiration for CSF and blood, approximately 1 cc of Omnipaque 240 was injected. An excellent contrast spread along the epidural space and the nerve root was obtained.   At this point, 2 cc of normal saline with 10 mg of dexamethasone was injected without complication. The needle was withdrawn with stylet in situ. The patient tolerated procedure very well. The patient was observed until discharge criteria met. Discharge instructions were given and patient was released to a responsible adult. Complications: None. Follow up: The patient was followed in the pain clinic as needed basis.       Dougie Crespo MD

## 2023-07-11 NOTE — H&P
History & Physical Examination    Patient Name: Elias Rich. MRN: [id number]  CSN: [id number]  YOB: 1960    Pre-Operative Diagnosis:  Lumbar radiculitis [M54.16]    Present Illness: Patient with lumbar radiculopathy    orphenadrine  MG Oral Tablet 12 Hr, Take 100 mg by mouth 2 (two) times daily. , Disp: 120 tablet, Rfl: 0, 7/11/2023 at 1100  atorvastatin 10 MG Oral Tab, Take 1 tablet (10 mg total) by mouth nightly., Disp: 90 tablet, Rfl: 0, 7/11/2023  metFORMIN HCl 1000 MG Oral Tab, Take 1 tablet (1,000 mg total) by mouth 2 (two) times daily with meals. , Disp: 180 tablet, Rfl: 0, 7/11/2023 at 1100  JARDIANCE 25 MG Oral Tab, TAKE 1 TABLET BY MOUTH DAILY, Disp: 90 tablet, Rfl: 1, 7/11/2023 at 1030  LOSARTAN 100 MG Oral Tab, TAKE 1 TABLET BY MOUTH DAILY, Disp: 90 tablet, Rfl: 1, 7/10/2023  Liraglutide (VICTOZA) 18 MG/3ML Subcutaneous Solution Pen-injector, Inject 1.8 mg under the skin daily, Disp: 27 mL, Rfl: 1, 7/10/2023  gabapentin 300 MG Oral Cap, Take 1 capsule (300 mg total) by mouth in the morning and 1 capsule (300 mg total) at noon and 1 capsule (300 mg total) in the evening., Disp: 270 capsule, Rfl: 1, 7/11/2023 at 1030  aspirin 81 MG Oral Tab EC, Take 1 tablet (81 mg total) by mouth daily. , Disp: , Rfl: , 7/10/2023 at 0800  FAMOTIDINE OR, Take 1 tablet by mouth daily as needed. , Disp: , Rfl: , 7/11/2023 at 0700  traMADol 50 MG Oral Tab, Take 1 tablet (50 mg total) by mouth every 6 (six) hours as needed for Pain. (Patient taking differently: Take 1 tablet (50 mg total) by mouth every 6 (six) hours as needed for Pain. Pt takes very rarely), Disp: 40 tablet, Rfl: 0, 7/8/2023  BD PEN NEEDLE KATY 2ND GEN 32G X 4 MM Does not apply Misc, USE 1 DAILY, Disp: 100 each, Rfl: 3  cyclobenzaprine 10 MG Oral Tab, Take 1 tablet (10 mg total) by mouth 3 (three) times daily as needed for Muscle spasms.  (Patient not taking: Reported on 6/16/2023), Disp: 60 tablet, Rfl: 1, More than a month  Glucose Blood (CONTOUR NEXT TEST) In Vitro Strip, Pt testing bid Pt uses contour next meter, Disp: 200 each, Rfl: 3  Alcohol Swabs (ALCOHOL PREP) Does not apply Pads, Use daily with victoza, Disp: 90 each, Rfl: 3      insulin aspart (NovoLOG) 100 Units/mL vial 3 Units, 3 Units, Subcutaneous, Once  glucose (Dex4) 15 GM/59ML oral liquid 15 g, 15 g, Oral, Q15 Min PRN   Or  glucose (Glutose) 40% oral gel 15 g, 15 g, Oral, Q15 Min PRN   Or  glucose-vitamin C (Dex-4) chewable tab 4 tablet, 4 tablet, Oral, Q15 Min PRN   Or  dextrose 50% injection 50 mL, 50 mL, Intravenous, Q15 Min PRN   Or  glucose (Dex4) 15 GM/59ML oral liquid 30 g, 30 g, Oral, Q15 Min PRN   Or  glucose (Glutose) 40% oral gel 30 g, 30 g, Oral, Q15 Min PRN   Or  glucose-vitamin C (Dex-4) chewable tab 8 tablet, 8 tablet, Oral, Q15 Min PRN        Allergies: No Known Allergies    Past Medical History:   Diagnosis Date    Belching     Occasionally    Bloating     Occasionally    Constipation     Occasionally    Diabetic peripheral neuropathy (HCC)     Esophageal reflux     Flatulence/gas pain/belching     Occasionally    Frequent use of laxatives     Occasionally    Hearing loss 1-1-2019    Age related    Heartburn     Occasionally    High blood pressure     Indigestion     Occasionally    Neuropathy     Sleep apnea     wears CPAP    Type II or unspecified type diabetes mellitus without mention of complication, not stated as uncontrolled     Unspecified essential hypertension     Visual impairment     Wears glasses 1-1-1990    Readers    Weight gain April 2020    Lock down     Past Surgical History:   Procedure Laterality Date    APPENDECTOMY      COLONOSCOPY      COLONOSCOPY  2015    OTHER      Right big toe partially amputated    SIGMOIDOSCOPY,DIAGNOSTIC  1989    VASECTOMY  18 years ago     Family History   Problem Relation Age of Onset    Cancer Father         colon    Colon Cancer Father         Cause of death    Other (Other) Mother alcoholism    Alcohol and Other Disorders Associated Mother         Contributed to her death in 18     Social History    Tobacco Use      Smoking status: Former        Packs/day: 0.50        Years: 38.00        Pack years: 23        Types: Cigarettes        Quit date: 9/20/2019        Years since quitting: 3.8      Smokeless tobacco: Never      Tobacco comments: 11/2019    Alcohol use: Yes      Alcohol/week: 1.0 - 2.0 standard drink of alcohol      Types: 1 Glasses of wine per week      Comment: Occasionally, socially      SYSTEM Check if Review is Normal Check if Physical Exam is Normal If not normal, please explain:   HEENT [x ] [x ]    NECK & BACK [x ] [x ]    HEART [x ] [x ]    LUNGS [x ] [x ]    ABDOMEN [x ] [x ]    UROGENITAL [x ] [x ]    EXTREMITIES [x ] [x ]    OTHER        [ x ] I have discussed the risks and benefits and alternatives with the patient/family. They understand and agree to proceed with plan of care. [ x ] I have reviewed the History and Physical done within the last 30 days. Any changes noted above.     Vj Ascencio MD

## 2023-07-11 NOTE — DISCHARGE INSTRUCTIONS
Home Care Instructions Following Your Pain Procedure     Ruth Esposito,  It has been a pleasure to have you as our patient. To help you at home, you must follow these general discharge instructions. We will review these with you before you are discharged. It is our hope that you have a complete and uneventful recovery from our procedure. General Instructions:  What to Expect:  Bandages from your procedure today can be removed when you get home. Please avoid soaking and/or swimming for 24 hours. Showering is okay  It is normal to have increased pain symptoms and/or pain at injection site for up to 3-5 days after procedure, you can use heat or ice (20 minutes on 20 minutes off) for comfort. You may experience some temporary side effects which may include restlessness or insomnia, flushing of the face, or heart palpitations. Please contact the provider if these symptoms do not resolve within 3-4 days. Lightheadedness or nausea may occur and should resolve within 24 to 48 hours. If you develop a headache after treatment, rest, drink fluids (with caffeine, if possible) and take mild over-the-counter pain medication. If the headache does not improve with the above treatment, contact the physician. Home Medications:  Resume all previously prescribed medication. Please avoid taking NSAIDs (Non-Steriodal Anti-Inflammatory Drugs) such as:  Ibuprofen ( Advil, Motrin) Aleve (Naproxen), Diclofenac, Meloxicam for 6 hours after procedure. If you are on Coumadin (Warfarin) or any other anti-coagulant (or \"blood thinning\") medication such as Plavix (Clopidogrel), Xarelto (Rivaroxaban), Eliquis (Apixaban), Effient (Prasugrel) etc., restart on the following day from the procedure unless otherwise directed by your provider. If you are a diabetic, please increase the frequency of your glucose monitoring after the procedure as steroids may cause a temporary (2-3 day) increase in your blood sugar.   Contact your primary care physician if your blood sugar remains elevated as you may require some medication adjustment. Diet:  Resume your regular diet as tolerated. Activity: We recommend that you relax and rest during the rest of your procedure day. If you feel weakness in your arms or legs do not drive. Follow-up Appointment  Please schedule a follow-up visit within 3 to 4 weeks after your last procedure date. Question or Concerns:  Feel free to call our office with any questions or concerns at [phone] (option #2)    Bambi Rondon  Thank you for coming to [facility] for your procedure. The nurses try very hard to make sure you receive the best care possible. Your trust in them as well as us is greatly appreciated.     Thanks so much,   Dr. Lake Abebe

## 2023-07-11 NOTE — PROGRESS NOTES
Have patient make appointment for follow up.   Okay to see [facility] ORLY REYES or Dr. Jamal Durham

## 2023-07-12 ENCOUNTER — TELEPHONE (OUTPATIENT)
Dept: PAIN CLINIC | Facility: CLINIC | Age: 63
End: 2023-07-12

## 2023-07-12 NOTE — TELEPHONE ENCOUNTER
Ruthyesy called placed to patient for post procedure follow up. Patient stated he is doing well. Felt uncomfortable the first hour or so, but since then has been feeling fine. Pt will create a FU after seeing Dr. Abdelrahman Cardona. Pt verbalized understanding to call with any questions or concerns.       Procedure:   LEFT LUMBAR 4 TRANSFORAMINAL EPIDURAL STEROID INJECTION SINGLE LEVEL with local     Date: 7/11/23  Follow up Visit Scheduled: LINDA No Residual Tumor Seen Histology Text: There were no malignant cells seen in the sections examined.

## 2023-07-20 ENCOUNTER — APPOINTMENT (OUTPATIENT)
Dept: PHYSICAL THERAPY | Age: 63
End: 2023-07-20
Attending: NEUROLOGICAL SURGERY
Payer: COMMERCIAL

## 2023-07-24 ENCOUNTER — APPOINTMENT (OUTPATIENT)
Dept: PHYSICAL THERAPY | Age: 63
End: 2023-07-24
Attending: NEUROLOGICAL SURGERY
Payer: COMMERCIAL

## 2023-07-26 ENCOUNTER — OFFICE VISIT (OUTPATIENT)
Dept: PHYSICAL THERAPY | Age: 63
End: 2023-07-26
Attending: NEUROLOGICAL SURGERY
Payer: COMMERCIAL

## 2023-07-26 PROCEDURE — 97140 MANUAL THERAPY 1/> REGIONS: CPT

## 2023-07-26 PROCEDURE — 97110 THERAPEUTIC EXERCISES: CPT

## 2023-07-26 NOTE — PROGRESS NOTES
PHYSICAL THERAPY RE-EVALUATION:      Date of Service: 7/26/2023  Dx: Lumbar radiculopathy (M54.16)               Insurance: [id number] Limits: 60 visit limit, auth required  Visit #: 23  Authorized # of Visits: 27  POC/Auth Expiration: 2/28/2023  Date of Last PN: 5/10/23 (Visit #15)  Authorizing Physician/Provider: Brayan Soliatrio MD visit: N/A  Fall Risk: Standard         Precautions: None            Subjective: \"Since I last saw you. 2-3 weeks ago I had an epidural at L4. They are trying to identify where the nerve pinch is. The pain in my back has been greatly reduced on the left side, but now it's picking up on the right side. Nothing has changed in the thigh, it's still cramping. I've been relying on Tramadol more than I would like. \" He reports pain only if he's on his feet all day, \"and even then, it's mostly my hip. \"     The patient reports that he notified his doctors to his response and he is anticipating needing a surgery. \"I do have another bulging area in the disc that they saw on the MRI. \"     Pain/Symptom Presentation:   Pain at rest: 3-4/10   Pain at worst: 7-8/10     Objective:       Treatment:    Therapeutic Exercise: x 15min  Prone quad stretch: x 30\" (B)   Manual hamstring stretch: x 30\" (B)   Hip PROM - IR/ER x 10   Supine wipers: 1 x 10   Supine hip flexor stretch off EOB: x 30\" (B)   Seated lumbar flex stretch: x 30\"     Manual Therapy: x 30min  Lumbar PA accessory joint mobs - prone: L1-L5 - Grade 3, 4 x 10\" each  Thoracic PA accessory joint mobs - prone:T1-T12 - Grade 3, 2 x 10\" each  Soft tissue mobilization - prone: low back musculature and soft tissue - lumbar paraspinals, glutes, thoracolumbar fascia, QL     Assessment: Ed arrives today after two-month gap in his care.  Since his last appt, he has had an epidural injection at L4 nerve root which has alleviate quite a bit of his left-sided back pain, but he is still with cramping and tightness complaints on his left thigh. The patient is now with some complaints of right-sided low back pain which may be due to compensatory movement patterns. The patient's epidural was meant to be somewhat diagnostic in determining the primary level affected. He is anticipating needing surgery, but not sure the surgical procedure to be performed yet. He anticipates that this surgery wouldn't be scheduled until September at some point. Overall, the patient shows improved gait pattern upon entering the clinic and seems to be tolerating his ADL's better and with less pain. He was with TTP over the lumbar spine to PA pressure. At this point, our goal is to maximize pain management and improvements in preparation for surgery. Goals:   Short-Term Goals:  Patient will improve low back and hip mobility as well as core strength to allow for intermittent pain-free forward bending to reach for and  objects up from the floor. Timeframe: 4 weeks. (MET 2/8/23)  Patient will improve low back and hip mobility to facilitate erect standing and walking after prolonged sitting of 2 hour(s). Timeframe: 4 weeks. (NOT YET MET 5/4/23)  Long-Term Goals:  Patient will improve low back mobility and postural endurance to maintain proper posture for prolonged standing of 2-3 hours for household activities and community integration. Timeframe: 8 weeks. (NOT YET MET 5/4/23)  Patient will improve core strength and LE strength to facilitate lifting/pushing/pulling objects up to 50lbs for household chores and yardwork. Timeframe: 12 weeks. (NOT YET MET 5/4/23)  Patient will improve static and dynamic balance and foot/ankle stabilization to facilitate SLS for > 15 sec for safety with community ambulation. Timeframe: 12 weeks. (NOT YET MET 5/4/23)  Patient will improve low back pain and core endurance to facilitate walking distances of 2 miles daily to facilitate household ambulation and community ambulation. Timeframe: 12 weeks.  (NOT YET MET 5/4/23)    Plan: Continue to work on pain management and maximizing function in preparation for surgery. HEP: Patient was issued a HEP handout 1/18/23 including cat/camel stretch, supine piriformis stretch, supine hip flexor stretch, 90-90 heel taps, S/L clamshell, and modified downward dog.      Charges: MT x 2, Therex x 1  Total Timed Treatment: 40min    Total Treatment Time: 40min

## 2023-08-08 ENCOUNTER — OFFICE VISIT (OUTPATIENT)
Dept: FAMILY MEDICINE CLINIC | Facility: CLINIC | Age: 63
End: 2023-08-08
Payer: COMMERCIAL

## 2023-08-08 VITALS
HEIGHT: 76 IN | DIASTOLIC BLOOD PRESSURE: 72 MMHG | BODY MASS INDEX: 31.64 KG/M2 | RESPIRATION RATE: 16 BRPM | SYSTOLIC BLOOD PRESSURE: 134 MMHG | WEIGHT: 259.81 LBS | TEMPERATURE: 98 F | HEART RATE: 76 BPM

## 2023-08-08 DIAGNOSIS — E11.40 TYPE 2 DIABETES MELLITUS WITH DIABETIC NEUROPATHY, WITHOUT LONG-TERM CURRENT USE OF INSULIN (HCC): Primary | ICD-10-CM

## 2023-08-08 DIAGNOSIS — E66.9 OBESITY (BMI 30.0-34.9): ICD-10-CM

## 2023-08-08 DIAGNOSIS — G47.33 OSA ON CPAP: ICD-10-CM

## 2023-08-08 DIAGNOSIS — I10 ESSENTIAL (PRIMARY) HYPERTENSION: ICD-10-CM

## 2023-08-08 DIAGNOSIS — E11.42 DIABETIC POLYNEUROPATHY ASSOCIATED WITH TYPE 2 DIABETES MELLITUS (HCC): ICD-10-CM

## 2023-08-08 PROCEDURE — 3075F SYST BP GE 130 - 139MM HG: CPT | Performed by: NURSE PRACTITIONER

## 2023-08-08 PROCEDURE — 3008F BODY MASS INDEX DOCD: CPT | Performed by: NURSE PRACTITIONER

## 2023-08-08 PROCEDURE — 99214 OFFICE O/P EST MOD 30 MIN: CPT | Performed by: NURSE PRACTITIONER

## 2023-08-08 PROCEDURE — 3078F DIAST BP <80 MM HG: CPT | Performed by: NURSE PRACTITIONER

## 2023-08-09 ENCOUNTER — OFFICE VISIT (OUTPATIENT)
Dept: PHYSICAL THERAPY | Age: 63
End: 2023-08-09
Attending: NEUROLOGICAL SURGERY
Payer: COMMERCIAL

## 2023-08-09 PROCEDURE — 97140 MANUAL THERAPY 1/> REGIONS: CPT

## 2023-08-09 PROCEDURE — 97110 THERAPEUTIC EXERCISES: CPT

## 2023-08-09 NOTE — PROGRESS NOTES
Date of Service: 8/92023    Dx: Lumbar radiculopathy (M54.16)          Insurance: [address] [place] [address] Limits: 60 visit limit, auth required  Visit #: 21  Authorized # of Visits: 27  POC/Auth Expiration: 2/28/2023  Date of Last PN: 5/10/23 (Visit #15)  Authorizing Physician/Provider: Carlos Solitario MD visit: N/A  Fall Risk: Standard         Precautions: None            Subjective: \"Whatever the epidural did, it's worn off. They want to do two more epidural before anything else happens. I have a video visit with someone from Dr. Eyal Velasquez office. \" He reports that the epidural started wearing off about a week ago, \"but it's been gradual.\" He reports that he is now having right leg pain in addition to his back and the tightness in his left leg is still the same, \"it's basically clenched all the time. \"     Pain/Symptom Presentation:   Pain at rest: 3-4/10   Pain at worst: 7-8/10     Objective:       Treatment:    Therapeutic Exercise: x 25min  Prone quad stretch: x 30\" (B)   Manual hamstring stretch: x 30\" (B)   Hip PROM - IR/ER x 10   Supine hip ER stretch: x 30\" (B)   Supine wipers: 1 x 10   Supine hip flexor stretch off EOB: x 30\" (B)   Supine marching: 1 x 10 (B)  Seated lumbar flex stretch: x 30\"   Supine marching: 1 x 10 (B)   SLR: 1 x 10 (B)  LAQ: 2 x 10 x 3\", 6# ankle weight    Manual Therapy: x 20min  Lumbar PA accessory joint mobs - prone: L1-L5 - Grade 3, 4 x 10\" each  Thoracic PA accessory joint mobs - prone:T1-T12 - Grade 3, 2 x 10\" each  Soft tissue mobilization - prone: low back musculature and soft tissue - lumbar paraspinals, glutes, thoracolumbar fascia, QL     Assessment: Ed reports increasing pain and feels that the epidural is starting to wear off. The patient reports pain returning in his low back, left leg, and now right leg.  He is in conversation with his doctor about the next steps, but it seems that insurance company wants to try additional epidural injections before moving forward with surgical intervention. The patient inquires about additional quad strengthening for post-surgical recovery. We added in some quad and hip flexor strengthening exercises in addition to low back and hip flexibility and stretching exercises. We will continue to progress patient as able. Goals:   Short-Term Goals:  Patient will improve low back and hip mobility as well as core strength to allow for intermittent pain-free forward bending to reach for and  objects up from the floor. Timeframe: 4 weeks. (MET 2/8/23)  Patient will improve low back and hip mobility to facilitate erect standing and walking after prolonged sitting of 2 hour(s). Timeframe: 4 weeks. (NOT YET MET 5/4/23)  Long-Term Goals:  Patient will improve low back mobility and postural endurance to maintain proper posture for prolonged standing of 2-3 hours for household activities and community integration. Timeframe: 8 weeks. (NOT YET MET 5/4/23)  Patient will improve core strength and LE strength to facilitate lifting/pushing/pulling objects up to 50lbs for household chores and yardwork. Timeframe: 12 weeks. (NOT YET MET 5/4/23)  Patient will improve static and dynamic balance and foot/ankle stabilization to facilitate SLS for > 15 sec for safety with community ambulation. Timeframe: 12 weeks. (NOT YET MET 5/4/23)  Patient will improve low back pain and core endurance to facilitate walking distances of 2 miles daily to facilitate household ambulation and community ambulation. Timeframe: 12 weeks. (NOT YET MET 5/4/23)    Plan: Continue to work on pain management and maximizing function in preparation for surgery. HEP: Patient was issued a HEP handout 1/18/23 including cat/camel stretch, supine piriformis stretch, supine hip flexor stretch, 90-90 heel taps, S/L clamshell, and modified downward dog.      Charges: MT x 1, Therex x 2  Total Timed Treatment: 45min    Total Treatment Time: 45min

## 2023-08-16 ENCOUNTER — OFFICE VISIT (OUTPATIENT)
Dept: PHYSICAL THERAPY | Age: 63
End: 2023-08-16
Attending: NEUROLOGICAL SURGERY
Payer: COMMERCIAL

## 2023-08-16 PROCEDURE — 97110 THERAPEUTIC EXERCISES: CPT

## 2023-08-16 PROCEDURE — 97140 MANUAL THERAPY 1/> REGIONS: CPT

## 2023-08-16 NOTE — PROGRESS NOTES
Date of Service: 8/16/2023    Dx: Lumbar radiculopathy (M54.16)          Insurance: [employer] Limits: 60 visit limit, auth required  Visit #: 21  Authorized # of Visits: 27  POC/Auth Expiration: 2/28/2023  Date of Last PN: 5/10/23 (Visit #15)  Authorizing Physician/Provider: Yoana Solitario MD visit: N/A  Fall Risk: Standard         Precautions: None            Subjective: The patient hasn't heard back yet from the doctor. He reports that he had to refill the Tramadol, but won't take the muscle relaxer. The patient reports that he cannot take the Tramadol at night because it makes him restless. The patient reports that his pain \"has not been great. It's not really much different than the last time I saw you. \" He reports that he went to a concert and had to stand the entire time and it wasn't easy and had increased pain the next day. He reports that he is still feeling pain in his right leg, but infrequent. Pain/Symptom Presentation:   Pain at rest: 3-4/10   Pain at worst: 7-8/10     Objective:       Treatment:    Therapeutic Exercise: x 25min  Prone quad stretch: x 30\" (B)   Manual hamstring stretch: x 30\" (B)   Hip PROM - IR/ER x 10   Supine wipers: 1 x 10   Supine marching: 2 x 10 (B)  Seated lumbar flex stretch: x 30\"   SLR: 1 x 10 (B)  LAQ: 2 x 10 x 3\", 6# ankle weight  Shuttle DLP: x 20, 4 cords  Shuttle SLP: x 20 (B), 3 cords     Manual Therapy: x 20min  Lumbar PA accessory joint mobs - prone: L1-L5 - Grade 3, 4 x 10\" each  Thoracic PA accessory joint mobs - prone:T1-T12 - Grade 3, 2 x 10\" each  Soft tissue mobilization - prone: low back musculature and soft tissue - lumbar paraspinals, glutes, thoracolumbar fascia, QL     Assessment: Ed is with similar pain ratings and complaints from his last session. There hasn't been any update from his doctor on the next steps in his POC.  We continue to work on spinal mobility and hip flexibility, however we have recently been progressing LE strengthening per patient request, focusing on quad and hip flexor strengthening. We will continue to progress the patient as able to prepare for possible surgical intervention. Goals:   Short-Term Goals:  Patient will improve low back and hip mobility as well as core strength to allow for intermittent pain-free forward bending to reach for and  objects up from the floor. Timeframe: 4 weeks. (MET 2/8/23)  Patient will improve low back and hip mobility to facilitate erect standing and walking after prolonged sitting of 2 hour(s). Timeframe: 4 weeks. (NOT YET MET 5/4/23)  Long-Term Goals:  Patient will improve low back mobility and postural endurance to maintain proper posture for prolonged standing of 2-3 hours for household activities and community integration. Timeframe: 8 weeks. (NOT YET MET 5/4/23)  Patient will improve core strength and LE strength to facilitate lifting/pushing/pulling objects up to 50lbs for household chores and yardwork. Timeframe: 12 weeks. (NOT YET MET 5/4/23)  Patient will improve static and dynamic balance and foot/ankle stabilization to facilitate SLS for > 15 sec for safety with community ambulation. Timeframe: 12 weeks. (NOT YET MET 5/4/23)  Patient will improve low back pain and core endurance to facilitate walking distances of 2 miles daily to facilitate household ambulation and community ambulation. Timeframe: 12 weeks. (NOT YET MET 5/4/23)    Plan: Continue to work on pain management and maximizing function/strength in preparation for surgery. HEP: Patient was issued a HEP handout 1/18/23 including cat/camel stretch, supine piriformis stretch, supine hip flexor stretch, 90-90 heel taps, S/L clamshell, and modified downward dog.      Charges: MT x 1, Therex x 2  Total Timed Treatment: 45min    Total Treatment Time: 45min

## 2023-08-23 ENCOUNTER — OFFICE VISIT (OUTPATIENT)
Dept: PHYSICAL THERAPY | Age: 63
End: 2023-08-23
Attending: NEUROLOGICAL SURGERY
Payer: COMMERCIAL

## 2023-08-23 ENCOUNTER — PATIENT MESSAGE (OUTPATIENT)
Dept: FAMILY MEDICINE CLINIC | Facility: CLINIC | Age: 63
End: 2023-08-23

## 2023-08-23 DIAGNOSIS — M54.16 LUMBAR RADICULOPATHY, ACUTE: Primary | ICD-10-CM

## 2023-08-23 PROCEDURE — 97140 MANUAL THERAPY 1/> REGIONS: CPT

## 2023-08-23 PROCEDURE — 97110 THERAPEUTIC EXERCISES: CPT

## 2023-08-23 NOTE — PROGRESS NOTES
Date of Service: 8/23/2023    Dx: Lumbar radiculopathy (M54.16)          Insurance: [address] [place] [address] Limits: 60 visit limit, auth required  Visit #: 25  Authorized # of Visits: 27  POC/Auth Expiration: 2/28/2023  Date of Last PN: 5/10/23 (Visit #15)  Authorizing Physician/Provider: Hal Solitario MD visit: N/A  Fall Risk: Standard         Precautions: None            Subjective: \"I feel like crap. \" The patient hasn't received any update from his doctor, though he tried calling him twice. The patient reports that he was sore after his last appt, \"but I certainly felt that it was worth it. Pain/Symptom Presentation:   Pain at rest: 5-6/10 (low back), 5-6/10 (leg)   Pain at worst: 7-8/10 (low back)    Objective:       Treatment:    Therapeutic Exercise: x 20min  Prone quad stretch: x 30\" (B)   Manual hamstring stretch: x 30\" (B)   Hip PROM - IR/ER x 10   Supine marching: 2 x 10 (B)  SLR: 2 x 10 (B)  LAQ: 2 x 10 x 3\", 6# ankle weight  Standing marching: 2 x 10 (B)   Sit to stand: 1 x 15    Manual Therapy: x 20min  Lumbar PA accessory joint mobs - prone: L1-L5 - Grade 3, 4 x 10\" each  Thoracic PA accessory joint mobs - prone:T1-T12 - Grade 3, 2 x 10\" each  Soft tissue mobilization - prone: low back musculature and soft tissue - lumbar paraspinals, glutes, thoracolumbar fascia, QL     Assessment: Ed is still with remaining back pain complaints and has not yet received an updated from his doctor. He reports some quad soreness after his last session, but understands this is expected in the strengthening process. He is still with complaints of stiffness in his low back as well as flexibility deficits in his quad. He was with some increased tightness in his left quad this date compared to last session. We continue to work on quad strength with the patient and attempts to maximize strengthening prior to surgery.      Goals:   Short-Term Goals:  Patient will improve low back and hip mobility as well as core strength to allow for intermittent pain-free forward bending to reach for and  objects up from the floor. Timeframe: 4 weeks. (MET 2/8/23)  Patient will improve low back and hip mobility to facilitate erect standing and walking after prolonged sitting of 2 hour(s). Timeframe: 4 weeks. (NOT YET MET 5/4/23)  Long-Term Goals:  Patient will improve low back mobility and postural endurance to maintain proper posture for prolonged standing of 2-3 hours for household activities and community integration. Timeframe: 8 weeks. (NOT YET MET 5/4/23)  Patient will improve core strength and LE strength to facilitate lifting/pushing/pulling objects up to 50lbs for household chores and yardwork. Timeframe: 12 weeks. (NOT YET MET 5/4/23)  Patient will improve static and dynamic balance and foot/ankle stabilization to facilitate SLS for > 15 sec for safety with community ambulation. Timeframe: 12 weeks. (NOT YET MET 5/4/23)  Patient will improve low back pain and core endurance to facilitate walking distances of 2 miles daily to facilitate household ambulation and community ambulation. Timeframe: 12 weeks. (NOT YET MET 5/4/23)    Plan: Continue to work on pain management and maximizing function/strength in preparation for surgery. HEP: Patient was issued a HEP handout 1/18/23 including cat/camel stretch, supine piriformis stretch, supine hip flexor stretch, 90-90 heel taps, S/L clamshell, and modified downward dog.      Charges: MT x 1, Therex x 2  Total Timed Treatment: 40min    Total Treatment Time: 40min

## 2023-08-24 NOTE — TELEPHONE ENCOUNTER
Please advise on patient referral request for pain management. (Dr. Brandyn Aguilar). Patient has upcomming appointment with Dr. Brandyn Aguilar on 9/6/23. Last office visit[de-identified] 8/8/23    Referral pended. Please review/approve if appropriate. Thank you.

## 2023-08-24 NOTE — TELEPHONE ENCOUNTER
From: Luke Messer. To: Lui Ramos MD  Sent: 8/23/2023 2:34 PM CDT  Subject: Panchito Bloom, Dr. Layne Madison    I realize Dr. Nydia Maguire is retiring in Sept. but I need a new referral for the above epidural. I just Kristine Mello but was unsure if she can provide the referral. Please let me know as I have ab appointment with Dr. Liv Sahu on 9-6-23. Thank you.

## 2023-08-28 DIAGNOSIS — M54.16 LUMBAR RADICULOPATHY, ACUTE: ICD-10-CM

## 2023-08-29 ENCOUNTER — TELEPHONE (OUTPATIENT)
Dept: FAMILY MEDICINE CLINIC | Facility: CLINIC | Age: 63
End: 2023-08-29

## 2023-08-29 DIAGNOSIS — E11.42 DIABETIC POLYNEUROPATHY ASSOCIATED WITH TYPE 2 DIABETES MELLITUS (HCC): ICD-10-CM

## 2023-08-29 DIAGNOSIS — E11.40 TYPE 2 DIABETES MELLITUS WITH DIABETIC NEUROPATHY, WITHOUT LONG-TERM CURRENT USE OF INSULIN (HCC): ICD-10-CM

## 2023-08-29 RX ORDER — TRAMADOL HYDROCHLORIDE 50 MG/1
50 TABLET ORAL EVERY 6 HOURS PRN
Qty: 40 TABLET | Refills: 0 | Status: SHIPPED | OUTPATIENT
Start: 2023-08-29

## 2023-08-30 ENCOUNTER — MED REC SCAN ONLY (OUTPATIENT)
Dept: FAMILY MEDICINE CLINIC | Facility: CLINIC | Age: 63
End: 2023-08-30

## 2023-08-30 ENCOUNTER — OFFICE VISIT (OUTPATIENT)
Dept: PHYSICAL THERAPY | Age: 63
End: 2023-08-30
Attending: NEUROLOGICAL SURGERY
Payer: COMMERCIAL

## 2023-08-30 PROCEDURE — 97110 THERAPEUTIC EXERCISES: CPT

## 2023-08-30 PROCEDURE — 97140 MANUAL THERAPY 1/> REGIONS: CPT

## 2023-09-06 ENCOUNTER — TELEMEDICINE (OUTPATIENT)
Dept: PAIN CLINIC | Facility: CLINIC | Age: 63
End: 2023-09-06
Payer: COMMERCIAL

## 2023-09-06 ENCOUNTER — OFFICE VISIT (OUTPATIENT)
Dept: PHYSICAL THERAPY | Age: 63
End: 2023-09-06
Attending: NEUROLOGICAL SURGERY
Payer: COMMERCIAL

## 2023-09-06 ENCOUNTER — TELEPHONE (OUTPATIENT)
Dept: PAIN CLINIC | Facility: CLINIC | Age: 63
End: 2023-09-06

## 2023-09-06 DIAGNOSIS — M54.16 LUMBAR RADICULITIS: Primary | ICD-10-CM

## 2023-09-06 DIAGNOSIS — M54.16 LUMBAR RADICULOPATHY: Primary | ICD-10-CM

## 2023-09-06 PROCEDURE — 97140 MANUAL THERAPY 1/> REGIONS: CPT

## 2023-09-06 PROCEDURE — 97110 THERAPEUTIC EXERCISES: CPT

## 2023-09-06 PROCEDURE — 99214 OFFICE O/P EST MOD 30 MIN: CPT | Performed by: ANESTHESIOLOGY

## 2023-09-06 NOTE — PROGRESS NOTES
Date of Service: 9/6/2023    Dx: Lumbar radiculopathy (M54.16)          Insurance: [address] [place] [address] Limits: 60 visit limit, auth required  Visit #: 24   Authorized # of Visits: 27   POC/Auth Expiration: 2/28/2023  Date of Last PN: 5/10/23 (Visit #15)  Authorizing Physician/Provider: Brie Solitario MD visit: N/A  Fall Risk: Standard         Precautions: None            Subjective: \"Everything that was going on in my left leg is now going on in my right. My right leg and thigh are starting to get really tight. It can be both hips at the same time. I have a video consultation with the pain management doctor at 1:00.\" The patient reports that the exercises are going fine at home. He reports compliance with his HEP but is still with pain. He had to take a Tramadol this morning. \"It hasn't been great. \"     Objective:     Pain/Symptom Presentation:   Pain at rest: 4/10 (low back), 6/10 (leg)   Pain at worst: 6/10 (low back)    Treatment:    Therapeutic Exercise: x 18min  HEP: cat/camel stretch, prone press up, supine wipers, S/L thoracic rotation, seated lumbar flex stretch, SLR, standing marching, and sit to stands. Prone quad stretch: x 30\" (B)   Manual hamstring stretch: x 30\" (B)   Manual hip flexor stretch off EOB: x 30\" (B)   Hip PROM - IR/ER x 10   Supine wipers: 1 x 10 (B)  Sidelying thoracic rotation: 1 x 10 (B)   Seated lumbar flex stretch: x 30\"   SLR: 2 x 10 (B)  LAQ: x 40 (B), 8# ankle weight    Manual Therapy: x 22min  Lumbar PA accessory joint mobs - prone: L1-L5 - Grade 3, 4 x 10\" each  Thoracic PA accessory joint mobs - prone:T1-T12 - Grade 3, 2 x 10\" each  Soft tissue mobilization - prone: low back musculature and soft tissue - lumbar paraspinals, glutes, thoracolumbar fascia, QL   Soft tissue mobilization with runner's stick: (B) quad, hip flexor     Assessment: Ed is now complaining of right-sided leg pain and tightness.  The patient has remained compliant with his HEP but physical therapy and his exercise regimen only seem to provide temporary improvements in his pain and symptom management. The patient is to have a follow-up appt with his pain management specialist this afternoon. We will continue to facilitate pain management as tolerated. Goals:   Short-Term Goals:  Patient will improve low back and hip mobility as well as core strength to allow for intermittent pain-free forward bending to reach for and  objects up from the floor. Timeframe: 4 weeks. (MET 2/8/23)  Patient will improve low back and hip mobility to facilitate erect standing and walking after prolonged sitting of 2 hour(s). Timeframe: 4 weeks. (NOT YET MET 5/4/23)  Long-Term Goals:  Patient will improve low back mobility and postural endurance to maintain proper posture for prolonged standing of 2-3 hours for household activities and community integration. Timeframe: 8 weeks. (NOT YET MET 5/4/23)  Patient will improve core strength and LE strength to facilitate lifting/pushing/pulling objects up to 50lbs for household chores and yardwork. Timeframe: 12 weeks. (NOT YET MET 5/4/23)  Patient will improve static and dynamic balance and foot/ankle stabilization to facilitate SLS for > 15 sec for safety with community ambulation. Timeframe: 12 weeks. (NOT YET MET 5/4/23)  Patient will improve low back pain and core endurance to facilitate walking distances of 2 miles daily to facilitate household ambulation and community ambulation. Timeframe: 12 weeks. (NOT YET MET 5/4/23)    Plan: Continue to work on pain management and maximizing function/strength. HEP: Patient was issued a HEP handout 8/30/23 including cat/camel stretch, prone press up, supine wipers, S/L thoracic rotation, seated lumbar flex stretch, SLR, standing marching, and sit to stands.      Charges: MT x 2, Therex x 1  Total Timed Treatment: 40min    Total Treatment Time: 40min

## 2023-09-06 NOTE — PROGRESS NOTES
Telehealth outside of [address] Verbal Consent   I conducted a telehealth visit with Elias Rich. today, 23, which was completed using two-way, real-time interactive audio and video communication. This has been done in good emelyn to provide continuity of care in the best interest of the provider-patient relationship, due to the COVID -19 public health crisis/national emergency where restrictions of face-to-face office visits are ongoing. Every conscious effort was taken to allow for sufficient and adequate time to complete the visit. The patient was made aware of the limitations of the telehealth visit, including treatment limitations as no physical exam could be performed. The patient was advised to call 911 or to go to the ER in case there was an emergency. The patient was also advised of the potential privacy & security concerns related to the telehealth platform. The patient was made aware of where to find [facility] notice of privacy practices, telehealth consent form and other related consent forms and documents. which are located on the [facility] website. The patient verbally agreed to telehealth consent form, related consents and the risks discussed. Lastly, the patient confirmed that they were in [place]. Included in this visit, time may have been spent reviewing labs, medications, radiology tests and decision making. Appropriate medical decision-making and tests are ordered as detailed in the plan of care above. Coding/billing information is submitted for this visit based on complexity of care and/or time spent for the visit. Name: Elias Rich. : 1960   DOS: 2023     Pain Clinic Follow Up Visit:   No chief complaint on file. Elias Rich. is a 57year old male with a history of lumbar radiculopathy here for follow-up. The patient was treated in July with a left L4 transforaminal epidural steroid injection with modest relief. Currently undergoing physical therapy but also complaining of right-sided symptoms. Has imaging with evidence of multilevel degenerative changes and foraminal stenosis. Pt denies any chills, fever, or weakness. There is no bladder or bowel incontinence associated with the pain. REVIEW OF SYSTEMS:  A ten point review of systems was performed with pertinent positives and negatives in the HPI. No Known Allergies    Current Outpatient Medications   Medication Sig Dispense Refill    metFORMIN HCl 1000 MG Oral Tab Take 1 tablet (1,000 mg total) by mouth 2 (two) times daily with meals. 180 tablet 0    traMADol 50 MG Oral Tab Take 1 tablet (50 mg total) by mouth every 6 (six) hours as needed for Pain. 40 tablet 0    orphenadrine  MG Oral Tablet 12 Hr Take 100 mg by mouth 2 (two) times daily. 120 tablet 0    atorvastatin 10 MG Oral Tab Take 1 tablet (10 mg total) by mouth nightly. 90 tablet 0    JARDIANCE 25 MG Oral Tab TAKE 1 TABLET BY MOUTH DAILY 90 tablet 1    LOSARTAN 100 MG Oral Tab TAKE 1 TABLET BY MOUTH DAILY 90 tablet 1    Liraglutide (VICTOZA) 18 MG/3ML Subcutaneous Solution Pen-injector Inject 1.8 mg under the skin daily 27 mL 1    gabapentin 300 MG Oral Cap Take 1 capsule (300 mg total) by mouth in the morning and 1 capsule (300 mg total) at noon and 1 capsule (300 mg total) in the evening. 270 capsule 1    BD PEN NEEDLE KATY 2ND GEN 32G X 4 MM Does not apply Misc USE 1 DAILY 100 each 3    cyclobenzaprine 10 MG Oral Tab Take 1 tablet (10 mg total) by mouth 3 (three) times daily as needed for Muscle spasms. (Patient not taking: Reported on 6/16/2023) 60 tablet 1    aspirin 81 MG Oral Tab EC Take 1 tablet (81 mg total) by mouth daily. Glucose Blood (CONTOUR NEXT TEST) In Vitro Strip Pt testing bid Pt uses contour next meter 200 each 3    Alcohol Swabs (ALCOHOL PREP) Does not apply Pads Use daily with victoza 90 each 3    FAMOTIDINE OR Take 1 tablet by mouth daily as needed.            EXAM: There were no vitals taken for this visit. General:  Patient is a(n) 57year old year old male in no acute distress. Neurologic[de-identified] WNL-Orientation to time, place and person, normal mood & affect, concentration & attention span intact. Inspection:  Ambulates with well-coordinated, fluid, non-antalgic gait. Gait is normal.  Neck: Full range of motion  Back: Observed seated in no acute distress  Respiratory: Nonlabored . IMAGES:     Lumbar MRI reviewed with evidence of multilevel degenerative changes of foraminal stenosis    ASSESSMENT AND PLAN:   Lumbar radiculopathy  (primary encounter diagnosis)      The patient is a 58-year-old gentleman with a history of lumbar radiculopathy. Has done well with prior left-sided transforaminal injection. Now has pain in the low side. Discussed imaging findings and he has bilateral foraminal stenosis. Recommend intralaminar epidural steroid injection. May ultimately need surgical consultation for decompression. Orders:Orders Placed This Encounter      [address] [facility]        Radiology orders and consultations:None  The patient indicates understanding of these issues and agrees to the plan. No follow-ups on file.     eLslee Smith MD, 9/6/2023, 1:50 PM

## 2023-09-06 NOTE — TELEPHONE ENCOUNTER
PATIENT NAME:  Sarah Jo JR./ [phone] (home)/ There is no work phone number on file. PATIENT :  1960  REFERRAL ID #:  [id number]  REFERRAL STATUS:  Authorized [1]  REVIEW REFERRAL NOTES FOR MORE INFORMATION:  DATE AUTHORIZED:  2023 // Sade Garza DATE: 2024   REFERRED BY:  Gabby Pearl MD (TEL: [phone])  REFERRED TO: Gabby Pearl MD (TEL: [phone])     No vendor specified on referral. / No vendor phone number known.   [facility]  REFERRED FOR:    Diagnoses:    M54.16 (ICD-10-CM) - 724.4 (ICD-9-CM) - Lumbar radiculopathy  Procedures:     [id number] - [facility]IGATOR   NUMBER OF VISITS AUTH: 1

## 2023-09-06 NOTE — TELEPHONE ENCOUNTER
Order Questions    Question Answer   Anesthesia Type Local   Provider [facility] Procedure Lab   Procedure Lumbar VITO   CPT (Hit enter after each entry) NJX INTERLAMINAR LMBR/SAC   Medical clearance requested (will send to Pain Navigator) No   Patient has Medicare coverage?  No   Comments (Please list entire procedure name here.) lumbar epidural steroid injection

## 2023-09-14 ENCOUNTER — APPOINTMENT (OUTPATIENT)
Dept: GENERAL RADIOLOGY | Facility: HOSPITAL | Age: 63
End: 2023-09-14
Attending: ANESTHESIOLOGY
Payer: COMMERCIAL

## 2023-09-14 ENCOUNTER — HOSPITAL ENCOUNTER (OUTPATIENT)
Facility: HOSPITAL | Age: 63
Setting detail: HOSPITAL OUTPATIENT SURGERY
Discharge: HOME | End: 2023-09-14
Attending: ANESTHESIOLOGY | Admitting: ANESTHESIOLOGY
Payer: COMMERCIAL

## 2023-09-14 VITALS
DIASTOLIC BLOOD PRESSURE: 65 MMHG | TEMPERATURE: 98 F | WEIGHT: 259 LBS | HEIGHT: 76 IN | RESPIRATION RATE: 18 BRPM | HEART RATE: 85 BPM | OXYGEN SATURATION: 100 % | SYSTOLIC BLOOD PRESSURE: 130 MMHG | BODY MASS INDEX: 31.54 KG/M2

## 2023-09-14 LAB — GLUCOSE BLD-MCNC: 152 MG/DL (ref 70–99)

## 2023-09-14 PROCEDURE — 3E0R33Z INTRODUCTION OF ANTI-INFLAMMATORY INTO SPINAL CANAL, PERCUTANEOUS APPROACH: ICD-10-PCS | Performed by: ANESTHESIOLOGY

## 2023-09-14 PROCEDURE — 62323 NJX INTERLAMINAR LMBR/SAC: CPT | Performed by: ANESTHESIOLOGY

## 2023-09-14 RX ORDER — NICOTINE POLACRILEX 4 MG
15 LOZENGE BUCCAL
Status: DISCONTINUED | OUTPATIENT
Start: 2023-09-14 | End: 2023-09-14

## 2023-09-14 RX ORDER — INSULIN ASPART 100 [IU]/ML
3 INJECTION, SOLUTION INTRAVENOUS; SUBCUTANEOUS ONCE
Status: DISCONTINUED | OUTPATIENT
Start: 2023-09-14 | End: 2023-09-14

## 2023-09-14 RX ORDER — DEXAMETHASONE SODIUM PHOSPHATE 10 MG/ML
INJECTION, SOLUTION INTRAMUSCULAR; INTRAVENOUS
Status: DISCONTINUED | OUTPATIENT
Start: 2023-09-14 | End: 2023-09-14

## 2023-09-14 RX ORDER — LIDOCAINE HYDROCHLORIDE 10 MG/ML
INJECTION, SOLUTION EPIDURAL; INFILTRATION; INTRACAUDAL; PERINEURAL
Status: DISCONTINUED | OUTPATIENT
Start: 2023-09-14 | End: 2023-09-14

## 2023-09-14 RX ORDER — NICOTINE POLACRILEX 4 MG
30 LOZENGE BUCCAL
Status: DISCONTINUED | OUTPATIENT
Start: 2023-09-14 | End: 2023-09-14

## 2023-09-14 RX ORDER — SODIUM CHLORIDE 9 MG/ML
INJECTION INTRAVENOUS
Status: DISCONTINUED | OUTPATIENT
Start: 2023-09-14 | End: 2023-09-14

## 2023-09-14 RX ORDER — DEXTROSE MONOHYDRATE 25 G/50ML
50 INJECTION, SOLUTION INTRAVENOUS
Status: DISCONTINUED | OUTPATIENT
Start: 2023-09-14 | End: 2023-09-14

## 2023-09-14 NOTE — H&P
History & Physical Examination    Patient Name: Keagan Cool MRN: [id number]  CSN: [id number]  YOB: 1960    Pre-Operative Diagnosis:  Lumbar radiculitis [M54.16]    Present Illness: Patient with lumbar radiculitis    traMADol 50 MG Oral Tab, Take 1 tablet (50 mg total) by mouth every 6 (six) hours as needed for Pain., Disp: 40 tablet, Rfl: 0, 9/14/2023 at 0800  metFORMIN HCl 1000 MG Oral Tab, Take 1 tablet (1,000 mg total) by mouth 2 (two) times daily with meals. , Disp: 180 tablet, Rfl: 0, 9/14/2023  orphenadrine  MG Oral Tablet 12 Hr, Take 100 mg by mouth 2 (two) times daily. , Disp: 120 tablet, Rfl: 0, Past Week  atorvastatin 10 MG Oral Tab, Take 1 tablet (10 mg total) by mouth nightly., Disp: 90 tablet, Rfl: 0, 9/14/2023  JARDIANCE 25 MG Oral Tab, TAKE 1 TABLET BY MOUTH DAILY, Disp: 90 tablet, Rfl: 1, 9/13/2023  LOSARTAN 100 MG Oral Tab, TAKE 1 TABLET BY MOUTH DAILY, Disp: 90 tablet, Rfl: 1, 9/14/2023  Liraglutide (VICTOZA) 18 MG/3ML Subcutaneous Solution Pen-injector, Inject 1.8 mg under the skin daily, Disp: 27 mL, Rfl: 1, 9/14/2023  gabapentin 300 MG Oral Cap, Take 1 capsule (300 mg total) by mouth in the morning and 1 capsule (300 mg total) at noon and 1 capsule (300 mg total) in the evening., Disp: 270 capsule, Rfl: 1, 9/14/2023 at 0800  FAMOTIDINE OR, Take 1 tablet by mouth daily as needed. , Disp: , Rfl: , Past Week  BD PEN NEEDLE KAYT 2ND GEN 32G X 4 MM Does not apply Misc, USE 1 DAILY, Disp: 100 each, Rfl: 3  cyclobenzaprine 10 MG Oral Tab, Take 1 tablet (10 mg total) by mouth 3 (three) times daily as needed for Muscle spasms. (Patient not taking: Reported on 6/16/2023), Disp: 60 tablet, Rfl: 1  aspirin 81 MG Oral Tab EC, Take 1 tablet (81 mg total) by mouth daily. , Disp: , Rfl: , 9/12/2023  Glucose Blood (CONTOUR NEXT TEST) In Vitro Strip, Pt testing bid Pt uses contour next meter, Disp: 200 each, Rfl: 3  Alcohol Swabs (ALCOHOL PREP) Does not apply Pads, Use daily with victoza, Disp: 90 each, Rfl: 3      insulin aspart (NovoLOG) 100 Units/mL vial 3 Units, 3 Units, Subcutaneous, Once  glucose (Dex4) 15 GM/59ML oral liquid 15 g, 15 g, Oral, Q15 Min PRN   Or  glucose (Glutose) 40% oral gel 15 g, 15 g, Oral, Q15 Min PRN   Or  glucose-vitamin C (Dex-4) chewable tab 4 tablet, 4 tablet, Oral, Q15 Min PRN   Or  dextrose 50% injection 50 mL, 50 mL, Intravenous, Q15 Min PRN   Or  glucose (Dex4) 15 GM/59ML oral liquid 30 g, 30 g, Oral, Q15 Min PRN   Or  glucose (Glutose) 40% oral gel 30 g, 30 g, Oral, Q15 Min PRN   Or  glucose-vitamin C (Dex-4) chewable tab 8 tablet, 8 tablet, Oral, Q15 Min PRN        Allergies: No Known Allergies    Past Medical History:   Diagnosis Date    Belching     Occasionally    Bloating     Occasionally    Constipation     Occasionally    Diabetic peripheral neuropathy (HCC)     Esophageal reflux     Flatulence/gas pain/belching     Occasionally    Frequent use of laxatives     Occasionally    Hearing loss 1-1-2019    Age related    Heartburn     Occasionally    High blood pressure     Indigestion     Occasionally    Neuropathy     Sleep apnea     wears CPAP    Type II or unspecified type diabetes mellitus without mention of complication, not stated as uncontrolled     Unspecified essential hypertension     Visual impairment     Wears glasses 1-1-1990    Readers    Weight gain April 2020    Lock down     Past Surgical History:   Procedure Laterality Date    APPENDECTOMY      COLONOSCOPY      COLONOSCOPY  2015    OTHER      Right big toe partially amputated    SIGMOIDOSCOPY,DIAGNOSTIC  1989    [de-identified]  18 years ago     Family History   Problem Relation Age of Onset    Cancer Father         colon    Colon Cancer Father         Cause of death    Other (Other) Mother         alcoholism    Alcohol and Other Disorders Associated Mother         Contributed to her death in 18     Social History    Tobacco Use      Smoking status: Former        Packs/day: 0.50 Years: 38.00        Additional pack years: 0.00        Total pack years: 19.00        Types: Cigarettes        Quit date: 9/20/2019        Years since quitting: 3.9      Smokeless tobacco: Never      Tobacco comments: 11/2019    Alcohol use: Yes      Alcohol/week: 1.0 - 2.0 standard drink of alcohol      Types: 1 Glasses of wine per week      Comment: Occasionally, socially      SYSTEM Check if Review is Normal Check if Physical Exam is Normal If not normal, please explain:   HEENT [x ] [x ]    NECK & BACK [x ] [x ]    HEART [x ] [x ]    LUNGS [x ] [x ]    ABDOMEN [x ] [x ]    UROGENITAL [x ] [x ]    EXTREMITIES [x ] [x ]    OTHER        [ x ] I have discussed the risks and benefits and alternatives with the patient/family. They understand and agree to proceed with plan of care. [ x ] I have reviewed the History and Physical done within the last 30 days. Any changes noted above.     Faina Alejandro MD

## 2023-09-14 NOTE — OPERATIVE REPORT
[facility]  Operative Report  2023     Donna Riley Jr. Patient Status:  Hospital Outpatient Surgery    1960 MRN [id number]   Location [id number] [place] [address] Attending Lonnie Villalba MD   [facility] Day # 0 PCP Pineda Garcia MD     Indication: Bob Weaver is a 57year old male with lumbar radiculitis    Preoperative Diagnosis:  Lumbar radiculitis [M54.16]    Postoperative Diagnosis: Same as above. Procedure performed: LUMBAR INTERLAMINAR EPIDURAL INJECTION with local    Anesthesia: Local and IV Sedation. EBL: Less than 1 ml. Procedure Description:  After reviewing the patient's history and performing a focused physical examination, the diagnosis and positive previous diagnostic tests were confirmed and contraindications such as infection and coagulopathy were ruled out. Following review of allergies and potential side effects and complications, including but not necessarily limited to infection, allergic reaction, local tissue breakdown, nerve injury, post-dural puncture headache and paresis, the patient consented for the procedure. The patient was brought to the procedure room in prone position. After sterile prep of the lumbar spine, the L5-S1 interspace was identified with the help of fluoroscopy. Local anesthetic was given by a 25 gauge 1.5 inch needle with 1% lidocaine in that space level. Thereafter, a 20 gauge Tuohy needle was introduced and advanced under fluoroscopy. The epidural space was accessed by using loss of resistance to air technique. The needle position was confirmed with AP and lateral view under fluoroscopy. Omnipaque 180 was injected in 1 mL volume. A good epidurogram was obtained. Thereafter, dexamethasone 10 mg with normal saline of 5 mL in total volume of 6 mL was injected through the Tuohy needle. The needle was flushed with 1 mL lidocaine. The needle was withdrawn with the stylet intact in situ. The needle's tip was intact.   The patient tolerated the procedure very well without significant immediate complication. The patient's back was cleaned and sterile dressing was applied. The patient was discharged with an instruction to a responsible adult after discharge criteria were met. The patient was advised to contact us should any problems happen. The patient was also informed to go to the emergency room immediately if experiencing severe numbness or weakness in the extremities or experiencing bowel or bladder incontinence. Complications: None. Follow up: The patient was followed in the pain clinic as needed basis.           Paloma Benavidez MD

## 2023-09-14 NOTE — DISCHARGE INSTRUCTIONS
Home Care Instructions Following Your Pain Procedure     González Romano,  It has been a pleasure to have you as our patient. To help you at home, you must follow these general discharge instructions. We will review these with you before you are discharged. It is our hope that you have a complete and uneventful recovery from our procedure. General Instructions:  What to Expect:  Bandages from your procedure today can be removed when you get home. Please avoid soaking and/or swimming for 24 hours. Showering is okay  It is normal to have increased pain symptoms and/or pain at injection site for up to 3-5 days after procedure, you can use heat or ice (20 minutes on 20 minutes off) for comfort. You may experience some temporary side effects which may include restlessness or insomnia, flushing of the face, or heart palpitations. Please contact the provider if these symptoms do not resolve within 3-4 days. Lightheadedness or nausea may occur and should resolve within 24 to 48 hours. If you develop a headache after treatment, rest, drink fluids (with caffeine, if possible) and take mild over-the-counter pain medication. If the headache does not improve with the above treatment, contact the physician. Home Medications:  Resume all previously prescribed medication. Please avoid taking NSAIDs (Non-Steriodal Anti-Inflammatory Drugs) such as:  Ibuprofen ( Advil, Motrin) Aleve (Naproxen), Diclofenac, Meloxicam for 6 hours after procedure. If you are on Coumadin (Warfarin) or any other anti-coagulant (or \"blood thinning\") medication such as Plavix (Clopidogrel), Xarelto (Rivaroxaban), Eliquis (Apixaban), Effient (Prasugrel) etc., restart on the following day from the procedure unless otherwise directed by your provider. If you are a diabetic, please increase the frequency of your glucose monitoring after the procedure as steroids may cause a temporary (2-3 day) increase in your blood sugar.   Contact your primary care physician if your blood sugar remains elevated as you may require some medication adjustment. Diet:  Resume your regular diet as tolerated. Activity: We recommend that you relax and rest during the rest of your procedure day. If you feel weakness in your arms or legs do not drive. Follow-up Appointment  Please schedule a follow-up visit within 3 to 4 weeks after your last procedure date. Question or Concerns:  Feel free to call our office with any questions or concerns at [phone] (option #2)    Ruth Esposito  Thank you for coming to [facility] for your procedure. The nurses try very hard to make sure you receive the best care possible. Your trust in them as well as us is greatly appreciated.     Thanks so much,   Dr. Crescencio Robles

## 2023-09-15 ENCOUNTER — TELEPHONE (OUTPATIENT)
Dept: PAIN CLINIC | Facility: CLINIC | Age: 63
End: 2023-09-15

## 2023-09-20 ENCOUNTER — OFFICE VISIT (OUTPATIENT)
Dept: PHYSICAL THERAPY | Age: 63
End: 2023-09-20
Attending: NEUROLOGICAL SURGERY
Payer: COMMERCIAL

## 2023-09-20 ENCOUNTER — PATIENT MESSAGE (OUTPATIENT)
Dept: FAMILY MEDICINE CLINIC | Facility: CLINIC | Age: 63
End: 2023-09-20

## 2023-09-20 DIAGNOSIS — E11.40 TYPE 2 DIABETES MELLITUS WITH DIABETIC NEUROPATHY, WITHOUT LONG-TERM CURRENT USE OF INSULIN (HCC): ICD-10-CM

## 2023-09-20 DIAGNOSIS — E08.42 DIABETIC POLYNEUROPATHY ASSOCIATED WITH DIABETES MELLITUS DUE TO UNDERLYING CONDITION (HCC): ICD-10-CM

## 2023-09-20 PROCEDURE — 97140 MANUAL THERAPY 1/> REGIONS: CPT

## 2023-09-20 PROCEDURE — 97110 THERAPEUTIC EXERCISES: CPT

## 2023-09-20 NOTE — PROGRESS NOTES
Date of Service: 9/20/2023    Dx: Lumbar radiculopathy (M54.16)          Insurance: [address] [place] [address] Limits: 60 visit limit, auth required  Visit #: 25  Authorized # of Visits: 27   POC/Auth Expiration: 9/30/2023  Date of Last PN: 5/10/23 (Visit #15)  Authorizing Physician/Provider: Nano Solitario MD visit: N/A  Fall Risk: Standard         Precautions: None            Subjective: \"I suck. The epidural has only helped in the low back area but not in the legs. All day long, the leg pain is a 6/10 and I'm getting pain in the right leg. I have zero energy. To try to alleviate the pain, I've been relying on the Tramadol so I don't feel myself mentally. I have a follow up visit with the pain nilay. Based on that, I'll probably schedule for surgery. \"    Objective:     Pain/Symptom Presentation:   Pain at rest: 0/10 (low back), 3-4/10 (left hip), 6/10 (leg)   Pain at worst: 1-2/10 (low back)    Treatment:    Therapeutic Exercise: x 18min  HEP: cat/camel stretch, prone press up, supine wipers, S/L thoracic rotation, seated lumbar flex stretch, SLR, standing marching, and sit to stands. Prone quad stretch: x 30\" (B)   Manual hamstring stretch: x 30\" (B)   Manual hip flexor stretch off EOB: x 30\" (B)   Hip PROM - IR/ER x 10   Supine wipers: 1 x 10 (B)  Sidelying thoracic rotation: 1 x 10 (B)   Seated lumbar flex stretch: x 30\"   SLR: 2 x 10 (B)  LAQ: x 40 (B), 8# ankle weight    Manual Therapy: x 22min  Lumbar PA accessory joint mobs - prone: L1-L5 - Grade 3, 4 x 10\" each  Thoracic PA accessory joint mobs - prone:T1-T12 - Grade 3, 2 x 10\" each  Soft tissue mobilization - prone: low back musculature and soft tissue - lumbar paraspinals, glutes, thoracolumbar fascia, QL   Soft tissue mobilization with runner's stick: (B) quad, hip flexor     Assessment: Ed reports improved low back pain management since his epidural, however he is still with complaints of tightness in both of his legs.  Despite the patient's improved low back pain, his general symptoms and complaints persist, even with physical therapy treatment. The patient remains compliant with is HEP. The patient has two visits left of his current script, for which we requested to extended the expiration date. We plan to reduce frequency to once every other week to lengthen episode of care. We discussed possible to progression to discharge at that point based on the slowing functional progress and will continue to assess over the next few weeks. The patient is still ultimately anticipating the need for surgery based on limited relief from the epidural injections and persistent symptoms and functional limitations. Goals:   Short-Term Goals:  Patient will improve low back and hip mobility as well as core strength to allow for intermittent pain-free forward bending to reach for and  objects up from the floor. Timeframe: 4 weeks. (MET 2/8/23)  Patient will improve low back and hip mobility to facilitate erect standing and walking after prolonged sitting of 2 hour(s). Timeframe: 4 weeks. (NOT YET MET 5/4/23)  Long-Term Goals:  Patient will improve low back mobility and postural endurance to maintain proper posture for prolonged standing of 2-3 hours for household activities and community integration. Timeframe: 8 weeks. (NOT YET MET 5/4/23)  Patient will improve core strength and LE strength to facilitate lifting/pushing/pulling objects up to 50lbs for household chores and yardwork. Timeframe: 12 weeks. (NOT YET MET 5/4/23)  Patient will improve static and dynamic balance and foot/ankle stabilization to facilitate SLS for > 15 sec for safety with community ambulation. Timeframe: 12 weeks. (NOT YET MET 5/4/23)  Patient will improve low back pain and core endurance to facilitate walking distances of 2 miles daily to facilitate household ambulation and community ambulation. Timeframe: 12 weeks.  (NOT YET MET 5/4/23)    Plan: Reduce frequency to once every other week. HEP: Patient was issued a HEP handout 8/30/23 including cat/camel stretch, prone press up, supine wipers, S/L thoracic rotation, seated lumbar flex stretch, SLR, standing marching, and sit to stands.      Charges: MT x 2, Therex x 1  Total Timed Treatment: 40min    Total Treatment Time: 40min

## 2023-09-21 RX ORDER — GABAPENTIN 300 MG/1
300 CAPSULE ORAL 3 TIMES DAILY
Qty: 270 CAPSULE | Refills: 1 | Status: SHIPPED | OUTPATIENT
Start: 2023-09-21

## 2023-09-21 RX ORDER — EMPAGLIFLOZIN 25 MG/1
1 TABLET, FILM COATED ORAL DAILY
Qty: 90 TABLET | Refills: 1 | Status: SHIPPED | OUTPATIENT
Start: 2023-09-21

## 2023-09-21 RX ORDER — LIRAGLUTIDE 6 MG/ML
INJECTION SUBCUTANEOUS
Qty: 27 ML | Refills: 1 | Status: SHIPPED | OUTPATIENT
Start: 2023-09-21

## 2023-09-21 NOTE — TELEPHONE ENCOUNTER
[facility]:[id number]      NOV:Due in 11/2023    Last labs 08/08/2023. Pt due again in November for labs.

## 2023-09-21 NOTE — TELEPHONE ENCOUNTER
From: Rama Hernandez To: Crescencio Hunter  Sent: 9/20/2023 3:56 PM CDT  Subject: Transition from Pat to your care. Dr. Branden Hunter, I look forward to seeing you soon. In the meantime, I have a number of scripts that Amairani Cardenas wrote me that need refills. The mail order pharmacy [facility] that I use, reached out to Amairani Cardenas and of course got no response. Would it be possible for you to either re-write the scripts or have you set as my new PCP? I fear it will be easier said than done. Please let me know if there are any issues. For what it is worth Roxana Sewell was able to renew one for me recently. I appreciate you assistance and will make an appointment to see you shortly. Thank you so much.   Jair Guillen

## 2023-09-22 NOTE — TELEPHONE ENCOUNTER
LOV: 8/8/23 KK  Next OV: Labs in 3 months with OV at that time. (Around 11/8/23)  Last Refill:7/3/23    atorvastatin 10 MG Oral Tab 90 tablet 0 7/3/2023    Sig:   Take 1 tablet (10 mg total) by mouth nightly. Please authorize if acceptable. Thank you!

## 2023-09-23 RX ORDER — ATORVASTATIN CALCIUM 10 MG/1
10 TABLET, FILM COATED ORAL NIGHTLY
Qty: 90 TABLET | Refills: 1 | Status: SHIPPED | OUTPATIENT
Start: 2023-09-23

## 2023-10-04 ENCOUNTER — TELEMEDICINE (OUTPATIENT)
Dept: PAIN CLINIC | Facility: CLINIC | Age: 63
End: 2023-10-04
Payer: COMMERCIAL

## 2023-10-04 ENCOUNTER — OFFICE VISIT (OUTPATIENT)
Dept: PHYSICAL THERAPY | Age: 63
End: 2023-10-04
Attending: NEUROLOGICAL SURGERY
Payer: COMMERCIAL

## 2023-10-04 DIAGNOSIS — M54.16 LUMBAR RADICULITIS: Primary | ICD-10-CM

## 2023-10-04 DIAGNOSIS — M54.16 LUMBAR RADICULOPATHY: ICD-10-CM

## 2023-10-04 PROCEDURE — 97140 MANUAL THERAPY 1/> REGIONS: CPT

## 2023-10-04 PROCEDURE — 97110 THERAPEUTIC EXERCISES: CPT

## 2023-10-04 PROCEDURE — 99214 OFFICE O/P EST MOD 30 MIN: CPT | Performed by: ANESTHESIOLOGY

## 2023-10-04 NOTE — PROGRESS NOTES
PHYSICAL THERAPY DISCHARGE:      Date of Service: 10/4/2023    Dx: Lumbar radiculopathy (M54.16)          Insurance: [address] [place] [address] [employer] Limits: 60 visit limit, auth required  Visit #: 32  Authorized # of Visits: 27   POC/Auth Expiration: 9/30/2023  Date of Last PN: 5/10/23 (Visit #15)  Authorizing Physician/Provider: Kali Solitario MD visit: N/A  Fall Risk: Standard         Precautions: None            Subjective: \"I had a visit with Dr. Paul Acevedo and they said that there's no need to try another epidural, that I need surgery. \" The patient reports that his left leg is now tighter even than before the epidural. \"I've been relying on the pain pills more recently. I haven't had a change to talk to the neurologist yet. \"     Objective:     Pain/Symptom Presentation:   Pain at rest: 0/10 (low back), 3-4/10 (left hip), 6/10 (leg)   Pain at worst: 1-2/10 (low back)    Gait: Patient demonstrates antalgic gait pattern. Strength/MMT:          Hip Motion Strength    Right Left   Hip Flexion 5/5 4-/5   Hip Extension 4/5 4-/5   Hip ABD 4/5 4-/5   Hip ER 4/5 4/5   Hip IR  5/5 5/5   *indicates activity was associated with pain              Knee Motion Strength    Right Left   Knee Extension 4/5 4-/5   Knee Flexion 4/5 4/5   *indicates activity was associated with pain             Ankle Motion Strength    Right Left   Ankle OKC DF 5/5 4-/5   Ankle OKC PF 5/5 4+/5   *indicates activity was associated with pain     Treatment:    Therapeutic Exercise: x 18min  HEP: cat/camel stretch, prone press up, supine wipers, S/L thoracic rotation, seated lumbar flex stretch, SLR, standing marching, and sit to stands.    Prone quad stretch: x 30\" (B)   Manual hamstring stretch: x 30\" (B)   Manual hip flexor stretch off EOB: x 30\" (B)   Hip PROM - IR/ER x 10   Supine wipers: 1 x 10 (B)  Sidelying thoracic rotation: 1 x 10 (B)   Seated lumbar flex stretch: x 30\"   SLR: 2 x 10 (B)  LAQ: x 40 (B), 8# ankle weight    Manual Therapy: x 22min  Lumbar PA accessory joint mobs - prone: L1-L5 - Grade 3, 4 x 10\" each  Thoracic PA accessory joint mobs - prone:T1-T12 - Grade 3, 2 x 10\" each  Soft tissue mobilization - prone: low back musculature and soft tissue - lumbar paraspinals, glutes, thoracolumbar fascia, QL   Soft tissue mobilization with runner's stick: (B) quad, hip flexor     Assessment: Ed is a 57year old male that presented to physical therapy evaluation s/p left L4-L5 fragmentectomy and previous diagnosis of Charcot of the left foot requiring three surgical interventions. The patient reported right-sided low back pain as well as remaining left myelopathy affecting the L4-L5 myotome, limiting hip flexor and quad strength. The patient has been seen for 26 sessions in physical therapy. The patient recently has had two epidurals, both only with temporary relief in pain. In the past two weeks, the patients LBP and tightness complaints of his left and right thigh and quad have increased. The patient has been suggested to surgical intervention. The patient is only experiencing temporary relief with physical therapy treatment. Due to the anticipated need for surgery, the patient will be discharged from therapy at this time to continue with HEP up until surgery. Goals:   Short-Term Goals:  Patient will improve low back and hip mobility as well as core strength to allow for intermittent pain-free forward bending to reach for and  objects up from the floor. Timeframe: 4 weeks. (MET 2/8/23)  Patient will improve low back and hip mobility to facilitate erect standing and walking after prolonged sitting of 2 hour(s). Timeframe: 4 weeks. (NOT YET MET 5/4/23)  Long-Term Goals:  Patient will improve low back mobility and postural endurance to maintain proper posture for prolonged standing of 2-3 hours for household activities and community integration. Timeframe: 8 weeks.  (NOT MET 10/4/23)  Patient will improve core strength and LE strength to facilitate lifting/pushing/pulling objects up to 50lbs for household chores and yardwork. Timeframe: 12 weeks. (NOT MET 10/4/23)  Patient will improve static and dynamic balance and foot/ankle stabilization to facilitate SLS for > 15 sec for safety with community ambulation. Timeframe: 12 weeks. (NOT MET 10/4/23)  Patient will improve low back pain and core endurance to facilitate walking distances of 2 miles daily to facilitate household ambulation and community ambulation. Timeframe: 12 weeks. (NOT MET 10/4/23)    Plan: Patient will be discharged from therapy to continue with HEP. HEP: Patient was issued a HEP handout 8/30/23 including cat/camel stretch, prone press up, supine wipers, S/L thoracic rotation, seated lumbar flex stretch, SLR, standing marching, and sit to stands.      Charges: MT x 2, Therex x 1  Total Timed Treatment: 40min    Total Treatment Time: 40min

## 2023-10-04 NOTE — PROGRESS NOTES
Telehealth outside of [address] Verbal Consent   I conducted a telehealth visit with Sandra Roberts today, 10/04/23, which was completed using two-way, real-time interactive audio and video communication. This has been done in good emelyn to provide continuity of care in the best interest of the provider-patient relationship, due to the COVID -19 public health crisis/national emergency where restrictions of face-to-face office visits are ongoing. Every conscious effort was taken to allow for sufficient and adequate time to complete the visit. The patient was made aware of the limitations of the telehealth visit, including treatment limitations as no physical exam could be performed. The patient was advised to call 911 or to go to the ER in case there was an emergency. The patient was also advised of the potential privacy & security concerns related to the telehealth platform. The patient was made aware of where to find [facility] notice of privacy practices, telehealth consent form and other related consent forms and documents. which are located on the [facility] website. The patient verbally agreed to telehealth consent form, related consents and the risks discussed. Lastly, the patient confirmed that they were in [place]. Included in this visit, time may have been spent reviewing labs, medications, radiology tests and decision making. Appropriate medical decision-making and tests are ordered as detailed in the plan of care above. Coding/billing information is submitted for this visit based on complexity of care and/or time spent for the visit. Name: Sandra Roberts : 1960   DOS: 10/4/2023     Pain Clinic Follow Up Visit:   No chief complaint on file. Sandra Roberts is a 70year old male with a history of lumbar radiculitis here for follow-up after lumbar epidural steroid injection. Patient reports relief for approximately 1 week.   Now unfortunately has return of symptoms. Pt denies any chills, fever, or weakness. There is no bladder or bowel incontinence associated with the pain. REVIEW OF SYSTEMS:  A ten point review of systems was performed with pertinent positives and negatives in the HPI. No Known Allergies    Current Outpatient Medications   Medication Sig Dispense Refill    atorvastatin 10 MG Oral Tab Take 1 tablet (10 mg total) by mouth nightly. 90 tablet 1    Empagliflozin (JARDIANCE) 25 MG Oral Tab Take 1 tablet by mouth daily. 90 tablet 1    gabapentin 300 MG Oral Cap Take 1 capsule (300 mg total) by mouth in the morning and 1 capsule (300 mg total) at noon and 1 capsule (300 mg total) in the evening. 270 capsule 1    Liraglutide (VICTOZA) 18 MG/3ML Subcutaneous Solution Pen-injector Inject 1.8 mg under the skin daily 27 mL 1    traMADol 50 MG Oral Tab Take 1 tablet (50 mg total) by mouth every 6 (six) hours as needed for Pain. 40 tablet 0    metFORMIN HCl 1000 MG Oral Tab Take 1 tablet (1,000 mg total) by mouth 2 (two) times daily with meals. 180 tablet 0    orphenadrine  MG Oral Tablet 12 Hr Take 100 mg by mouth 2 (two) times daily. 120 tablet 0    LOSARTAN 100 MG Oral Tab TAKE 1 TABLET BY MOUTH DAILY 90 tablet 1    BD PEN NEEDLE KATY 2ND GEN 32G X 4 MM Does not apply Misc USE 1 DAILY 100 each 3    cyclobenzaprine 10 MG Oral Tab Take 1 tablet (10 mg total) by mouth 3 (three) times daily as needed for Muscle spasms. (Patient not taking: Reported on 6/16/2023) 60 tablet 1    aspirin 81 MG Oral Tab EC Take 1 tablet (81 mg total) by mouth daily. Glucose Blood (CONTOUR NEXT TEST) In Vitro Strip Pt testing bid Pt uses contour next meter 200 each 3    Alcohol Swabs (ALCOHOL PREP) Does not apply Pads Use daily with victoza 90 each 3    FAMOTIDINE OR Take 1 tablet by mouth daily as needed. EXAM:   There were no vitals taken for this visit. General:  Patient is a(n) 70year old year old male in no acute distress.   Neurologic[de-identified] WNL-Orientation to time, place and person, normal mood & affect, concentration & attention span intact. Inspection:  Ambulates with well-coordinated, fluid, non-antalgic gait. Gait is normal.  Neck: Full range of motion  Back: Gait intact       IMAGES:     Lumbar MRI reviewed independently with patient again. Evidence of a central canal and foraminal stenosis    ASSESSMENT AND PLAN:   Lumbar radiculitis  (primary encounter diagnosis)  Lumbar radiculopathy    The patient is a pleasant 15-year-old gentleman with a history of lumbar disc disease leading to central canal and neuroforaminal stenosis. Unfortunately, has not had long-term relief following epidural injection. He has return of symptoms after approximately 1 week. Therefore, did refer patient to Dr. Yumiko Ruiz to explore surgical decompressive options. Patient has some questions regarding acupuncture. He will defer formal referral until after seeking neurosurgical input. Radiology orders and consultations:NEUROSURGERY - INTERNAL  The patient indicates understanding of these issues and agrees to the plan. No follow-ups on file.     Manisha Gutiérrez MD, 10/4/2023, 10:38 AM

## 2023-10-07 ENCOUNTER — PATIENT MESSAGE (OUTPATIENT)
Dept: SURGERY | Facility: CLINIC | Age: 63
End: 2023-10-07

## 2023-10-09 DIAGNOSIS — M54.16 LUMBAR RADICULOPATHY, ACUTE: ICD-10-CM

## 2023-10-09 RX ORDER — TRAMADOL HYDROCHLORIDE 50 MG/1
50 TABLET ORAL EVERY 6 HOURS PRN
Qty: 40 TABLET | Refills: 0 | OUTPATIENT
Start: 2023-10-09

## 2023-10-09 NOTE — TELEPHONE ENCOUNTER
Noted the provider message listed below. Routed to the front office to schedule pt for an appt with either Dr. Nano Adams or Facundo Mcnair. Floresita Pereira on a say that Dr. Nano Adams is in clinic.

## 2023-10-09 NOTE — TELEPHONE ENCOUNTER
LOV: 08/08/2023    Last Refill:   Medication Quantity Refills Start End   traMADol 50 MG Oral Tab 40 tablet 0 8/29/2023      RTC: has appt on 10/17/2023 w/ Dr. Naye Gao    Protocol: n/a    Refill pended. Please approve if okay. Thank you.

## 2023-10-09 NOTE — TELEPHONE ENCOUNTER
Received message from patient inquiring about next steps with Dr. Ana Rosa Nunez, as he has undergone VITO and pain relief had subsided after 8 days. On 9/14/23 with Dr. Meridee Cushing:    LUMBAR INTERLAMINAR EPIDURAL INJECTION     Per PT note on 10/4/23:    \"Subjective: \"I had a visit with Dr. Meridee Cushing and they said that there's no need to try another epidural, that I need surgery. \" The patient reports that his left leg is now tighter even than before the epidural. \"I've been relying on the pain pills more recently. I haven't had a change to talk to the neurologist yet. \"      Routed to CARON Ruvalcaba.

## 2023-10-09 NOTE — TELEPHONE ENCOUNTER
This is a duplicate message also sent to Dr. Christ Irene. Message had been routed to [facility]. CARON Schreiber.

## 2023-10-10 ENCOUNTER — TELEPHONE (OUTPATIENT)
Dept: SURGERY | Facility: CLINIC | Age: 63
End: 2023-10-10

## 2023-10-10 NOTE — TELEPHONE ENCOUNTER
Patient was advised next step is to follow up with Neurosurgery.    LOV10/4/23- The patient is a pleasant 63-year-old gentleman with a history of lumbar disc disease leading to central canal and neuroforaminal stenosis.  Unfortunately, has not had long-term relief following epidural injection.  He has return of symptoms after approximately 1 week.  Therefore, did refer patient to Dr. Blount to explore surgical decompressive options.  Patient has some questions regarding acupuncture.  He will defer formal referral until after seeking neurosurgical input.

## 2023-10-10 NOTE — TELEPHONE ENCOUNTER
Reached out to patient offered appointment on 10/17 at 10am with Sonia.  Dr. Lozano will be pulled into appointment.  Pt concern what is next step after seeing pain group.  If patient calls back please assist in scheduling appointment with Sonia.

## 2023-10-17 ENCOUNTER — OFFICE VISIT (OUTPATIENT)
Dept: FAMILY MEDICINE CLINIC | Facility: CLINIC | Age: 63
End: 2023-10-17
Payer: COMMERCIAL

## 2023-10-17 ENCOUNTER — TELEPHONE (OUTPATIENT)
Dept: SURGERY | Facility: CLINIC | Age: 63
End: 2023-10-17

## 2023-10-17 ENCOUNTER — OFFICE VISIT (OUTPATIENT)
Dept: SURGERY | Facility: CLINIC | Age: 63
End: 2023-10-17
Payer: COMMERCIAL

## 2023-10-17 VITALS
OXYGEN SATURATION: 97 % | DIASTOLIC BLOOD PRESSURE: 76 MMHG | RESPIRATION RATE: 18 BRPM | HEART RATE: 87 BPM | WEIGHT: 267.31 LBS | HEIGHT: 76 IN | SYSTOLIC BLOOD PRESSURE: 126 MMHG | TEMPERATURE: 98 F | BODY MASS INDEX: 32.55 KG/M2

## 2023-10-17 VITALS
BODY MASS INDEX: 32.27 KG/M2 | WEIGHT: 265 LBS | HEART RATE: 80 BPM | DIASTOLIC BLOOD PRESSURE: 76 MMHG | HEIGHT: 76 IN | SYSTOLIC BLOOD PRESSURE: 128 MMHG

## 2023-10-17 DIAGNOSIS — M48.062 LUMBAR STENOSIS WITH NEUROGENIC CLAUDICATION: ICD-10-CM

## 2023-10-17 DIAGNOSIS — Z00.00 HEALTHCARE MAINTENANCE: ICD-10-CM

## 2023-10-17 DIAGNOSIS — M54.16 LUMBAR RADICULOPATHY, ACUTE: ICD-10-CM

## 2023-10-17 DIAGNOSIS — Z98.890 S/P LUMBAR MICRODISCECTOMY: ICD-10-CM

## 2023-10-17 DIAGNOSIS — Z98.890 S/P LUMBAR MICRODISCECTOMY: Primary | ICD-10-CM

## 2023-10-17 DIAGNOSIS — I10 ESSENTIAL HYPERTENSION: ICD-10-CM

## 2023-10-17 DIAGNOSIS — M47.816 FACET ARTHROPATHY, LUMBAR: ICD-10-CM

## 2023-10-17 DIAGNOSIS — M43.16 SPONDYLOLISTHESIS OF LUMBAR REGION: ICD-10-CM

## 2023-10-17 DIAGNOSIS — E11.42 TYPE 2 DIABETES MELLITUS WITH POLYNEUROPATHY (HCC): ICD-10-CM

## 2023-10-17 DIAGNOSIS — M54.16 LUMBAR RADICULOPATHY, ACUTE: Primary | ICD-10-CM

## 2023-10-17 PROBLEM — E11.65 TYPE 2 DIABETES MELLITUS WITH HYPERGLYCEMIA, WITHOUT LONG-TERM CURRENT USE OF INSULIN (HCC): Status: RESOLVED | Noted: 2021-10-31 | Resolved: 2023-10-17

## 2023-10-17 PROBLEM — E78.5 DYSLIPIDEMIA ASSOCIATED WITH TYPE 2 DIABETES MELLITUS  (HCC): Status: RESOLVED | Noted: 2021-10-31 | Resolved: 2023-10-17

## 2023-10-17 PROBLEM — E11.69 DYSLIPIDEMIA ASSOCIATED WITH TYPE 2 DIABETES MELLITUS (HCC): Status: RESOLVED | Noted: 2021-10-31 | Resolved: 2023-10-17

## 2023-10-17 PROBLEM — E11.69 DYSLIPIDEMIA ASSOCIATED WITH TYPE 2 DIABETES MELLITUS: Status: RESOLVED | Noted: 2021-10-31 | Resolved: 2023-10-17

## 2023-10-17 PROBLEM — E78.5 DYSLIPIDEMIA ASSOCIATED WITH TYPE 2 DIABETES MELLITUS: Status: RESOLVED | Noted: 2021-10-31 | Resolved: 2023-10-17

## 2023-10-17 PROBLEM — E11.69 DYSLIPIDEMIA ASSOCIATED WITH TYPE 2 DIABETES MELLITUS  (HCC): Status: RESOLVED | Noted: 2021-10-31 | Resolved: 2023-10-17

## 2023-10-17 PROBLEM — E78.5 DYSLIPIDEMIA ASSOCIATED WITH TYPE 2 DIABETES MELLITUS (HCC): Status: RESOLVED | Noted: 2021-10-31 | Resolved: 2023-10-17

## 2023-10-17 PROCEDURE — 3078F DIAST BP <80 MM HG: CPT | Performed by: NURSE PRACTITIONER

## 2023-10-17 PROCEDURE — 3008F BODY MASS INDEX DOCD: CPT | Performed by: FAMILY MEDICINE

## 2023-10-17 PROCEDURE — 3074F SYST BP LT 130 MM HG: CPT | Performed by: NURSE PRACTITIONER

## 2023-10-17 PROCEDURE — 90472 IMMUNIZATION ADMIN EACH ADD: CPT | Performed by: FAMILY MEDICINE

## 2023-10-17 PROCEDURE — 90677 PCV20 VACCINE IM: CPT | Performed by: FAMILY MEDICINE

## 2023-10-17 PROCEDURE — 3008F BODY MASS INDEX DOCD: CPT | Performed by: NURSE PRACTITIONER

## 2023-10-17 PROCEDURE — 99215 OFFICE O/P EST HI 40 MIN: CPT | Performed by: NURSE PRACTITIONER

## 2023-10-17 PROCEDURE — 3074F SYST BP LT 130 MM HG: CPT | Performed by: FAMILY MEDICINE

## 2023-10-17 PROCEDURE — 3078F DIAST BP <80 MM HG: CPT | Performed by: FAMILY MEDICINE

## 2023-10-17 PROCEDURE — 90750 HZV VACC RECOMBINANT IM: CPT | Performed by: FAMILY MEDICINE

## 2023-10-17 PROCEDURE — 90471 IMMUNIZATION ADMIN: CPT | Performed by: FAMILY MEDICINE

## 2023-10-17 PROCEDURE — 99387 INIT PM E/M NEW PAT 65+ YRS: CPT | Performed by: FAMILY MEDICINE

## 2023-10-17 RX ORDER — TRAMADOL HYDROCHLORIDE 50 MG/1
50 TABLET ORAL EVERY 6 HOURS PRN
Qty: 40 TABLET | Refills: 0 | Status: SHIPPED | OUTPATIENT
Start: 2023-10-17

## 2023-10-17 RX ORDER — LOSARTAN POTASSIUM 100 MG/1
100 TABLET ORAL DAILY
Qty: 90 TABLET | Refills: 1 | Status: SHIPPED | OUTPATIENT
Start: 2023-10-17

## 2023-10-17 NOTE — PROGRESS NOTES
Established patient:  Reason for follow up:   F/u after injection     Numeric Rating Scale:    Pain at Present:  5/10       Distribution of Pain: low back, left thigh/hip, right hip      Most recent treatments for Current Pain Condition:     Injection   Response to treatment: only 8 days of relief

## 2023-10-17 NOTE — TELEPHONE ENCOUNTER
You are scheduled for L4-L5 Laminectomies on 11.6.23 with Dr. Lozano at [facility].     PCP clearance is needed.  We have faxed a request for pre-op clearance to your PCP Dr Le. Please contact their office for appointment.    You will need  pre operative labs which will include MRSA/MSSA testing.  You will need to contact the Pre-admission department at [phone] to schedule an appointment for your labs.     The Pre-Admission nurse will review your health history and give you information from the hospital. The nurse will also get you scheduled for all your pre-op testing required for your surgery.     Do not take any blood thinning medications such as over the counter non-steroidal antiinflammatories (advil, aleve, ibuprofen etc.), herbal supplements (garlic,tumeric etc.), vitamin E, fish oil or krill oil for at least 7 days prior to surgery. You may only take Tylenol, Extra Strength Tylenol, Arthritis Tylenol, or prescription Norco or Tramadol for pain if something is needed.    You should have nothing to eat or drink after 11:00pm the night prior to surgery except for the following:    Do drink 12 ounces of regular Gatorade (NOT RED) 12 hours and 4 hours prior to your scheduled surgery time, Do not drink any other liquids (including water) before your surgery. Do not chew gum or eat candies before surgery.  Take 1000 mg of Tylenol (Acetaminophen) 4 hours before your scheduled surgery time, take this with your scheduled Gatorade.    In order to prevent infection, you will need to purchase Hibiclens soap and use it after your regular body soap for 5 days prior to your procedure.  The last shower should be the night before surgery.  This soap can be found at any pharmacy in the first aid section. See detailed instructions below.      Our office will get prior authorization for surgery through your insurance.     Surgery is usually scheduled as a 23 hour admission.  This is an estimate and varies from  person to person.  Ultimately, the surgeon will determine when you are ready to be discharged.    The hospital will contact you 1-2 days before surgery with your arrival time.     If you were on blood thinners (such as Coumadin, Pradaxa, Xarelto, etc) prior to surgery that we had you stop for surgery, please make sure you get instructions about when to resume the medication before you are discharged from the hospital.    Post operative appointments to be made 2 weeks before surgery as well as 2 and 6 weeks after surgery.      Your 2 week pre-op surgical telehealth appointment is on 10.25.23 at 10:00 am with Celia Munguia RN    Your 2 week post-op appointment is on 11.21.23 at 9:30 am with MICHAEL Sorenson    Your 6 week post-op appointment is on 12.19.23 at 3:00 pm with Dr Lozano      Restrictions for after surgery:  Lifting restriction of 10 pounds or less.  No bending or twisting.  No prolonged sitting or standing.  Driving is restricted for the first 1-2 weeks (this will vary from surgeon to surgeon.)  Fusion patients may require bracing such as TLSO or LSO brace. Braces are custom made to fit the patient.  Patient's are to keep their incisions covered for the first 3 days post surgery. After that they may leave the incision open to air. It is better for the healing process if the incision is left open to air.   May shower after the third day.  Do not scrub the incision and avoid any lotions or creams to the incision.    Please make sure to arrive at least 15 minutes prior to your scheduled appointment in order to get checked in and roomed in a timely manner.  Depending on provider availability, late patients may be required to reschedule.  REFILLS:  After surgery, please remember that we do have a 48 hour refill policy that does not include weekends, please make sure to request your medications in a timely manner so that you do not go days without medication.  *Refills should be requested through your  pharmacy or through the refill request in [employer] (log in, go to medications, then select refill request).  [employer] MESSAGING:  Please remember that our office is closed during the weekend and no one is available for [employer] messages. If you have an urgent or emergent matter please go to a walk-in center or the emergency room. Also please remember your [employer] messages are part of your legal medical record and should not be utilized as a personal email with our providers as it is visible to all [facility] employees. Also, Since [employer] messages are not for emergent matters it may be several days before there is a response to your message.  FMLA/PAPERWORK COMPLETED BY OUR MEDICAL FORMS DEPARTMENT:  If you require FMLA paperwork for your surgery, please make sure to either Drop it off or have it faxed to our office at [phone]. Make sure it has your NAME, , and has your signature. You will need to have a Release of Information on file. To facilitate this process we ask that you requested it at the  on your way out and sign it. Without a signed REBECCA or signature on the form we will not be able to fax it and this will cause a delay with your forms. Fees charged for forms are $25 for initial submission and $15 for recertifications. If you have questions on the status of your forms please call the forms department at [phone].  **We do have a 3 week policy for all forms and paperwork, please make sure to allow plenty of time for completion. Same day paperwork will not be completed. **    Spine Navigator  You will have a telehealth visit with our spine navigator approximately 2 weeks before your surgery. This class is NOT optional like the spine class listed below. This appointment will get booked when you schedule your spinal surgery.    Regarding current visitor information:    Please visit the following website for the detailed and up-to-date visitor screening and restriction policy at  [email or web address].    When speaking with Pre-admissions you will be told about a spine class as it relates to your surgery. Although the class is not mandatory, you are strongly encouraged to attend. Make sure to bring your surgical binder to the class     The Pre-op Spine Surgery Class is held at [facility] most Wednesdays from 3:30-4:30 pm.  Call Pre-admission Testing (PAT) to register at:  [phone].     Please park in the [facility] Parking garage, check in at registration and meet in the [facility] lobby for your escort to class on the Ortho Spine unit.     If unable to attend, class is available online at [email or web address].     If you have any questions or concerns please contact our office at [phone] #1 or via [employer] message.    Hibiclens Bathing  Hibiclens is a body soap that is used before surgery protect you from getting an infection after surgery   Hibiclens comes in a large blue bottle and can be found in most pharmacies in the First Aid supplies  Shower with this daily for FIVE consecutive days before surgery, using the entire bottle over the five days.  The last shower should be the night before surgery.   Steps to bathing with Hibiclens  Do not use Hibiclens on your hair, face or private areas  Wash your hair and face as normal with your usual cleansers  Rinse well  Using a clean wet washcloth apply enough Hibiclens to cover your body. Wash from the neck down avoiding the genital areas and concentrating on the surgical area  Rinse well  Dry yourself with a clean, dry towel  Do not use any powders, creams, lotions or sprays on your body as these attract bacteria  Deodorant and facial creams are acceptable.   (Laundering/cleaning: Chlorhexidine gluconate skin cleansers will cause stains if used with chlorine releasing products. Rinse completely and use only non-chlorine detergents.)    For Office Use Only:    Medical Clearances Needed:  PCP  [facility]  CPT Codes:

## 2023-10-19 ENCOUNTER — PATIENT OUTREACH (OUTPATIENT)
Dept: FAMILY MEDICINE CLINIC | Facility: CLINIC | Age: 63
End: 2023-10-19

## 2023-10-19 NOTE — TELEPHONE ENCOUNTER
Patient is scheduled for REDO LUMBAR 4, LUMBAR 5 LAMINECTOMIES AND ALL INDICATED PROCEDURES on 11.6.23 with Dr. Lozano at [facility].     NA pre-op apt scheduled (if sx is more than 30 days from last apt)  Y pre-op apt scheduled with RN spine navigator  Y Surgical instructions reviewed by nursing staff with patient  Y  form completed  Y Surgery order signed   Y Placed sx on surgery sheet  Y Placed on outlook calendar  Y [employer] message sent to patient with sx instructions  Y Faxed pre-op clearance request to PCP FABY   NA Faxed letter to prescribing provider requesting anticoagulants be held for surgery  NA E-mail sent to [employer]  Y Post-op appointments made  Y PA NEEDED. Routed to PA team to initiate.  Y Post-Op outreach pt reminder placed.   Y Entire Neurosurgery Checklist Completed

## 2023-10-19 NOTE — PROGRESS NOTES
Patient is due for fasting labs. Diabetic eye exam up to date until 12/23. [facility] sent to patient.

## 2023-10-25 ENCOUNTER — LABORATORY ENCOUNTER (OUTPATIENT)
Dept: LAB | Age: 63
End: 2023-10-25
Attending: NEUROLOGICAL SURGERY

## 2023-10-25 ENCOUNTER — HOSPITAL ENCOUNTER (OUTPATIENT)
Dept: GENERAL RADIOLOGY | Age: 63
Discharge: HOME | End: 2023-10-25
Attending: NEUROLOGICAL SURGERY

## 2023-10-25 ENCOUNTER — EKG ENCOUNTER (OUTPATIENT)
Dept: LAB | Age: 63
End: 2023-10-25
Attending: NEUROLOGICAL SURGERY

## 2023-10-25 ENCOUNTER — NURSE ONLY (OUTPATIENT)
Dept: SURGERY | Facility: CLINIC | Age: 63
End: 2023-10-25

## 2023-10-25 DIAGNOSIS — M54.16 LUMBAR RADICULOPATHY: ICD-10-CM

## 2023-10-25 DIAGNOSIS — E11.40 TYPE 2 DIABETES MELLITUS WITH DIABETIC NEUROPATHY, WITHOUT LONG-TERM CURRENT USE OF INSULIN (HCC): ICD-10-CM

## 2023-10-25 LAB
ALBUMIN SERPL-MCNC: 3.7 G/DL (ref 3.4–5)
ALBUMIN SERPL-MCNC: 3.8 G/DL (ref 3.4–5)
ALBUMIN/GLOB SERPL: 1 {RATIO} (ref 1–2)
ALBUMIN/GLOB SERPL: 1 {RATIO} (ref 1–2)
ALP LIVER SERPL-CCNC: 87 U/L
ALP LIVER SERPL-CCNC: 88 U/L
ALT SERPL-CCNC: 19 U/L
ALT SERPL-CCNC: 20 U/L
ANION GAP SERPL CALC-SCNC: 4 MMOL/L (ref 0–18)
ANION GAP SERPL CALC-SCNC: 5 MMOL/L (ref 0–18)
ANTIBODY SCREEN: NEGATIVE
APTT PPP: 27.8 SECONDS (ref 23.3–35.6)
AST SERPL-CCNC: 10 U/L (ref 15–37)
AST SERPL-CCNC: 10 U/L (ref 15–37)
ATRIAL RATE: 83 BPM
BASOPHILS # BLD AUTO: 0.05 X10(3) UL (ref 0–0.2)
BASOPHILS NFR BLD AUTO: 0.7 %
BILIRUB SERPL-MCNC: 0.3 MG/DL (ref 0.1–2)
BILIRUB SERPL-MCNC: 0.3 MG/DL (ref 0.1–2)
BUN BLD-MCNC: 27 MG/DL (ref 7–18)
BUN BLD-MCNC: 28 MG/DL (ref 7–18)
CALCIUM BLD-MCNC: 9.5 MG/DL (ref 8.5–10.1)
CALCIUM BLD-MCNC: 9.7 MG/DL (ref 8.5–10.1)
CHLORIDE SERPL-SCNC: 105 MMOL/L (ref 98–112)
CHLORIDE SERPL-SCNC: 107 MMOL/L (ref 98–112)
CHOLEST SERPL-MCNC: 134 MG/DL (ref ?–200)
CO2 SERPL-SCNC: 26 MMOL/L (ref 21–32)
CO2 SERPL-SCNC: 27 MMOL/L (ref 21–32)
CREAT BLD-MCNC: 1.32 MG/DL
CREAT BLD-MCNC: 1.42 MG/DL
EGFRCR SERPLBLD CKD-EPI 2021: 55 ML/MIN/1.73M2 (ref 60–?)
EGFRCR SERPLBLD CKD-EPI 2021: 60 ML/MIN/1.73M2 (ref 60–?)
EOSINOPHIL # BLD AUTO: 0.43 X10(3) UL (ref 0–0.7)
EOSINOPHIL NFR BLD AUTO: 6.2 %
ERYTHROCYTE [DISTWIDTH] IN BLOOD BY AUTOMATED COUNT: 12.7 %
EST. AVERAGE GLUCOSE BLD GHB EST-MCNC: 200 MG/DL (ref 68–126)
FASTING PATIENT LIPID ANSWER: YES
FASTING STATUS PATIENT QL REPORTED: YES
FASTING STATUS PATIENT QL REPORTED: YES
GLOBULIN PLAS-MCNC: 3.7 G/DL (ref 2.8–4.4)
GLOBULIN PLAS-MCNC: 3.8 G/DL (ref 2.8–4.4)
GLUCOSE BLD-MCNC: 183 MG/DL (ref 70–99)
GLUCOSE BLD-MCNC: 185 MG/DL (ref 70–99)
HBA1C MFR BLD: 8.6 % (ref ?–5.7)
HCT VFR BLD AUTO: 43.6 %
HDLC SERPL-MCNC: 48 MG/DL (ref 40–59)
HGB BLD-MCNC: 14.1 G/DL
IMM GRANULOCYTES # BLD AUTO: 0.01 X10(3) UL (ref 0–1)
IMM GRANULOCYTES NFR BLD: 0.1 %
INR BLD: 1.03 (ref 0.85–1.16)
LDLC SERPL CALC-MCNC: 64 MG/DL (ref ?–100)
LYMPHOCYTES # BLD AUTO: 1.66 X10(3) UL (ref 1–4)
LYMPHOCYTES NFR BLD AUTO: 24 %
MCH RBC QN AUTO: 29.3 PG (ref 26–34)
MCHC RBC AUTO-ENTMCNC: 32.3 G/DL (ref 31–37)
MCV RBC AUTO: 90.5 FL
MONOCYTES # BLD AUTO: 0.44 X10(3) UL (ref 0.1–1)
MONOCYTES NFR BLD AUTO: 6.4 %
NEUTROPHILS # BLD AUTO: 4.32 X10 (3) UL (ref 1.5–7.7)
NEUTROPHILS # BLD AUTO: 4.32 X10(3) UL (ref 1.5–7.7)
NEUTROPHILS NFR BLD AUTO: 62.6 %
NONHDLC SERPL-MCNC: 86 MG/DL (ref ?–130)
OSMOLALITY SERPL CALC.SUM OF ELEC: 294 MOSM/KG (ref 275–295)
OSMOLALITY SERPL CALC.SUM OF ELEC: 294 MOSM/KG (ref 275–295)
P AXIS: 62 DEGREES
P-R INTERVAL: 156 MS
PLATELET # BLD AUTO: 239 10(3)UL (ref 150–450)
POTASSIUM SERPL-SCNC: 4.1 MMOL/L (ref 3.5–5.1)
POTASSIUM SERPL-SCNC: 4.1 MMOL/L (ref 3.5–5.1)
PROT SERPL-MCNC: 7.5 G/DL (ref 6.4–8.2)
PROT SERPL-MCNC: 7.5 G/DL (ref 6.4–8.2)
PROTHROMBIN TIME: 13.6 SECONDS (ref 11.6–14.8)
Q-T INTERVAL: 346 MS
QRS DURATION: 82 MS
QTC CALCULATION (BEZET): 406 MS
R AXIS: 14 DEGREES
RBC # BLD AUTO: 4.82 X10(6)UL
RH BLOOD TYPE: POSITIVE
SODIUM SERPL-SCNC: 137 MMOL/L (ref 136–145)
SODIUM SERPL-SCNC: 137 MMOL/L (ref 136–145)
T AXIS: 57 DEGREES
TRIGL SERPL-MCNC: 125 MG/DL (ref 30–149)
VENTRICULAR RATE: 83 BPM
VLDLC SERPL CALC-MCNC: 19 MG/DL (ref 0–30)
WBC # BLD AUTO: 6.9 X10(3) UL (ref 4–11)

## 2023-10-25 PROCEDURE — 87081 CULTURE SCREEN ONLY: CPT

## 2023-10-25 PROCEDURE — 83036 HEMOGLOBIN GLYCOSYLATED A1C: CPT

## 2023-10-25 PROCEDURE — 80061 LIPID PANEL: CPT

## 2023-10-25 PROCEDURE — 85730 THROMBOPLASTIN TIME PARTIAL: CPT

## 2023-10-25 PROCEDURE — 86900 BLOOD TYPING SEROLOGIC ABO: CPT

## 2023-10-25 PROCEDURE — 93010 ELECTROCARDIOGRAM REPORT: CPT | Performed by: INTERNAL MEDICINE

## 2023-10-25 PROCEDURE — 80053 COMPREHEN METABOLIC PANEL: CPT

## 2023-10-25 PROCEDURE — 85610 PROTHROMBIN TIME: CPT

## 2023-10-25 PROCEDURE — 86901 BLOOD TYPING SEROLOGIC RH(D): CPT

## 2023-10-25 PROCEDURE — 85025 COMPLETE CBC W/AUTO DIFF WBC: CPT

## 2023-10-25 PROCEDURE — 71046 X-RAY EXAM CHEST 2 VIEWS: CPT | Performed by: NEUROLOGICAL SURGERY

## 2023-10-25 PROCEDURE — 93005 ELECTROCARDIOGRAM TRACING: CPT

## 2023-10-25 PROCEDURE — 86850 RBC ANTIBODY SCREEN: CPT

## 2023-10-25 PROCEDURE — 36415 COLL VENOUS BLD VENIPUNCTURE: CPT

## 2023-10-25 NOTE — PROGRESS NOTES
RN Spine Navigator Education for Estefania Rivers     Patient is scheduled for a Lumbar decompression with Dr. Loly Rasmussen on 11/06/2023. Patient attended spine navigator visit independently. Care partner is Katerine. Reviewed expectation that the care partner must be able to provide transportation to and from the hospital. They must help at home for the first week after discharge, including assistance with meals, medication, and dressing changes. It is strongly recommended that someone stays with the patient for 24 hours after anesthesia if going home the day of surgery. Patient Surgical Goals: increased stamina, be able to take the dog on a walk    Before Your Surgery    Prehab and Clearance  Appointments include labs, nasal swab, imaging, EKG, PCP/cardiac clearance. Patient has also been to pain management, PT. Make sure to complete all preadmission testing so that surgery does not get delayed. Home Environment  Assessed home status: home has stairs and bathroom and bedroom are upstairs, and has pets. It is recommended that you prepare your home by putting clean sheets on bed, freezing meals, and putting frequently used items at waist level. Prevent falls by removing items that could cause you to trip, adding nightlights and adding a nonskid mat in shower. Assistive devices can be purchased at a medical supply store including canes, walkers, toileting devices, long handled sponge, shower chair or tub transfer bench, reacher, sock aid, long handled shoehorn, if needed. Smoking Cessation   Not applicable    Medications to Stop   Do not take any blood thinning medications such as non-steroidal anti-inflammatories (Advil, Aleve, ibuprofen, meloxicam etc.), aspirin (unless told otherwise by your cardiologist), herbal supplements (garlic, turmeric etc.), vitamin E, fish oil or krill oil for at least 7 days prior to surgery. You may only take Tylenol or prescribed narcotic medication if needed for pain.  Other medications to stop include: anticoagulants/antiplatelet per surgeon or prescribing PCP order, appetite suppressants GLP-1 day 24 hours before for daily or a week for weekly, hold oral DM medications day of surgery but check with providers about insulin, hold SGLT2 meds for 4 days, and hold ACE/ARBS day of surgery    Leading Up to Day of Surgery  Five days before surgery do Hibiclens soap after your regular body soap. Your last shower should be the night before surgery and use Bactroban if prescribed  Do not shave the surgical area before surgery. One-two business days before surgery, the preadmission testing staff will call you and let you know when to arrive, where to park, when to take your Tylenol and Gatorade, and will provide any additional instructions. After 11pm the day before surgery, do not eat or drink anything including water, gum, or candies. Drink 12 ounces of regular Gatorade (NOT RED) 12 hours prior to your scheduled surgery time. Drink your second 12 ounces of regular Gatorade and take 1000 mg of Tylenol (acetaminophen) 4 hours before your scheduled surgery time. Items to Bring to the [facility] card, ID, advanced directive or medical power of  paperwork, loose fitting clothing, closed toed shoes with a back and a rubber sole that can accommodate swollen feet, long phone charging cord, glasses, dentures, and CPAP machine . Do not bring jewelry, valuables, or medications. If you take an uncommon medication that the hospital may not have, it must be brought to the hospital in the original container. In the Hospital     Operative Day and Hospital Stay  In the preoperative area, you will change into a gown, have an IV placed in your hand or arm by the nurse and sign any consent paperwork that is needed for your procedure.  You will speak to the surgeon and anesthesiologist. It is important to tell the doctors and nurses if you have had any significant side effects from pain medications or anesthesia such as a rash or severe nausea. In the operating room, the anesthesiologist will hook you up to monitors, give you oxygen through a mask, and give you medicine through the IV to fall asleep. After you are sleeping, the breathing tube will be placed, and you will be positioned on the operating table. You will wake up on the stretcher. During the procedure, the care partner can sit in the surgical waiting area. There are TV screens that say where the patient is in the surgical process. If the procedure is at [facility], there is a liaison that will provide the care partner with updates about the patient. In the recovery room, you will be hooked to monitors and given medications for pain and nausea as needed. While you may not remember this part, a nurse is with you and constantly checking on your condition. You may have a ott catheter to empty your bladder or a drain coming from your surgical site when you wake up depending on the type and length of surgery. Your family is not generally allowed in the recovery room. When you have met criteria to leave the recovery room, you will go to the same day surgery discharge area. Your family can sit with you there as you continue to wake up, have something to eat and drink, and get your discharge instructions. You can take pain pills in this area if needed OR you will be transported on your bed or stretcher to the inpatient unit accompanied by your family once a room is clean and available. On the inpatient unit, a team of doctors and advanced practice providers will manage your care. Ortho-spine nurses will check your vital signs and give you medications. Medications given in the hospital include antibiotics, IV and oral pain medications, muscle relaxers, stool softeners, and your home medications.  You may have orders for labs and x-rays while you are in the hospital. Physical and occupational therapists and  may also be a part of your care team.     [facility]   The average hospital stay is one to three days. Physical therapy and occupational therapy may evaluate you in the hospital for strength and ability to go home safely. Home health nurses and/or therapists may be recommended to provide additional help after discharge. The best place to recover is your own home, but if you need more assistance than home health can provide, case management can help to get approval for rehab. Preventing surgical complications  Blood clots/pulmonary embolism: ankle pumps, walking, SCDs  Infection: presurgical hibiclens baths, antibiotics, hand washing, don't touch incision  Pneumonia: incentive spirometer, cough and deep breathe  Nausea/vomiting: start with liquids and small meals, do not take pills on an empty stomach  Constipation: stay hydrated, walk frequently  Tell your nurse if you are experiencing nausea, vomiting or constipation as they have medications to help treat these. Additional surgical complications  Other risks include side effects from anesthesia, spinal cord or nerve damage (sensory loss, paralysis, bowel or bladder incontinence), spinal fluid leak, bleeding, hoarseness/swallowing problems, unimproved or increased symptoms, eye injury, stroke, and death. At home     Understanding Pain After Surgery  We will do our best to manage your pain after surgery, but it is not possible to be completely pain free. There will be operative pain in your back. Pain in the extremities is usually one of the first symptoms to improve. Numbness, weakness, and tingling should improve over time as your nerves heal, however, there can be a temporary increase in symptoms in the first few days due to inflammation. Pain medications will be prescribed to take home as discharge. If you are at [facility], your prescriptions can be filled by our pharmacy and brought to you bedside.  We encourage you to use the medication prescribed to you after your surgery, but please take the lowest possible dose to manage your pain. Taking high doses of narcotics can cause side effects. Transition to plain Tylenol when your pain improves. You may get more continuous pain relief by alternating between medications if you have multiple instead of taking them at the same time (Example: Narcotic at 9am, muscle relaxer at 12pm, narcotic at 3pm, muscle relaxer at 6pm). Write down when you have taken a medication as it may be difficult to remember after a few doses. Post operative medication   Tylenol (acetaminophen): take for pain. Do not take more than 3000mg-4000mg per day because it can damage your liver. Narcotics: take for moderate to severe pain. Some narcotic medications (Norco, Tylenol #3, Percocet, Vicodin) contain Tylenol. Make sure to not exceed the maximum dose if you are taking additional Tylenol with these medications. Do not drink alcohol or drive while taking narcotics. Muscle relaxers: take for muscle cramping. These can cause drowsiness. NSAIDS (Advil, Aleve, ibuprofen, meloxicam etc.) or aspirin: Do not take these unless your physician says it is ok. For a fusion, it may be several months before you can take NSAIDS. Stool softeners: take to prevent constipation while you are taking narcotic medications. You can get these over the counter at the pharmacy. You may use laxatives, which are stronger, if needed. Request a refill through your pharmacy or through the refill request in [employer] (log in, go to medications, then select refill request) at least two business days before you run out of medication if you believe you will need more. Nonpharmacologic pain management   You may use ice on your incision for 20 minutes every hour to help with pain and swelling. Do not place ice directly on your skin. You can use heat for muscle spasm but do place heat directly on your incision. Make frequent position changes.  You can do gentle stretching while avoiding significant bending or twisting. Use deep breathing techniques and distractions such as TV, reading, or games. Movement restrictions  After surgery, no bending, lifting, or twisting. Do not lift anything over 10lbs or lift anything over your shoulders. Avoid pulling or pushing. You may use stairs while holding the handrail. Talk to your doctor at your post-operative appointment to see if you can decrease your restrictions. You may not drive while taking narcotics or muscle relaxants. It is ok to ride in the car but refrain from driving or traveling long distances until cleared to do so by your physician. Post Op Exercise   Walk frequently using a walker if needed. Start with walking short distances and increase as you start to feel better. Do ankle pumps (bringing your feet towards your head then point them towards the ground) 15-20 times every hour when awake to help prevent blood clots. Ask your surgeon about more intense exercise activities at your post-operative appointment. You and your doctor will discuss how you are feeling as you heal and decide if outpatient physical therapy or a medical fitness referral is needed. Caring for your incision  Your incision will be closed with sutures under the skin and skin glue or Steri-Strips on top of the skin. Skin glue/Steri-Strips will come off on their own. The incision may be covered with a dressing that can come off after three days unless saturated with drainage before that. We prefer that you leave your incision open to air, but if it has drainage, you may place gauze and medical tape over it. Change the gauze and medical tape daily to check for signs of infection. Always wash your hands before touching your incision. You can shower three days after your surgery. We recommend the care partner be present during the first shower for safety. Let soapy water run over the incision, but do not scrub. Gently pat it dry after with a clean towel.  Do not apply any creams or lotions to the incision. Do not soak in a tub, pool, or any body of water until your incision is fully healed. Signs of Infection   Check daily for swelling, redness, drainage, pus, foul smell, or separation of the incision. When to Call for Assistance  Call 911 or go to the nearest emergency room if you experience chest pain, difficulty breathing, loss of bowel or bladder control, extreme drowsiness, or any other life-threatening situation. Call the Neurosurgery Office ([phone] Option #2) if you experience persistent nausea or vomiting, signs of infection, separation of incision, poor pain control despite using medication as directed or sudden increase in pain, temperature over 101F, difficulty swallowing, leg swelling, or with any concerns, unanswered questions, or new problems, such as new numbness/weakness/tingling. Follow-up Plan   Appointments with Dr. Loly Rasmussen or Jose Nguyễn at 2 and 6 weeks    Answered questions regarding: None     You can contact the RN Spine Navigator at [phone] or [email or web address] if additional questions arise regarding care. Please do not call the RN spine navigator for refills or for emergencies. Spine navigator spent 25 minutes conducting a virtual visit to provide education. Thank you for letting the RN Spine Navigator participate in your care.

## 2023-11-01 ENCOUNTER — TELEPHONE (OUTPATIENT)
Dept: FAMILY MEDICINE CLINIC | Facility: CLINIC | Age: 63
End: 2023-11-01

## 2023-11-01 NOTE — TELEPHONE ENCOUNTER
We received pre-op papers from Dr. Lozano office for the above patient for his upcoming surgery.  The pt is having L4-L5 redo laminectomies on 11/06/2023.  The patient recently had CPX on 10/17/2023.  Dr. Lozano placed all Labs, EKG and CXR completed.  The pt needs medical clearance.

## 2023-11-01 NOTE — TELEPHONE ENCOUNTER
PCP presurgical clearance received per OV note dated 10.17.23 in chart, \"The patient will be undergoing laminectomy of L4-L5 at this time he is a low risk for a moderate risk surgery he is cleared for surgery\"

## 2023-11-01 NOTE — TELEPHONE ENCOUNTER
Rec'd ov notes for physical on 10/17/2023 from Dr Le. Endorsed to nurse's bin for provider review.

## 2023-11-06 ENCOUNTER — APPOINTMENT (OUTPATIENT)
Dept: GENERAL RADIOLOGY | Facility: HOSPITAL | Age: 63
End: 2023-11-06
Attending: NEUROLOGICAL SURGERY
Payer: COMMERCIAL

## 2023-11-06 ENCOUNTER — HOSPITAL ENCOUNTER (OUTPATIENT)
Facility: HOSPITAL | Age: 63
Discharge: HOME | End: 2023-11-06
Attending: NEUROLOGICAL SURGERY | Admitting: NEUROLOGICAL SURGERY
Payer: COMMERCIAL

## 2023-11-06 ENCOUNTER — ANESTHESIA (OUTPATIENT)
Dept: SURGERY | Facility: HOSPITAL | Age: 63
End: 2023-11-06
Payer: COMMERCIAL

## 2023-11-06 ENCOUNTER — ANESTHESIA EVENT (OUTPATIENT)
Dept: SURGERY | Facility: HOSPITAL | Age: 63
End: 2023-11-06
Payer: COMMERCIAL

## 2023-11-06 VITALS
HEART RATE: 84 BPM | BODY MASS INDEX: 31.95 KG/M2 | DIASTOLIC BLOOD PRESSURE: 67 MMHG | HEIGHT: 76 IN | TEMPERATURE: 97 F | RESPIRATION RATE: 16 BRPM | WEIGHT: 262.38 LBS | OXYGEN SATURATION: 95 % | SYSTOLIC BLOOD PRESSURE: 136 MMHG

## 2023-11-06 DIAGNOSIS — M54.16 LUMBAR RADICULOPATHY: Primary | ICD-10-CM

## 2023-11-06 LAB
GLUCOSE BLD-MCNC: 219 MG/DL (ref 70–99)
GLUCOSE BLD-MCNC: 241 MG/DL (ref 70–99)

## 2023-11-06 PROCEDURE — 82962 GLUCOSE BLOOD TEST: CPT

## 2023-11-06 PROCEDURE — 01NB0ZZ RELEASE LUMBAR NERVE, OPEN APPROACH: ICD-10-PCS | Performed by: NEUROLOGICAL SURGERY

## 2023-11-06 PROCEDURE — 76000 FLUOROSCOPY <1 HR PHYS/QHP: CPT | Performed by: NEUROLOGICAL SURGERY

## 2023-11-06 RX ORDER — INSULIN ASPART 100 [IU]/ML
INJECTION, SOLUTION INTRAVENOUS; SUBCUTANEOUS
Status: COMPLETED
Start: 2023-11-06 | End: 2023-11-06

## 2023-11-06 RX ORDER — CEPHALEXIN 500 MG/1
500 CAPSULE ORAL 4 TIMES DAILY
Qty: 12 CAPSULE | Refills: 0 | Status: SHIPPED | OUTPATIENT
Start: 2023-11-06 | End: 2023-11-09

## 2023-11-06 RX ORDER — METOCLOPRAMIDE HYDROCHLORIDE 5 MG/ML
10 INJECTION INTRAMUSCULAR; INTRAVENOUS EVERY 8 HOURS PRN
Status: DISCONTINUED | OUTPATIENT
Start: 2023-11-06 | End: 2023-11-06

## 2023-11-06 RX ORDER — CEFAZOLIN SODIUM IN 0.9 % NACL 3 G/100 ML
3 INTRAVENOUS SOLUTION, PIGGYBACK (ML) INTRAVENOUS ONCE
Status: COMPLETED | OUTPATIENT
Start: 2023-11-06 | End: 2023-11-06

## 2023-11-06 RX ORDER — ACETAMINOPHEN 500 MG
1000 TABLET ORAL ONCE
Status: DISCONTINUED | OUTPATIENT
Start: 2023-11-06 | End: 2023-11-06 | Stop reason: HOSPADM

## 2023-11-06 RX ORDER — ONDANSETRON 2 MG/ML
INJECTION INTRAMUSCULAR; INTRAVENOUS AS NEEDED
Status: DISCONTINUED | OUTPATIENT
Start: 2023-11-06 | End: 2023-11-06 | Stop reason: SURG

## 2023-11-06 RX ORDER — SODIUM CHLORIDE, SODIUM LACTATE, POTASSIUM CHLORIDE, CALCIUM CHLORIDE 600; 310; 30; 20 MG/100ML; MG/100ML; MG/100ML; MG/100ML
INJECTION, SOLUTION INTRAVENOUS CONTINUOUS
Status: DISCONTINUED | OUTPATIENT
Start: 2023-11-06 | End: 2023-11-06

## 2023-11-06 RX ORDER — NICOTINE POLACRILEX 4 MG
15 LOZENGE BUCCAL
Status: DISCONTINUED | OUTPATIENT
Start: 2023-11-06 | End: 2023-11-06 | Stop reason: HOSPADM

## 2023-11-06 RX ORDER — GLYCOPYRROLATE 0.2 MG/ML
INJECTION, SOLUTION INTRAMUSCULAR; INTRAVENOUS AS NEEDED
Status: DISCONTINUED | OUTPATIENT
Start: 2023-11-06 | End: 2023-11-06 | Stop reason: SURG

## 2023-11-06 RX ORDER — HYDROMORPHONE HYDROCHLORIDE 1 MG/ML
0.4 INJECTION, SOLUTION INTRAMUSCULAR; INTRAVENOUS; SUBCUTANEOUS EVERY 5 MIN PRN
Status: DISCONTINUED | OUTPATIENT
Start: 2023-11-06 | End: 2023-11-06

## 2023-11-06 RX ORDER — MIDAZOLAM HYDROCHLORIDE 1 MG/ML
1 INJECTION INTRAMUSCULAR; INTRAVENOUS EVERY 5 MIN PRN
Status: DISCONTINUED | OUTPATIENT
Start: 2023-11-06 | End: 2023-11-06

## 2023-11-06 RX ORDER — HYDROMORPHONE HYDROCHLORIDE 1 MG/ML
INJECTION, SOLUTION INTRAMUSCULAR; INTRAVENOUS; SUBCUTANEOUS
Status: COMPLETED
Start: 2023-11-06 | End: 2023-11-06

## 2023-11-06 RX ORDER — LIDOCAINE HYDROCHLORIDE 10 MG/ML
INJECTION, SOLUTION EPIDURAL; INFILTRATION; INTRACAUDAL; PERINEURAL AS NEEDED
Status: DISCONTINUED | OUTPATIENT
Start: 2023-11-06 | End: 2023-11-06 | Stop reason: SURG

## 2023-11-06 RX ORDER — NICOTINE POLACRILEX 4 MG
30 LOZENGE BUCCAL
Status: DISCONTINUED | OUTPATIENT
Start: 2023-11-06 | End: 2023-11-06 | Stop reason: HOSPADM

## 2023-11-06 RX ORDER — HYDROCODONE BITARTRATE AND ACETAMINOPHEN 5; 325 MG/1; MG/1
1 TABLET ORAL ONCE AS NEEDED
Status: DISCONTINUED | OUTPATIENT
Start: 2023-11-06 | End: 2023-11-06

## 2023-11-06 RX ORDER — LABETALOL HYDROCHLORIDE 5 MG/ML
5 INJECTION, SOLUTION INTRAVENOUS EVERY 5 MIN PRN
Status: DISCONTINUED | OUTPATIENT
Start: 2023-11-06 | End: 2023-11-06

## 2023-11-06 RX ORDER — HYDROMORPHONE HYDROCHLORIDE 1 MG/ML
0.2 INJECTION, SOLUTION INTRAMUSCULAR; INTRAVENOUS; SUBCUTANEOUS EVERY 5 MIN PRN
Status: DISCONTINUED | OUTPATIENT
Start: 2023-11-06 | End: 2023-11-06

## 2023-11-06 RX ORDER — BUPIVACAINE HYDROCHLORIDE AND EPINEPHRINE 2.5; 5 MG/ML; UG/ML
INJECTION, SOLUTION EPIDURAL; INFILTRATION; INTRACAUDAL; PERINEURAL AS NEEDED
Status: DISCONTINUED | OUTPATIENT
Start: 2023-11-06 | End: 2023-11-06 | Stop reason: HOSPADM

## 2023-11-06 RX ORDER — ACETAMINOPHEN 500 MG
1000 TABLET ORAL ONCE AS NEEDED
Status: DISCONTINUED | OUTPATIENT
Start: 2023-11-06 | End: 2023-11-06

## 2023-11-06 RX ORDER — HYDROMORPHONE HYDROCHLORIDE 1 MG/ML
0.6 INJECTION, SOLUTION INTRAMUSCULAR; INTRAVENOUS; SUBCUTANEOUS EVERY 5 MIN PRN
Status: DISCONTINUED | OUTPATIENT
Start: 2023-11-06 | End: 2023-11-06

## 2023-11-06 RX ORDER — ROCURONIUM BROMIDE 10 MG/ML
INJECTION, SOLUTION INTRAVENOUS AS NEEDED
Status: DISCONTINUED | OUTPATIENT
Start: 2023-11-06 | End: 2023-11-06 | Stop reason: SURG

## 2023-11-06 RX ORDER — INSULIN ASPART 100 [IU]/ML
INJECTION, SOLUTION INTRAVENOUS; SUBCUTANEOUS ONCE
Status: COMPLETED | OUTPATIENT
Start: 2023-11-06 | End: 2023-11-06

## 2023-11-06 RX ORDER — HYDROCODONE BITARTRATE AND ACETAMINOPHEN 5; 325 MG/1; MG/1
2 TABLET ORAL ONCE AS NEEDED
Status: DISCONTINUED | OUTPATIENT
Start: 2023-11-06 | End: 2023-11-06

## 2023-11-06 RX ORDER — NALOXONE HYDROCHLORIDE 0.4 MG/ML
0.08 INJECTION, SOLUTION INTRAMUSCULAR; INTRAVENOUS; SUBCUTANEOUS AS NEEDED
Status: DISCONTINUED | OUTPATIENT
Start: 2023-11-06 | End: 2023-11-06

## 2023-11-06 RX ORDER — CYCLOBENZAPRINE HYDROCHLORIDE 7.5 MG/1
7.5 TABLET, FILM COATED ORAL 3 TIMES DAILY PRN
Qty: 40 TABLET | Refills: 0 | Status: SHIPPED | OUTPATIENT
Start: 2023-11-06

## 2023-11-06 RX ORDER — ONDANSETRON 2 MG/ML
4 INJECTION INTRAMUSCULAR; INTRAVENOUS EVERY 6 HOURS PRN
Status: DISCONTINUED | OUTPATIENT
Start: 2023-11-06 | End: 2023-11-06

## 2023-11-06 RX ORDER — CYCLOBENZAPRINE HYDROCHLORIDE 7.5 MG/1
7.5 TABLET, FILM COATED ORAL 3 TIMES DAILY PRN
Status: ON HOLD | COMMUNITY
End: 2023-11-06

## 2023-11-06 RX ORDER — NEOSTIGMINE METHYLSULFATE 1 MG/ML
INJECTION, SOLUTION INTRAVENOUS AS NEEDED
Status: DISCONTINUED | OUTPATIENT
Start: 2023-11-06 | End: 2023-11-06 | Stop reason: SURG

## 2023-11-06 RX ORDER — MEPERIDINE HYDROCHLORIDE 25 MG/ML
12.5 INJECTION INTRAMUSCULAR; INTRAVENOUS; SUBCUTANEOUS AS NEEDED
Status: DISCONTINUED | OUTPATIENT
Start: 2023-11-06 | End: 2023-11-06

## 2023-11-06 RX ORDER — DEXTROSE MONOHYDRATE 25 G/50ML
50 INJECTION, SOLUTION INTRAVENOUS
Status: DISCONTINUED | OUTPATIENT
Start: 2023-11-06 | End: 2023-11-06 | Stop reason: HOSPADM

## 2023-11-06 RX ORDER — HYDROCODONE BITARTRATE AND ACETAMINOPHEN 5; 325 MG/1; MG/1
1-2 TABLET ORAL EVERY 4 HOURS PRN
Qty: 60 TABLET | Refills: 0 | Status: SHIPPED | OUTPATIENT
Start: 2023-11-06

## 2023-11-06 RX ORDER — DEXAMETHASONE SODIUM PHOSPHATE 4 MG/ML
VIAL (ML) INJECTION AS NEEDED
Status: DISCONTINUED | OUTPATIENT
Start: 2023-11-06 | End: 2023-11-06 | Stop reason: SURG

## 2023-11-06 RX ADMIN — NEOSTIGMINE METHYLSULFATE 5 MG: 1 INJECTION, SOLUTION INTRAVENOUS at 09:57:00

## 2023-11-06 RX ADMIN — CEFAZOLIN SODIUM IN 0.9 % NACL 3 G: 3 G/100 ML INTRAVENOUS SOLUTION, PIGGYBACK (ML) INTRAVENOUS at 07:43:00

## 2023-11-06 RX ADMIN — ROCURONIUM BROMIDE 50 MG: 10 INJECTION, SOLUTION INTRAVENOUS at 07:45:00

## 2023-11-06 RX ADMIN — ROCURONIUM BROMIDE 10 MG: 10 INJECTION, SOLUTION INTRAVENOUS at 08:46:00

## 2023-11-06 RX ADMIN — SODIUM CHLORIDE, SODIUM LACTATE, POTASSIUM CHLORIDE, CALCIUM CHLORIDE: 600; 310; 30; 20 INJECTION, SOLUTION INTRAVENOUS at 09:48:00

## 2023-11-06 RX ADMIN — LIDOCAINE HYDROCHLORIDE 50 MG: 10 INJECTION, SOLUTION EPIDURAL; INFILTRATION; INTRACAUDAL; PERINEURAL at 07:38:00

## 2023-11-06 RX ADMIN — DEXAMETHASONE SODIUM PHOSPHATE 8 MG: 4 MG/ML VIAL (ML) INJECTION at 07:52:00

## 2023-11-06 RX ADMIN — SODIUM CHLORIDE, SODIUM LACTATE, POTASSIUM CHLORIDE, CALCIUM CHLORIDE: 600; 310; 30; 20 INJECTION, SOLUTION INTRAVENOUS at 07:34:00

## 2023-11-06 RX ADMIN — ONDANSETRON 4 MG: 2 INJECTION INTRAMUSCULAR; INTRAVENOUS at 09:38:00

## 2023-11-06 RX ADMIN — GLYCOPYRROLATE 0.8 MG: 0.2 INJECTION, SOLUTION INTRAMUSCULAR; INTRAVENOUS at 09:57:00

## 2023-11-06 NOTE — OPERATIVE REPORT
Operative Note    Patient Name: Geri Moe. Preoperative Diagnosis: Lumbar radiculopathy, acute [M54.16]  S/P lumbar microdiscectomy [Z98.890]  Lumbar stenosis with neurogenic claudication [M48.062]    Postoperative Diagnosis: same    Primary Surgeon: Ami Salgado MD    Assistant: CARON Duff who was essential the case including opening, closing and retraction     Procedures: Lumbar 4 and lumbar 5 laminectomies with medial facetectomies    Surgical Findings: Severe stenosis at L4-5 with fair amount of scarring bilaterally    Anesthesia: General    Complications: none    Implants: None    Specimen: None    Drains: None    Condition: Stable    Estimated Blood Loss: 25 mL    Indication for Procedure: 47-year-old gentleman with back and leg pain. Patient has been treated conservatively. Patient got EMG as well as further imaging. Patient was seen in clinic. After several discussions patient was planned for surgical decompression. Procedure: Patient prepped identified brought back to the OR 15. Patient placed under general anesthesia by anesthesia staff. Patient placed on the OR table, position and all pressure points padded. Fluoroscopy shot to confirm the levels. Patient was sterilely prepped and draped in usual fashion. 10 mils of quarter percent Marcaine with epinephrine was given as local anesthetic. Incision made dissection down to fascia. A subperiosteal dissection was performed at L4-5 bilaterally. We did note a fair amount of scarring on the left. The deep retractors were placed. Fluoroscopy confirmed the level. I then remove the spinous process of L4 and L5 with rongeurs. The inferior lamina of L5 was delayed with a curette. We then used Kerrisons and curettes perform a laminectomy. Of note he is severely scarred bilaterally at L4-5. We then did our lateral decompressions at L4-5 due to medial facetectomy. Once was done the dura was nicely decompressed. Hemostasis was achieved. Fluoroscopy confirmed above and below the compression sites. Again hemostasis was completed. The wound was then closed in layers. The patient was taken off the OR table. Patient taken by anesthesia and taken recovery.     Dictated but not proofread

## 2023-11-06 NOTE — ANESTHESIA POSTPROCEDURE EVALUATION
[facility] Patient Status:  Outpatient in a Bed   Age/Gender 70year old male MRN [id number]   [facility] SURGERY Attending Emperatriz Looney MD   [facility] Day # 0 [facility] Wiley Smith MD       Anesthesia Post-op Note    REDO LUMBAR 4, LUMBAR 5 LAMINECTOMIES AND ALL INDICATED PROCEDURES    Procedure Summary       Date: 11/06/23 Room / Location: [facility] MAIN OR  / [facility] MAIN OR    Anesthesia Start: 0405 Anesthesia Stop: 2651    Procedure: REDO LUMBAR 4, LUMBAR 5 LAMINECTOMIES AND ALL INDICATED PROCEDURES (Spine Lumbar) Diagnosis:       Lumbar radiculopathy, acute      S/P lumbar microdiscectomy      Lumbar stenosis with neurogenic claudication      (Lumbar radiculopathy, acute [M54.16]S/P lumbar microdiscectomy [Z98.890]Lumbar stenosis with neurogenic claudication [I95.314])    Surgeons: Emperatriz Looney MD Anesthesiologist: Julio Mruphy MD    Anesthesia Type: general ASA Status: 2            Anesthesia Type: general    Vitals Value Taken Time   /74 11/06/23 1022   Temp 97.3 11/06/23 1022   Pulse 68 11/06/23 1022   Resp 14 11/06/23 1022   SpO2 97 11/06/23 1022       Patient Location: PACU    Anesthesia Type: general    Airway Patency: patent and extubated    Postop Pain Control: adequate    Mental Status: preanesthetic baseline    Nausea/Vomiting: none    Cardiopulmonary/Hydration status: stable euvolemic    Complications: no apparent anesthesia related complications    Postop vital signs: stable    Dental Exam: Unchanged from Preop    Patient to be discharged from PACU when criteria met.

## 2023-11-06 NOTE — ANESTHESIA PROCEDURE NOTES
Airway  Date/Time: 11/6/2023 7:39 AM  Urgency: elective    Airway not difficult    General Information and Staff    Patient location during procedure: OR  Anesthesiologist: Maddie Stovall MD  Resident/CRNA: Antonio Carrera CRNA  Performed: CRNA   Performed by: Antonio Carrera CRNA  Authorized by: Maddie Stovall MD      Indications and Patient Condition  Indications for airway management: anesthesia  Spontaneous Ventilation: absent  Sedation level: deep  Preoxygenated: yes  Patient position: sniffing  Mask difficulty assessment: 0 - not attempted    Final Airway Details  Final airway type: endotracheal airway      Successful airway: ETT  Cuffed: yes   Successful intubation technique: Video laryngoscopy  Facilitating devices/methods: intubating stylet and rapid sequence intubation  Endotracheal tube insertion site: oral  Blade: GlideScope  Blade size: #4  ETT size (mm): 7.5    Cormack-Lehane Classification: grade I - full view of glottis  Placement verified by: capnometry   Cuff volume (mL): 7  Measured from: lips  ETT to lips (cm): 23  Number of attempts at approach: 1  Number of other approaches attempted: 0    Additional Comments  Dentition per pre op

## 2023-11-06 NOTE — BRIEF OP NOTE
Pre-Operative Diagnosis: Lumbar radiculopathy, acute [M54.16]  S/P lumbar microdiscectomy [Z98.890]  Lumbar stenosis with neurogenic claudication [M48.062]     Post-Operative Diagnosis: Lumbar radiculopathy, acute [M54.16]S/P lumbar microdiscectomy [Z98.890]Lumbar stenosis with neurogenic claudication [M48.062]      Procedure Performed:   REDO LUMBAR 4, LUMBAR 5 LAMINECTOMIES AND ALL INDICATED PROCEDURES    Surgeon(s) and Role:     * Robby Lozano MD - Primary    Assistant(s):  APN: CARON Matamoros     Surgical Findings: Severe stenosis at L4-5 with a fair amount of scarring to the right     Specimen: None     Estimated Blood Loss: Blood Output: 25 mL (11/6/2023  9:37 AM)          Cesilia Joya MD  11/6/2023  10:20 AM

## 2023-11-06 NOTE — INTERVAL H&P NOTE
Pre-op Diagnosis: Lumbar radiculopathy, acute [M54.16]  S/P lumbar microdiscectomy [Z98.890]  Lumbar stenosis with neurogenic claudication [M48.062]    The above referenced H&P was reviewed by CARON Chapa on 11/6/2023, the patient was examined and no significant changes have occurred in the patient's condition since the H&P was performed. I discussed with the patient and/or legal representative the potential benefits, risks and side effects of this procedure; the likelihood of the patient achieving goals; and potential problems that might occur during recuperation. I discussed reasonable alternatives to the procedure, including risks, benefits and side effects related to the alternatives and risks related to not receiving this procedure. We will proceed with procedure as planned.

## 2023-11-10 ENCOUNTER — TELEPHONE (OUTPATIENT)
Dept: SURGERY | Facility: CLINIC | Age: 63
End: 2023-11-10

## 2023-11-10 ENCOUNTER — PATIENT MESSAGE (OUTPATIENT)
Dept: FAMILY MEDICINE CLINIC | Facility: CLINIC | Age: 63
End: 2023-11-10

## 2023-11-10 DIAGNOSIS — E11.40 TYPE 2 DIABETES MELLITUS WITH DIABETIC NEUROPATHY, WITHOUT LONG-TERM CURRENT USE OF INSULIN (HCC): ICD-10-CM

## 2023-11-10 DIAGNOSIS — E11.42 DIABETIC POLYNEUROPATHY ASSOCIATED WITH TYPE 2 DIABETES MELLITUS (HCC): ICD-10-CM

## 2023-11-10 NOTE — TELEPHONE ENCOUNTER
For Lumbar Sx:    Patient had REDO LUMBAR 4, LUMBAR 5 LAMINECTOMIES AND ALL INDICATED PROCEDURES  on 11/6/2023 by Dr. Elie Haskins op day 4    1)Asked how Angelito Rodas is feeling in general she/he stated:  Pt states he is feeling much better. Tightness and pain at the in    2) Discussed Dressing with patient, informed them dressing can be removed on day 3,  Informed the pt how to appropriately clean and care for incision site. To wash with Luke warm soapy water and rinse incision site and pat dry with a clean towel. Pt acknowledged. 3)Site check for redness, drainage, swelling, discoloration, fever, etc.  Patient states: pt denies any signs of infection at this time. 4) Discussed Current Pain Level:  Pain currently:   - Incision site 2-3/10 depending on activity    - Thigh muscles are tight all the time and at a 3-4/10    If patient is struggling with pain control, discuss recommendations to help with pain  Discussed NSAIDS and Heat and Ice Therapy, advised the pt to use at 15 min intervals and to have a barrier between the therapy and the skin pt acknowledged     5) Discussed Symptom improvement with patient they stated:  Left  thigh pain has not changed however was more  pain free than he was before. 6) Discussed medication and asked if he/she currently needs any refills, Pt stated:   Pt denies refill and stopped taking opioids a day or two ago. 7) Confirm post-op appointments with patient:    2 week: 11/21/2023 at  0930 with KAMLESH Spencer   6/8 week: 12/19/2023 at 3p with Dr. Edouard Soto     8) Discussed Surgical Restrictions with patient:  No baths/pool/hot tub  No bending, lifting, twisting at least until 2 week follow up  Pt acknowledged. 9)Verified if pt had any other issues they needed to discuss:  Pt denies at this time. 10)Provided update on FMLA (received, initiated, need signature, completed etc)  Pt denies any documents needed at this time.        Routed to provider for update

## 2023-11-10 NOTE — TELEPHONE ENCOUNTER
Received pt reminder notice       Per pt reminder note:    \"Pt scheduled for surgery on 11.6. 21 with Dr. Laureano Whitney     Pt reminder placed to appear 3-5 days after sx \"    Routed to  RN pool    REDO LUMBAR 4, LUMBAR 5 LAMINECTOMIES AND ALL INDICATED PROCEDURES

## 2023-11-13 NOTE — TELEPHONE ENCOUNTER
From: Alex Laura. To: Stefanie Glaicia  Sent: 11/10/2023 4:16 PM CST  Subject: Metformin refill    I am unable to get this script refilled either through my chart or my preferred pharmacy. Can you help me out with this. I will be out of pills in about 10 days.  Thank you,

## 2023-11-21 ENCOUNTER — OFFICE VISIT (OUTPATIENT)
Dept: SURGERY | Facility: CLINIC | Age: 63
End: 2023-11-21
Payer: COMMERCIAL

## 2023-11-21 VITALS
HEIGHT: 76 IN | BODY MASS INDEX: 31.9 KG/M2 | SYSTOLIC BLOOD PRESSURE: 132 MMHG | WEIGHT: 262 LBS | HEART RATE: 90 BPM | DIASTOLIC BLOOD PRESSURE: 60 MMHG

## 2023-11-21 DIAGNOSIS — M54.16 LUMBAR RADICULOPATHY, ACUTE: ICD-10-CM

## 2023-11-21 DIAGNOSIS — Z98.890 S/P LUMBAR LAMINECTOMY: Primary | ICD-10-CM

## 2023-11-21 PROCEDURE — 3075F SYST BP GE 130 - 139MM HG: CPT | Performed by: NURSE PRACTITIONER

## 2023-11-21 PROCEDURE — 1111F DSCHRG MED/CURRENT MED MERGE: CPT | Performed by: NURSE PRACTITIONER

## 2023-11-21 PROCEDURE — 99024 POSTOP FOLLOW-UP VISIT: CPT | Performed by: NURSE PRACTITIONER

## 2023-11-21 PROCEDURE — 3078F DIAST BP <80 MM HG: CPT | Performed by: NURSE PRACTITIONER

## 2023-11-21 PROCEDURE — 3008F BODY MASS INDEX DOCD: CPT | Performed by: NURSE PRACTITIONER

## 2023-11-21 NOTE — PROGRESS NOTES
[facility]   Outpatient Neurological Surgery Follow Up    [facility]. : 2023  PCP: Darin Kumari. Vinod Negron MD  Referring Provider: No ref. provider found    REASON FOR VISIT:  Chief Complaint   Patient presents with    Post-Op     2 weeks   REDO LUMBAR 4, LUMBAR 5 LAMINECTOMIES AND ALL INDICATED PROCEDURES       HISTORY OF PRESENT ILLNESS:  Sandra Roberts is a 70year old male. ***    PAST MEDICAL HISTORY:  Past Medical History:   Diagnosis Date    Back problem     Belching     Occasionally    Bloating     Occasionally    Constipation     Occasionally    Diabetic peripheral neuropathy (HCC)     Esophageal reflux     Flatulence/gas pain/belching     Occasionally    Frequent use of laxatives     Occasionally    Hearing loss 2019    Age related    Heartburn     Occasionally    High blood pressure     High cholesterol     Indigestion     Occasionally    Intractable vomiting with nausea, unspecified vomiting type 2021    Neuropathy     MID-SHIN AND DOWN    Osteoarthritis     GENERALIZED    Sleep apnea     CPAP    Type II or unspecified type diabetes mellitus without mention of complication, not stated as uncontrolled     Unspecified essential hypertension     Visual impairment     Wears glasses 1990    Readers    Weight gain 2020    Lock down       PAST SURGICAL HISTORY:  Past Surgical History:   Procedure Laterality Date    APPENDECTOMY      COLONOSCOPY      COLONOSCOPY      OTHER      Right big toe partially amputated    OTHER SURGICAL HISTORY  2023    REDO LUMBAR 4, LUMBAR 5 LAMINECTOMIES    SIGMOIDOSCOPY,DIAGNOSTIC      SPINE SURGERY PROCEDURE UNLISTED          VASECTOMY  18 years ago       SOCIAL HISTORY:   reports that he quit smoking about 4 years ago. His smoking use included cigarettes. He has a 19.00 pack-year smoking history.  He has never used smokeless tobacco. He reports current alcohol use of about 1.0 - 2.0 standard drink of alcohol per week. He reports that he does not use drugs. ALLERGIES:  No Known Allergies    MEDICATIONS:  Current Outpatient Medications   Medication Sig Dispense Refill    metFORMIN HCl 1000 MG Oral Tab Take 1 tablet (1,000 mg total) by mouth 2 (two) times daily with meals. 180 tablet 0    Cyclobenzaprine HCl 7.5 MG Oral Tab Take 1 tablet (7.5 mg total) by mouth 3 (three) times daily as needed for Muscle spasms. 40 tablet 0    HYDROcodone-acetaminophen 5-325 MG Oral Tab Take 1-2 tablets by mouth every 4 (four) hours as needed for Pain. 60 tablet 0    losartan 100 MG Oral Tab Take 1 tablet (100 mg total) by mouth daily. 90 tablet 1    atorvastatin 10 MG Oral Tab Take 1 tablet (10 mg total) by mouth nightly. 90 tablet 1    Empagliflozin (JARDIANCE) 25 MG Oral Tab Take 1 tablet by mouth daily. 90 tablet 1    gabapentin 300 MG Oral Cap Take 1 capsule (300 mg total) by mouth in the morning and 1 capsule (300 mg total) at noon and 1 capsule (300 mg total) in the evening. 270 capsule 1    Liraglutide (VICTOZA) 18 MG/3ML Subcutaneous Solution Pen-injector Inject 1.8 mg under the skin daily 27 mL 1    orphenadrine  MG Oral Tablet 12 Hr Take 100 mg by mouth 2 (two) times daily. 120 tablet 0    BD PEN NEEDLE KATY 2ND GEN 32G X 4 MM Does not apply Misc USE 1 DAILY 100 each 3    Glucose Blood (CONTOUR NEXT TEST) In Vitro Strip Pt testing bid Pt uses contour next meter 200 each 3    Alcohol Swabs (ALCOHOL PREP) Does not apply Pads Use daily with victoza 90 each 3    FAMOTIDINE OR Take 1 tablet by mouth daily as needed. REVIEW OF SYSTEMS:  Musculoskeletal: ***  Bowel and Bladder function: ***  Pertinent positives and negatives are noted in HPI. PHYSICAL EXAMINATION:  VITAL SIGNS: There were no vitals taken for this visit. GENERAL:  Patient is a 70year old male ***. HEENT:  Normocephalic, atraumatic. NEUROLOGICAL:  This patient is alert and orientated x 3. Speech fluent.  Able to follow simple commands. CN II - XII intact. Pupils ERRL. EOMs intact. Face symmetric. Uvula and palate midline. Shrug shoulders intact. Tongue midline. Finger-to-nose coordination normal.  Sensory: intact. Motor: 5/5. Gait steady      IMAGING:  ***    ASSESSMENT:  No diagnosis found. PLAN:  1.  2.  3.     Dr. Gema Burk evaluated the patient and is in agreement with the plan. Total time spent:   Greater than 50% of the time was spent on counseling/coordination of care.   Vivian Bishop of education / counseling: Pathology, treatment options, and expected outcomes    CARON Knott  11/21/2023, 8:50 AM

## 2023-11-21 NOTE — PROGRESS NOTES
Established patient:  Reason for follow up 2 week post op   REDO LUMBAR 4, LUMBAR 5 LAMINECTOMIES AND ALL INDICATED PROCEDURES     Numeric Rating Scale: (No Pain) 0  to  10 (Worst Pain)        Pain at Present:  5/10       Distribution of Pain:        Most recent treatments for Current Pain Condition:   Surgery  Response to treatment: no relief    New imaging or testing since your last office visit:

## 2023-12-05 ENCOUNTER — OFFICE VISIT (OUTPATIENT)
Dept: PHYSICAL THERAPY | Age: 63
End: 2023-12-05
Attending: NURSE PRACTITIONER
Payer: COMMERCIAL

## 2023-12-05 ENCOUNTER — TELEPHONE (OUTPATIENT)
Dept: PHYSICAL THERAPY | Facility: HOSPITAL | Age: 63
End: 2023-12-05

## 2023-12-05 DIAGNOSIS — Z98.890 S/P LUMBAR LAMINECTOMY: Primary | ICD-10-CM

## 2023-12-05 DIAGNOSIS — M54.16 LUMBAR RADICULOPATHY, ACUTE: ICD-10-CM

## 2023-12-05 PROCEDURE — 97140 MANUAL THERAPY 1/> REGIONS: CPT

## 2023-12-05 PROCEDURE — 97161 PT EVAL LOW COMPLEX 20 MIN: CPT

## 2023-12-05 PROCEDURE — 97110 THERAPEUTIC EXERCISES: CPT

## 2023-12-05 NOTE — PROGRESS NOTES
PHYSICAL THERAPY INITIAL EVALUATION     Date of service: 12/5/2023  Dx: S/P lumbar laminectomy (M24.554), Lumbar radiculopathy, acute (M54.16)       Insurance: N/A  Insurance Limits: Tammy Billy 150 O  Visit #: 1  Authorized # of Visits: 12  POC/Auth Expiration: 12/31/23  Authorizing Physician/Provider: Deb Bird MD    PATIENT SUMMARY     History/DAJUAN: The patient returns for post-operative physical therapy s/p L4-L5 lumbar laminectomy. The patient reports that in addition to the laminectomy revision, they also removed scar tissue and debrided the disc. The patient repots that the previous pain across his hips is gone, but still has some pain across the back, which he believes is due to loss of muscle strength. \"My left thigh is maybe even worse than before the surgery. They don't think the left leg is related to my back. \"It's not tight as it used to be, but it's achy all the time. It feels more life weakness. \"    DOI/S: 11/6/23    Aggravating Factors: going up and down stairs (left thigh), walking, standing     PMH: The patient's PMH was reviewed with the patient including allergies, medications, and surgical and medical history. Patient  has a past medical history of Back problem, Belching, Bloating, Constipation, Diabetic peripheral neuropathy ([address] Esophageal reflux, Flatulence/gas pain/belching, Frequent use of laxatives, Hearing loss (01/01/2019), Heartburn, High blood pressure, High cholesterol, Indigestion, Intractable vomiting with nausea, unspecified vomiting type (04/07/2021), Neuropathy, Osteoarthritis, Sleep apnea, Type II or unspecified type diabetes mellitus without mention of complication, not stated as uncontrolled, Unspecified essential hypertension, Visual impairment, Wears glasses (01/01/1990), and Weight gain (04/2020). PLF/Personal Goals: \"I'd like to be more active. I think I can be now. I want to be able to walk and do things normally again. \"     OBJECTIVE:     Pain/Symptom Presentation: Patient reports some tightness across his lower back. Pain at rest: 0/10  Pain at worst: 3/10    Outcomes Measure(s):   Modified Oswestry: 22% disability    Activity Measures:  Sitting: Mild Activity Limitation: Patient is able to sit longer with better posture. Bending Forward: Mild Activity Limitation: Patient is able to bend forward without limitations. Standing/Walking After Prolonged Sitting: Mild Activity Limitation: Patient's stiffness subsides within a few steps. Walking: Moderate Activity Limitation: Patient is able to walk up to 0.5 miles. Driving: No Activity Limitation: Patient is able to drive without limitations. Pushing: Moderate Activity Limitation: Patient is under 25lbs lifting restrictions per MD.   Pulling: Moderate Activity Limitation: Patient is under 25lbs lifting restrictions per MD.   Lifting Light Objects: Mild Activity Limitation: Patient is under 25lbs lifting restrictions per MD.   Lifting Heavy Objects: Moderate Activity Limitation: Patient is under 25lb lifting restrictions per MD.     Inspection/Observation: Patient demonstrates limited segmental lumbar mobility with forward bending. Patient demonstrates sitting postural dysfunction with forward head posture, excessive neck protraction and thoracic kyphosis, anterior tilt and downward rotation of the scapula, internal rotation of shoulder, decreased scapular and abdominal tone, and reduced lumbar lordosis. Patient's incision is fully approximated and appears to be healing well without abnormal drainage. Palpation: Patient demonstrates thoracic spine accessory mobility deficits. Sensation: Patient reports tightness over his left thigh. Is with bilateral numbness of his feet due to neuropathy.     ROM:   Hip Motion PROM AROM    Right Left Right Left   Hip Flexion N/A N/A [facility]   Hip Extension N/A N/A N/A N/A   Hip ER (at 90deg hip flex) 50 50 N/A N/A   Hip IR (at 90deg hip flex) 30 30 N/A N/A   *indicates activity was associated with pain    Strength/MMT:  Hip Motion Strength    Right Left   Hip Flexion 4+/5 4-/5   Hip Extension 4/5 4/5   Hip ABD 4/5 4/5   Hip ER 4/5 4/5   Hip IR  4/5 4/5   *indicates activity was associated with pain     Knee Motion Strength    Right Left   Knee Extension 4/5 4-/5   Knee Flexion 4/5 4/5   *indicates activity was associated with pain     Special Tests:   Low Back Special Tests: Forward spinal flexion: (-)  Spinal extension: (-)  Slump test:(R) (-), (L) (-)  SLR: (R) (-), (L) (-)  Hip FADIR (hip scour): (R) (-), (L) (-)     SFMA Base Screen:   Multi-Segmental Flexion: DN  Multi-Segmental Extension: DN  Multi-Segmental Rotation: (R) DN, (L) DN     Multi-Segmental Flexion SFMA Breakout  Active SLR (70deg): (R) DN, (L) DN  Passive SLR (80deg): (R) DN, (L) DN  Supine Knee to Chest: DN    Multi-Segmental Rotation SFMA Breakout  Active Seated Hip ER (40deg): (R) FN, (L) FN  Passive Seated Hip ER (40deg): (R) FN, (L) FN  Active Seated Hip IR (30deg): (R) FN, (L) FN  Passive Seated Hip IR (30deg): (R) FN, (L) FN    ASSESSMENT:     Lola Siegel is a 70year old male that presents to physical therapy evaluation s/p L4-L5 laminectomy and microdisectomy 11/6/23. The patient is currently under a 25lb lifting restriction per MD instructions. Since his surgery, the patient reports improved sensation and functional use of his right leg. However, he is still with complaints of tightness and weakness in his left leg, etiology still unclear at this time. The patient demonstrates strength deficits in left quad and hip flexor compared to the right side. The patient demonstrates postural mobility, endurance, and strength deficits. The patient is very motivated and is hoping to progress his strength and endurance for long-term health and fitness. Current functional limitation include but are not limited to lifting, pushing, pulling, walking long distances, and navigating stairs.  The patient would benefit from physical therapy to facilitate improved postural mobility, strength, and endurance as well as general LE strength and stability for improved tolerance to ADL's. Precautions/WB Status: WBAT  Education or Treatment Limitation(s): None  Rehab Potential: Good    TREATMENT:     Initial Evaluation: x 20min     Therapeutic Exercise: x 13min  Manual hamstring stretch: x 30\" (B)   Hamstring stretch with strap: x 30\" (B)   DL bridging: 2 x 10   S/L clamshell: 2 x 10 (B), green theraband   Modified downward dog: 1 x 10 x 3\"   Patient Education: Patient was educated on anatomy and pathophysiology of current condition, rationale for physical therapy, anticipated treatment interventions, prognosis, timeline for recovery, and expected functional outcomes based on evaluative findings. Patient was educated on the importance of compliance with consistent treatment and HEP to achieve mutually established goals. Administered HEP: Issued and reviewed HEP handout, exercise selection, and recommended resistance. Provided verbal and written instructions/cueing for proper technique and common errors/compensations as needed. Neuromuscular Re-education: x 2min  Hooklying PPT: 2 x 10 x 3\"     Manual Therapy: x 10min  Lumbar PA accessory joint mobs - L1-L5: Grade 2, 4 x 10\" each   Thoracic PA accessory joint mobs - T1-T12: Grade 3, 2 x 10\" each     Home Exercise Program: Patient was issued a HEP handout 12/5/2023 including hamstring stretch with strap, DL bridging, S/L clamshell, hooklying PPT, and modified downward dog. Provider Interactions With Patient:   Patient education was provided as described above. All patient's questions were answered and the patient denied further comments, complaints, or concerns upon departure. Patient was issued an appt list and verbally confirmed the next appt date and time to ensure consistency with physical therapy attendance.     Charges: PT Eval Low Complexity Complexity x 1, MT x 1, Therex x 1  Total Timed Treatment: 25min       Total Treatment Time: 45min     PLAN OF CARE:      Goals:  Short-Term Goals:  Patient will improve low back mobility and postural endurance to maintain proper posture with neutral lumbar spine and pelvic position while sitting to facilitate 4 hours of pain-free sitting. Timeframe: 3 weeks. Long-Term Goals:  Patient will improve low back pain and core endurance to facilitate walking distances of 2 miles daily to facilitate household and community ambulation. Timeframe: 6 weeks. Patient will improve LE mobility, strength, and single-leg stabilization to meet strength requirements for reciprocal stair navigation pattern with no asymmetries for ascending and descending 3 flights of stairs daily for community integration. Timeframe: 6-8 weeks. Patient will demonstrates safe and proper lifting mechanics with intermittent lifting objects of 35lbs from a lower height to facilitate housework and lifting ADL's. Timeframe: 6-8 weeks. Patient will improve Modified Oswestry Low Back Pain Disability Questionnaire score by 10 points or 10% to indicate a true change in improved function for ADL's and restoring PLF. Timeframe: 6-8 weeks. Plan Frequency / Duration: Patient will be seen for 2x/week for 6 weeks, for a total of 12 visits, over a 90 day period. We will re-evaluate the patient at that time in order to determine functional progress, evaluate short-term goal completion, and establish an updated plan of care. Possible treatment interventions will/may include: Therapeutic Activities, Therapeutic Exercise, Neuromuscular Re-education, Manual Therapy, Home Exercise Program Instruction, Patient Education, Self-Care/Home Management, and Modalities as needed. Patient/Family was advised of these findings, precautions, and treatment options and has agreed to actively participate in planning and for this course of care.     Thank you for your referral. Please co-sign or sign and return this letter via fax as soon as possible to [phone]. If you have any questions, please contact me at Dept: [phone]. Sincerely,    X___Shana Lambert, PT, DPT, SCS, ATC, CSCS____ Date: _____12/5/2023________    Electronically signed by therapist: Taryn Darling PT  [de-identified] certification required: Yes  I certify the need for these services furnished under this plan of treatment and while under my care.

## 2023-12-05 NOTE — PATIENT INSTRUCTIONS
Thank you for coming to your physical therapy appt! During your appt today you were issued a HEP handout from [employer] which can also be accessed using the information below. The exercises chosen are meant to supplement the treatment you received and reinforce the progress made in physical therapy. It is important to stay consistent with your exercises to help facilitate and maximize your recovery! View at [email or web address] using code: [email or web address]

## 2023-12-08 ENCOUNTER — OFFICE VISIT (OUTPATIENT)
Dept: PHYSICAL THERAPY | Age: 63
End: 2023-12-08
Attending: NURSE PRACTITIONER
Payer: COMMERCIAL

## 2023-12-08 PROCEDURE — 97110 THERAPEUTIC EXERCISES: CPT

## 2023-12-08 PROCEDURE — 97140 MANUAL THERAPY 1/> REGIONS: CPT

## 2023-12-08 NOTE — PROGRESS NOTES
Date of Service: 12/8/2023  Dx: S/P lumbar laminectomy (R49.060), Lumbar radiculopathy, acute (M54.16)        DOI/S: 11/6/23      Insurance: N/A  Insurance Limits: Slim Dorman required  Visit #: 2  Authorized # of Visits: 12  POC/Auth Expiration: 12/31/23  Date of Last PN: 12/5/23 (Visit #1)  Authorizing Physician/Provider: Kristy Solitario MD visit: N/A        Precautions: None            Subjective: \"My back is doing very good. \" He reports good tolerance and compliance with his HEP. He reports that when he does the hamstring stretch, he does feel a burn in his hamstring. Objective:     Pain/Symptom Presentation:  Pain at rest: 0/10  Pain at worst: 3/10    HEP: Patient was issued a HEP handout 12/5/2023 including hamstring stretch with strap, DL bridging, S/L clamshell, hooklying PPT, and modified downward dog. Treatment:    Therapeutic Exercise: x 21min  Manual hamstring stretch: x 30\" (B)   Hamstring stretch with strap: x 30\" (B)   DL bridging: 2 x 10   DL bridge march: cannot perform on the left   Seated hamstring curl: 2 x 10 (L), green theraband  LAQ: x 20 (L), 6# ankle weight  Elastic band hip ABD and ext: 2 x 10 (B), yellow theraband   Shuttle DLP: x 20, 5 cords   Shuttle SLP: x 20 (L) 4 cords, x 20 (R), 5 cords      Neuromuscular Re-education: x 4min  Hooklying PPT: 2 x 10 x 3\"   Akbar 90-90 hold: x 30\" - cramping     Manual Therapy: x 15min  Lumbar PA accessory joint mobs - L1-L5: Grade 2, 4 x 10\" each   Thoracic PA accessory joint mobs - T1-T12: Grade 3, 2 x 10\" each   Soft tissue mobilization: lumbar paraspinals and low back musculature    Provider Interactions With Patient:   Added therapeutic exercises as documented, with cueing provided throughout performance to ensure correct technique during exercise. Assessment: Ed reports good tolerance to his last PT session and to his HEP. The patient is still with remaining strength deficits on his left leg compared to his right.  He struggled with DL bridge marching holding up on his left leg with no problems on his right. We added left hamstring and glute strengthening exercises this date to address remaining imbalances. The patient would benefit from continued therapy for further progression in strength and stability for improved tolerance to ADL's. Goals:   Short-Term Goals:  Patient will improve low back mobility and postural endurance to maintain proper posture with neutral lumbar spine and pelvic position while sitting to facilitate 4 hours of pain-free sitting. Timeframe: 3 weeks. Long-Term Goals:  Patient will improve low back pain and core endurance to facilitate walking distances of 2 miles daily to facilitate household and community ambulation. Timeframe: 6 weeks. Patient will improve LE mobility, strength, and single-leg stabilization to meet strength requirements for reciprocal stair navigation pattern with no asymmetries for ascending and descending 3 flights of stairs daily for community integration. Timeframe: 6-8 weeks. Patient will demonstrates safe and proper lifting mechanics with intermittent lifting objects of 35lbs from a lower height to facilitate housework and lifting ADL's. Timeframe: 6-8 weeks. Patient will improve Modified Oswestry Low Back Pain Disability Questionnaire score by 10 points or 10% to indicate a true change in improved function for ADL's and restoring PLF. Timeframe: 6-8 weeks. Plan: Continue to progress LE strength and core stability. Charges:  Therex x 2, MT x 1          Total Timed Treatment: 40min    Total Treatment Time: 40min

## 2023-12-13 ENCOUNTER — OFFICE VISIT (OUTPATIENT)
Dept: PHYSICAL THERAPY | Age: 63
End: 2023-12-13
Attending: NURSE PRACTITIONER
Payer: COMMERCIAL

## 2023-12-13 PROCEDURE — 97110 THERAPEUTIC EXERCISES: CPT

## 2023-12-13 PROCEDURE — 97140 MANUAL THERAPY 1/> REGIONS: CPT

## 2023-12-13 NOTE — PATIENT INSTRUCTIONS
Thank you for coming to your physical therapy appt! During your appt today you were issued a HEP handout from [email or web address] which can also be accessed using the information below. The exercises chosen are meant to supplement the treatment you received and reinforce the progress made in physical therapy. It is important to stay consistent with your exercises to help facilitate and maximize your recovery! View at [email or web address] using code: [id number]

## 2023-12-13 NOTE — PROGRESS NOTES
Date of Service: 12/13/2023  Dx: S/P lumbar laminectomy (S74.324), Lumbar radiculopathy, acute (M54.16)        DOI/S: 11/6/23      Insurance: N/A  Insurance Limits: Flor Villagomez required  Visit #: 3  Authorized # of Visits: 12  POC/Auth Expiration: 12/31/23  Date of Last PN: 12/5/23 (Visit #1)  Authorizing Physician/Provider: Yair Solitario MD visit: N/A        Precautions: None            Subjective: The patient reports that he injured his shoulder two weeks ago and is still having some pain over the right chest area. He denies any SOB, difficulty breathing, or radicular-type symptoms. Objective:     Pain/Symptom Presentation:  Pain at rest: 0/10  Pain at worst: 3/10    HEP: Patient was issued a HEP handout 12/13/2023 including DL bridging, 90-90 akbar hold, SLR, seated boat pose, and elastic band hip ABD and ext. Treatment:    Therapeutic Exercise: x 20min  Manual hamstring stretch: x 30\" (B)   SLR: x 20 (B)   DL bridging: 2 x 10, heel bias to minimize cramping  LAQ: x 20 (L), 8# ankle weight  Seated hamstring curl: 2 x 10 (L), green theraband  Elastic band hip ABD and ext: 2 x 10 (B), yellow theraband   Shuttle DLP: x 20, 6 cords   Shuttle SLP: x 20 (L) 4 cords, x 20 (R), 5 cords    HEP update: Reviewed new HEP handout with new exercises and progressions, provided verbal and written instructions/cueing for proper technique and common errors/compensations as needed. Reviewed the importance of compliance with home exercise program to facilitate recovery and meet long-term goals.       Neuromuscular Re-education: x 5min  Akbar 90-90 hold: x 30\" - mild left hip flexor/quad cramping  Seated boat pose: 1 x 10     Manual Therapy: x 15min  Lumbar PA accessory joint mobs - L1-L5: Grade 2, 4 x 10\" each   Thoracic PA accessory joint mobs - T1-T12: Grade 3, 2 x 10\" each   Soft tissue mobilization: lumbar paraspinals and low back musculature    Provider Interactions With Patient:   Added therapeutic exercises as documented, with cueing provided throughout performance to ensure correct technique during exercise. Assessment: Ed continues to remain very compliant with his HEP. He shows improved strength and endurance compared to previous episodes of care. We continue to progress strengthening exercises, though the patient admits to some fatigue at the end of his session today. The patient was issued an updated HEP to reflect recent progressions in his exercise regimen to facilitate additional progression in mobility and strength. We will continue to progress the patient's LE strength and stability as tolerated. Goals:   Short-Term Goals:  Patient will improve low back mobility and postural endurance to maintain proper posture with neutral lumbar spine and pelvic position while sitting to facilitate 4 hours of pain-free sitting. Timeframe: 3 weeks. Long-Term Goals:  Patient will improve low back pain and core endurance to facilitate walking distances of 2 miles daily to facilitate household and community ambulation. Timeframe: 6 weeks. Patient will improve LE mobility, strength, and single-leg stabilization to meet strength requirements for reciprocal stair navigation pattern with no asymmetries for ascending and descending 3 flights of stairs daily for community integration. Timeframe: 6-8 weeks. Patient will demonstrates safe and proper lifting mechanics with intermittent lifting objects of 35lbs from a lower height to facilitate housework and lifting ADL's. Timeframe: 6-8 weeks. Patient will improve Modified Oswestry Low Back Pain Disability Questionnaire score by 10 points or 10% to indicate a true change in improved function for ADL's and restoring PLF. Timeframe: 6-8 weeks. Plan: Follow up on tolerance to updated HEP. Charges:  Therex x 2, MT x 1          Total Timed Treatment: 40min    Total Treatment Time: 40min

## 2023-12-15 ENCOUNTER — OFFICE VISIT (OUTPATIENT)
Dept: PHYSICAL THERAPY | Age: 63
End: 2023-12-15
Attending: NURSE PRACTITIONER
Payer: COMMERCIAL

## 2023-12-15 DIAGNOSIS — E11.40 TYPE 2 DIABETES MELLITUS WITH DIABETIC NEUROPATHY, WITHOUT LONG-TERM CURRENT USE OF INSULIN (HCC): ICD-10-CM

## 2023-12-15 PROCEDURE — 97110 THERAPEUTIC EXERCISES: CPT

## 2023-12-15 PROCEDURE — 97140 MANUAL THERAPY 1/> REGIONS: CPT

## 2023-12-15 RX ORDER — PEN NEEDLE, DIABETIC 32GX 5/32"
1 NEEDLE, DISPOSABLE MISCELLANEOUS DAILY
Qty: 100 EACH | Refills: 1 | Status: SHIPPED | OUTPATIENT
Start: 2023-12-15

## 2023-12-15 NOTE — PROGRESS NOTES
Date of Service: 12/15/2023  Dx: S/P lumbar laminectomy (D43.488), Lumbar radiculopathy, acute (M54.16)        DOI/S: 11/6/23      Insurance: N/A  Insurance Limits: auth required  Visit #: 4  Authorized # of Visits: 12  POC/Auth Expiration: 12/31/23  Date of Last PN: 12/5/23 (Visit #1)  Authorizing Physician/Provider: Emerson Solitario MD visit: 12/19/23        Precautions: None            Subjective: The patient reports that his arm is still bothering him \"a ton. \" The patient repots that his legs are feeling weak today. He reports that he has been doing more work carrying [employer] up out of the basement. Objective:     Pain/Symptom Presentation:  Pain at rest: 0/10  Pain at worst: 3/10    HEP: Patient was issued a HEP handout 12/13/2023 including DL bridging, 90-90 adelina hold, SLR, seated boat pose, and elastic band hip ABD and ext. Treatment:    Therapeutic Exercise: x 20min  Manual hamstring stretch: x 30\" (B)   DL bridging: 2 x 10, heel bias to minimize cramping  LAQ: x 20 (L), 8# ankle weight   Seated hamstring curl: 2 x 10 (L), green theraband  Elastic band hip ABD and ext: 2 x 10 (B), yellow theraband   Shuttle DLP: x 20, 6 cords   Shuttle SLP: x 20 (L) 4 cords, x 20 (R), 5 cords      Manual Therapy: x 15min  Lumbar PA accessory joint mobs - L1-L5: Grade 2, 4 x 10\" each   Thoracic PA accessory joint mobs - T1-T12: Grade 3, 2 x 10\" each   Soft tissue mobilization: lumbar paraspinals and low back musculature    Provider Interactions With Patient:   Added therapeutic exercises as documented, with cueing provided throughout performance to ensure correct technique during exercise. Assessment: Ed arrives today with some reports of increased fatigue and weakness since his last session. He was educated this may be due to muscle fatigue from recent progressions in his exercise regimen. We modified today's session to avoid further progression of symptoms.  We will continue to progress the patient's general LE strength as tolerated for improved tolerance to ADL's. Goals:   Short-Term Goals:  Patient will improve low back mobility and postural endurance to maintain proper posture with neutral lumbar spine and pelvic position while sitting to facilitate 4 hours of pain-free sitting. Timeframe: 3 weeks. Long-Term Goals:  Patient will improve low back pain and core endurance to facilitate walking distances of 2 miles daily to facilitate household and community ambulation. Timeframe: 6 weeks. Patient will improve LE mobility, strength, and single-leg stabilization to meet strength requirements for reciprocal stair navigation pattern with no asymmetries for ascending and descending 3 flights of stairs daily for community integration. Timeframe: 6-8 weeks. Patient will demonstrates safe and proper lifting mechanics with intermittent lifting objects of 35lbs from a lower height to facilitate housework and lifting ADL's. Timeframe: 6-8 weeks. Patient will improve Modified Oswestry Low Back Pain Disability Questionnaire score by 10 points or 10% to indicate a true change in improved function for ADL's and restoring PLF. Timeframe: 6-8 weeks. Plan: Follow up weakness and fatigue complaints and adjust treatment accordingly. Charges:  Therex x 1, MT x 1          Total Timed Treatment: 35min    Total Treatment Time: 35min

## 2023-12-15 NOTE — TELEPHONE ENCOUNTER
LOV: 10/17/2023    Last Refill:  Medication Quantity Refills Start End   BD PEN NEEDLE KATY 2ND GEN 32G X 4 MM Does not apply Misc 100 each 3 7/26/2022 --     RTC: N/A    Protocol: passed

## 2023-12-19 ENCOUNTER — OFFICE VISIT (OUTPATIENT)
Dept: SURGERY | Facility: CLINIC | Age: 63
End: 2023-12-19
Payer: COMMERCIAL

## 2023-12-19 VITALS
WEIGHT: 262 LBS | BODY MASS INDEX: 31.9 KG/M2 | HEIGHT: 76 IN | DIASTOLIC BLOOD PRESSURE: 66 MMHG | SYSTOLIC BLOOD PRESSURE: 130 MMHG | HEART RATE: 84 BPM

## 2023-12-19 DIAGNOSIS — Z98.890 S/P LUMBAR LAMINECTOMY: Primary | ICD-10-CM

## 2023-12-19 DIAGNOSIS — G89.18 POST-OP PAIN: ICD-10-CM

## 2023-12-19 DIAGNOSIS — M54.16 LUMBAR RADICULOPATHY, ACUTE: ICD-10-CM

## 2023-12-19 PROCEDURE — 3078F DIAST BP <80 MM HG: CPT | Performed by: NEUROLOGICAL SURGERY

## 2023-12-19 PROCEDURE — 3008F BODY MASS INDEX DOCD: CPT | Performed by: NEUROLOGICAL SURGERY

## 2023-12-19 PROCEDURE — 3075F SYST BP GE 130 - 139MM HG: CPT | Performed by: NEUROLOGICAL SURGERY

## 2023-12-19 PROCEDURE — 99024 POSTOP FOLLOW-UP VISIT: CPT | Performed by: NEUROLOGICAL SURGERY

## 2023-12-19 RX ORDER — TRAMADOL HYDROCHLORIDE 50 MG/1
50 TABLET ORAL EVERY 6 HOURS PRN
Qty: 40 TABLET | Refills: 0 | Status: SHIPPED | OUTPATIENT
Start: 2023-12-19

## 2023-12-19 NOTE — PROGRESS NOTES
Established patient:  Reason for follow up:   6 week post op, sx 11/06/23    Numeric Rating Scale:         Pain at Present:  0/10

## 2023-12-20 ENCOUNTER — TELEPHONE (OUTPATIENT)
Dept: PHYSICAL THERAPY | Facility: HOSPITAL | Age: 63
End: 2023-12-20

## 2023-12-20 ENCOUNTER — APPOINTMENT (OUTPATIENT)
Dept: PHYSICAL THERAPY | Age: 63
End: 2023-12-20
Attending: NURSE PRACTITIONER
Payer: COMMERCIAL

## 2023-12-22 ENCOUNTER — APPOINTMENT (OUTPATIENT)
Dept: PHYSICAL THERAPY | Age: 63
End: 2023-12-22
Attending: NURSE PRACTITIONER
Payer: COMMERCIAL

## 2024-01-03 ENCOUNTER — OFFICE VISIT (OUTPATIENT)
Dept: PHYSICAL THERAPY | Age: 64
End: 2024-01-03
Attending: NURSE PRACTITIONER
Payer: COMMERCIAL

## 2024-01-03 PROCEDURE — 97110 THERAPEUTIC EXERCISES: CPT

## 2024-01-03 PROCEDURE — 97140 MANUAL THERAPY 1/> REGIONS: CPT

## 2024-01-03 NOTE — PROGRESS NOTES
Date of Service: 1/3/2024   Dx: S/P lumbar laminectomy (Z98.890), Lumbar radiculopathy, acute (M54.16)        DOI/S: 11/6/23      Insurance: N/A  Insurance Limits: auth required  Visit #: 5  Authorized # of Visits: 5 visits auth to 3/31  POC/Auth Expiration: 12/31/23  Date of Last PN: 12/5/23 (Visit #1)  Authorizing Physician/Provider: Hammad   Next MD visit: 12/19/23        Precautions: None            Subjective: He is not taking any \"heavy duty\" pain medication. He states knees are aching. At times will take NSAID.   Neuropathy from mid shin down.  Goals: standing more than 4 min without pain, take a couple mile walk (CLOF: 1 mi on level ground but \"I would feel it\" ).     Currently: Walking every other day: <1/2 mi- after this feeling fine.     Objective:   Pain/Symptom Presentation:  Pain at rest: 0/10  Pain at worst: 3/10  Location: central low back     HEP: Patient was issued a HEP handout 12/13/2023 including DL bridging, 90-90 adelina hold, SLR, seated boat pose, and elastic band hip ABD and ext.   Also continuing: PPT, Clamshell  1/3/2024 added 90/90 marches in place of 90/90 adelina hold     Treatment:  Therapeutic Exercise: x 25min  Manual hamstring stretch: x 30\" (B)   DL bridging: 2 x 10, heel bias to minimize cramping- glut biased to reduce pain in lumbar spine   90/90 iso hold- review- deferred   90/90 marches 10 reps R/L   Boat pose hold 3 sec hold 5 reps   Shuttle SLP: x 20 (L) 4 cords, x 15 (L), 5 cords  - R deferred for R knee pain   Band row black 20 reps   Pulley row 7.5 # 18 reps     Manual Therapy: x 15min  Lumbar PA accessory joint mobs - L1-L5: Grade 2, 4 x 10\" each   Thoracic PA accessory joint mobs - T1-T12: Grade 3, 2 x 10\" each   Soft tissue mobilization: lumbar paraspinals and low back musculature    Provider Interactions With Patient:   Added therapeutic exercises as documented, with cueing provided throughout performance to ensure correct technique during exercise.    Assessment: Ed is  doing quite well with current HEP. Reviewed for mechanics we stressed importance of modifications to promote more isolated fatigue in core stabilizers and less stress in the spine. He was able to do this successfully during session-handouts provided for home. Pt has slight hypomobility in thoracic spine and slightly increased tone noted on the L paraspinals in lumbars spine. Manual intervention to address this. Educated on importance of walking program. Overall, Ed is doing well post operatively and would benefit from continued skilled PT intervention to progress standing and walking tolerance.     Goals:   Short-Term Goals:  Patient will improve low back mobility and postural endurance to maintain proper posture with neutral lumbar spine and pelvic position while sitting to facilitate 4 hours of pain-free sitting. Timeframe: 3 weeks.  Long-Term Goals:  Patient will improve low back pain and core endurance to facilitate walking distances of 2 miles daily to facilitate household and community ambulation. Timeframe: 6 weeks.  Patient will improve LE mobility, strength, and single-leg stabilization to meet strength requirements for reciprocal stair navigation pattern with no asymmetries for ascending and descending 3 flights of stairs daily for community integration. Timeframe: 6-8 weeks.  Patient will demonstrates safe and proper lifting mechanics with intermittent lifting objects of 35lbs from a lower height to facilitate housework and lifting ADL's. Timeframe: 6-8 weeks.  Patient will improve Modified Oswestry Low Back Pain Disability Questionnaire score by 10 points or 10% to indicate a true change in improved function for ADL's and restoring PLF. Timeframe: 6-8 weeks.    Plan: Continue per plan of care.       Charges: Therex x 2, MT x 1          Total Timed Treatment: 40 min    Total Treatment Time: 40min

## 2024-01-09 ENCOUNTER — APPOINTMENT (OUTPATIENT)
Dept: PHYSICAL THERAPY | Age: 64
End: 2024-01-09
Attending: NURSE PRACTITIONER
Payer: COMMERCIAL

## 2024-01-09 ENCOUNTER — TELEPHONE (OUTPATIENT)
Dept: PHYSICAL THERAPY | Facility: HOSPITAL | Age: 64
End: 2024-01-09

## 2024-01-11 ENCOUNTER — OFFICE VISIT (OUTPATIENT)
Dept: PHYSICAL THERAPY | Age: 64
End: 2024-01-11
Attending: NURSE PRACTITIONER
Payer: COMMERCIAL

## 2024-01-11 PROCEDURE — 97140 MANUAL THERAPY 1/> REGIONS: CPT

## 2024-01-11 PROCEDURE — 97110 THERAPEUTIC EXERCISES: CPT

## 2024-01-11 NOTE — PROGRESS NOTES
Dx: S/P lumbar laminectomy (Z98.890), Lumbar radiculopathy, acute (M54.16)        DOI/S: 11/6/23      Insurance: N/A  Insurance Limits: auth required  Authorized # of Visits: 5 visits auth to 3/31  POC/Auth Expiration: 12/31/23  Date of Last PN: 12/5/23 (Visit #1)  Authorizing Physician/Provider: Hammad Solitario MD visit: 12/19/23        Precautions: None            Subjective: \"everything seems to be doing better\"   Neuropathy from mid shin down.  Standing/walking tolerance- he was able to snowblow sidewalks- was achey after.   Goals: standing more than 4 min without pain, take a couple mile walk (CLOF: 1 mi on level ground but \"I would feel it\" ).     Currently: Walking every other day: <1/2 mi- after this feeling fine.     Objective:   Pain/Symptom Presentation:  Pain at rest: 0/10  Pain at worst: 3/10  Location: central low back     HEP: Access Code: [email or web address]  URL: [email or web address]  Date: 01/11/2024  Prepared by: Keeley Solano    Exercises  - Supine Bridge  - 1 x daily - 7 x weekly - 1 sets - 10 reps  - Clamshell  - 1 x daily - 7 x weekly - 2 sets - 10 reps - green band  - Supine Active Straight Leg Raise  - 1 x daily - 7 x weekly - 2 sets - 10 reps  - Supine March  - 1 x daily - 7 x weekly - 2 sets - 10 reps  - Boat Pose With Breath and Isometric Abdominal Hold  - 1 x daily - 7 x weekly - 10 reps - 10 hold  - Side Stepping with Resistance at Ankles  - 1 x daily - 7 x weekly - 3 sets - 10 reps  - Standing Hip Extension with Counter Support  - 1 x daily - 7 x weekly - 2 sets - 10 reps  - Half Kneeling Hip Flexor Stretch  - 1 x daily - 7 x weekly - 3 sets - 1 reps - 30 hold    Treatment:  Date: 1/8/2024  TX#: 5/9 Date:   1/11/2024               TX#: 6/9 Date:                 TX#: 4/ Date:                 TX#: 5/ Date:   Tx#: 6/   Therapeutic Exercise: x 25min  Manual hamstring stretch: x 30\" (B)     DL bridging: 2 x 10, heel bias to minimize cramping- glut biased to reduce pain in lumbar spine      90/90 iso hold- review- deferred     90/90 marches 10 reps R/L     Boat pose hold 3 sec hold 5 reps     Shuttle SLP: x 20 (L) 4 cords, x 15 (L), 5 cords  - R deferred for R knee pain     Band row black 20 reps     Pulley row 7.5 # 18 reps  Therapeutic Exercise: x 25min  Upright bike level 2- 5 min   Bridge 10 reps   SLR 10 reps R/L   90/90 marches 10 reps R/L   Lateral walks 30' x 3 laps green band at mid shin   Hip flexor stretch 30 sec hold x 2 sets - half kneeling 30 sec hold 2 sets R/L   Mariya row 7.5 # 18 reps                 Manual Therapy: x 15min    Lumbar PA accessory joint mobs - L1-L5: Grade 2, 4 x 10\" each     Thoracic PA accessory joint mobs - T1-T12: Grade 3, 2 x 10\" each     Soft tissue mobilization: lumbar paraspinals and low back musculature Manual Therapy: x 15min    Lumbar PA accessory joint mobs - L1-L5: Grade 2, 4 x 10\" each     Thoracic PA accessory joint mobs - T1-T12: Grade 3, 2 x 10\" each     Soft tissue mobilization: lumbar paraspinals and low back musculature                      Assessment: Ed has goals to improve standing walking tolerance. Progressed standing time and there ex for HEP to address this. Additionally, added hip flexor stretch to reduce strain on the spine with prolonged extension. Progressing core strengthening. At this time pt is doing very well. HEP updated and will assess lasting tolerance.     Goals:   Short-Term Goals:  Patient will improve low back mobility and postural endurance to maintain proper posture with neutral lumbar spine and pelvic position while sitting to facilitate 4 hours of pain-free sitting. Timeframe: 3 weeks.  Long-Term Goals:  Patient will improve low back pain and core endurance to facilitate walking distances of 2 miles daily to facilitate household and community ambulation. Timeframe: 6 weeks.  Patient will improve LE mobility, strength, and single-leg stabilization to meet strength requirements for reciprocal stair navigation pattern with  no asymmetries for ascending and descending 3 flights of stairs daily for community integration. Timeframe: 6-8 weeks.  Patient will demonstrates safe and proper lifting mechanics with intermittent lifting objects of 35lbs from a lower height to facilitate housework and lifting ADL's. Timeframe: 6-8 weeks.  Patient will improve Modified Oswestry Low Back Pain Disability Questionnaire score by 10 points or 10% to indicate a true change in improved function for ADL's and restoring PLF. Timeframe: 6-8 weeks.    Plan: Continue per plan of care.       Charges: Therex x 2, MT x 1          Total Timed Treatment: 40 min    Total Treatment Time: 40min

## 2024-01-16 ENCOUNTER — OFFICE VISIT (OUTPATIENT)
Dept: PHYSICAL THERAPY | Age: 64
End: 2024-01-16
Attending: NURSE PRACTITIONER
Payer: COMMERCIAL

## 2024-01-16 PROCEDURE — 97140 MANUAL THERAPY 1/> REGIONS: CPT

## 2024-01-16 PROCEDURE — 97110 THERAPEUTIC EXERCISES: CPT

## 2024-01-16 NOTE — PROGRESS NOTES
Dx: S/P lumbar laminectomy (Z98.890), Lumbar radiculopathy, acute (M54.16)        DOI/S: 11/6/23      Insurance: N/A  Insurance Limits: auth required  Authorized # of Visits: 5 visits auth to 3/31  POC/Auth Expiration: 12/31/23  Date of Last PN: 12/5/23 (Visit #1)  Authorizing Physician/Provider: Hammad   Next MD visit: none      - no more   Precautions: None            Subjective: He states he felt only appropriate muscle soreness since last therapy session.     Neuropathy from mid shin down. Constant L thigh tightness- chronically, unchanged since surgery.   Standing/walking tolerance- he was able to snowblow sidewalks- was achey after.   Goals: standing more than 4 min without pain, take a couple mile walk (CLOF: 1 mi on level ground but \"I would feel it\" ).     Currently: walking tolerance - level surface with hiking boots- could do 1 mile - more his foot than any other symptoms.     Objective:   Core strength: 3/5     Pain/Symptom Presentation:  Pain at rest: 0/10  Pain at worst: 3/10  Location: central low back     HEP: Access Code: [email or web address]  URL: [email or web address]  Date: 01/11/2024  Prepared by: Keeley Solano    Exercises  - Supine Bridge  - 1 x daily - 7 x weekly - 1 sets - 10 reps  - Clamshell  - 1 x daily - 7 x weekly - 2 sets - 10 reps - green band  - Supine Active Straight Leg Raise  - 1 x daily - 7 x weekly - 2 sets - 10 reps  - Supine March  - 1 x daily - 7 x weekly - 2 sets - 10 reps  - Boat Pose With Breath and Isometric Abdominal Hold  - 1 x daily - 7 x weekly - 10 reps - 10 hold  - Side Stepping with Resistance at Ankles  - 1 x daily - 7 x weekly - 3 sets - 10 reps  - Standing Hip Extension with Counter Support  - 1 x daily - 7 x weekly - 2 sets - 10 reps  - Half Kneeling Hip Flexor Stretch  - 1 x daily - 7 x weekly - 3 sets - 1 reps - 30 hold    Treatment:  Date: 1/8/2024  TX#: 5/9 Date:   1/11/2024               TX#: 6/9 Date:   1/16/2024               TX#: 4/9 Date:                  TX#: 5/ Date:   Tx#: 6/   Therapeutic Exercise: x 25min  Manual hamstring stretch: x 30\" (B)     DL bridging: 2 x 10, heel bias to minimize cramping- glut biased to reduce pain in lumbar spine     90/90 iso hold- review- deferred     90/90 marches 10 reps R/L     Boat pose hold 3 sec hold 5 reps     Shuttle SLP: x 20 (L) 4 cords, x 15 (L), 5 cords  - R deferred for R knee pain     Band row black 20 reps     Pulley row 7.5 # 18 reps  Therapeutic Exercise: x 25min  Upright bike level 2- 5 min   Bridge 10 reps   SLR 10 reps R/L   90/90 marches 10 reps R/L   Lateral walks 30' x 3 laps green band at mid shin   Hip flexor stretch 30 sec hold x 2 sets - half kneeling 30 sec hold 2 sets R/L   Mariya row 7.5 # 18 reps            Therapeutic Exercise: x 25min  Upright bike level 2- 5 min  Bridge 10 reps 8 sec hold   Supine 90/90 marches 10 reps R/L - progressive tap out   ASLR with PPT and 4# ankle weight R: 18 reps *LB fatigue; L: 20 reps   TRX row 10 reps   Lateral walks green band at ankles 30' x 2 laps          Manual Therapy: x 15min    Lumbar PA accessory joint mobs - L1-L5: Grade 2, 4 x 10\" each     Thoracic PA accessory joint mobs - T1-T12: Grade 3, 2 x 10\" each     Soft tissue mobilization: lumbar paraspinals and low back musculature Manual Therapy: x 15min    Lumbar PA accessory joint mobs - L1-L5: Grade 2, 4 x 10\" each     Thoracic PA accessory joint mobs - T1-T12: Grade 3, 2 x 10\" each     Soft tissue mobilization: lumbar paraspinals and low back musculature Manual Therapy: x 15min    Lumbar PA accessory joint mobs - L1-L5: Grade 2, 4 x 10\" each     Thoracic PA accessory joint mobs - T1-T12: Grade 3, 2 x 10\" each     Soft tissue mobilization: lumbar paraspinals and low back musculature                     Assessment: Ed has more limitations by L foot for standing/walking tolerance than low back pain complaints. He has limited core strength demonstrated with MMT today- however, progressed HEP to address this.  Endurance limitations in BLE 2/2 to chronic low back pain and neuropathy. Progressed standing and CKC strengthening as tolerated. Overall, pt is progressing well and would benefit from continued skilled PT intervention.     Goals:   Short-Term Goals:  Patient will improve low back mobility and postural endurance to maintain proper posture with neutral lumbar spine and pelvic position while sitting to facilitate 4 hours of pain-free sitting. Timeframe: 3 weeks.  Long-Term Goals:  Patient will improve low back pain and core endurance to facilitate walking distances of 2 miles daily to facilitate household and community ambulation. Timeframe: 6 weeks.  Patient will improve LE mobility, strength, and single-leg stabilization to meet strength requirements for reciprocal stair navigation pattern with no asymmetries for ascending and descending 3 flights of stairs daily for community integration. Timeframe: 6-8 weeks.  Patient will demonstrates safe and proper lifting mechanics with intermittent lifting objects of 35lbs from a lower height to facilitate housework and lifting ADL's. Timeframe: 6-8 weeks.  Patient will improve Modified Oswestry Low Back Pain Disability Questionnaire score by 10 points or 10% to indicate a true change in improved function for ADL's and restoring PLF. Timeframe: 6-8 weeks.    Plan: Continue per plan of care. Plan for next therapy session: assess knee pain- consider wall squat/press out, continue core strengthening       Charges: Therex x 2, MT x 1          Total Timed Treatment: 40 min    Total Treatment Time: 40min

## 2024-01-23 ENCOUNTER — APPOINTMENT (OUTPATIENT)
Dept: PHYSICAL THERAPY | Age: 64
End: 2024-01-23
Attending: NURSE PRACTITIONER
Payer: COMMERCIAL

## 2024-01-25 ENCOUNTER — OFFICE VISIT (OUTPATIENT)
Dept: PHYSICAL THERAPY | Age: 64
End: 2024-01-25
Attending: NURSE PRACTITIONER
Payer: COMMERCIAL

## 2024-01-25 PROCEDURE — 97110 THERAPEUTIC EXERCISES: CPT

## 2024-01-25 NOTE — PROGRESS NOTES
Dx: S/P lumbar laminectomy (Z98.890), Lumbar radiculopathy, acute (M54.16)        DOI/S: 11/6/23      Insurance: N/A  Insurance Limits: auth required  Authorized # of Visits: 5 visits auth to 3/31  POC/Auth Expiration: 12/31/23  Date of Last PN: 12/5/23 (Visit #1)  Authorizing Physician/Provider: Hammad   Next MD visit: none      - no more   Precautions: None            Subjective: He states continues to have L thigh tightness. He states no major issues since last therapy session     Neuropathy from mid shin down. Constant L thigh tightness- chronically, unchanged since surgery.   Standing/walking tolerance- he was able to snowblow sidewalks- was achey after.   Goals: standing more than 4 min without pain, take a couple mile walk (CLOF: 1 mi on level ground but \"I would feel it\" ).     Currently: walking tolerance - level surface with hiking boots- could do 1 mile - more his foot than any other symptoms.     Objective:   Core strength: 3/5     Pain/Symptom Presentation:  Pain at rest: 0/10  Pain at worst: 3/10  Location: central low back     HEP: Access Code: [email or web address]  URL: [email or web address]  Date: 01/11/2024  Prepared by: Keeley Solano    Exercises  - Supine Bridge  - 1 x daily - 7 x weekly - 1 sets - 10 reps  - Clamshell  - 1 x daily - 7 x weekly - 2 sets - 10 reps - green band  - Supine Active Straight Leg Raise  - 1 x daily - 7 x weekly - 2 sets - 10 reps  - Supine March  - 1 x daily - 7 x weekly - 2 sets - 10 reps  - Boat Pose With Breath and Isometric Abdominal Hold  - 1 x daily - 7 x weekly - 10 reps - 10 hold  - Side Stepping with Resistance at Ankles  - 1 x daily - 7 x weekly - 3 sets - 10 reps- green vs blue   - Standing Hip Extension with Counter Support  - 1 x daily - 7 x weekly - 2 sets - 10 reps  - Half Kneeling Hip Flexor Stretch  - 1 x daily - 7 x weekly - 3 sets - 1 reps - 30 hold  1/25/2024 standing quad stretch and roller stick to L thigh   Treatment:  Date: 1/8/2024  TX#: 5/9 Date:    1/11/2024               TX#: 6/9 Date:   1/16/2024               TX#: 4/9 Date:  1/25/2024                TX#: 5/9  Date:   Tx#: 6/   Therapeutic Exercise: x 25min  Manual hamstring stretch: x 30\" (B)     DL bridging: 2 x 10, heel bias to minimize cramping- glut biased to reduce pain in lumbar spine     90/90 iso hold- review- deferred     90/90 marches 10 reps R/L     Boat pose hold 3 sec hold 5 reps     Shuttle SLP: x 20 (L) 4 cords, x 15 (L), 5 cords  - R deferred for R knee pain     Band row black 20 reps     Pulley row 7.5 # 18 reps  Therapeutic Exercise: x 25min  Upright bike level 2- 5 min   Bridge 10 reps   SLR 10 reps R/L   90/90 marches 10 reps R/L   Lateral walks 30' x 3 laps green band at mid shin   Hip flexor stretch 30 sec hold x 2 sets - half kneeling 30 sec hold 2 sets R/L   Mariya row 7.5 # 18 reps            Therapeutic Exercise: x 25min  Upright bike level 2- 5 min  Bridge 10 reps 8 sec hold   Supine 90/90 marches 10 reps R/L - progressive tap out   ASLR with PPT and 4# ankle weight R: 18 reps *LB fatigue; L: 20 reps   TRX row 10 reps   Lateral walks green band at ankles 30' x 2 laps      Therapeutic Exercise: x 38 min  Upright bike level 2- 5 min  TRX squats 10 reps   Lat Pull down 15# on pulley R/L 20 reps   TRX row 15 reps   Monster walks - deferred   Lateral walk with blue band- deferred back to green band   Standing hip extension with ankle weight 5#- TCs for avoiding compensatory lumbar extension- PPT   Standing quad stretch 30 sec hold x 2 sets on the L   Seated boat pose with marches- 4 reps alt R/L   ASLR with PPT and 2# ankle weight R: 18 reps; L: 20 reps       Manual Therapy: x 15min    Lumbar PA accessory joint mobs - L1-L5: Grade 2, 4 x 10\" each     Thoracic PA accessory joint mobs - T1-T12: Grade 3, 2 x 10\" each     Soft tissue mobilization: lumbar paraspinals and low back musculature Manual Therapy: x 15min    Lumbar PA accessory joint mobs - L1-L5: Grade 2, 4 x 10\" each      Thoracic PA accessory joint mobs - T1-T12: Grade 3, 2 x 10\" each     Soft tissue mobilization: lumbar paraspinals and low back musculature Manual Therapy: x 15min    Lumbar PA accessory joint mobs - L1-L5: Grade 2, 4 x 10\" each     Thoracic PA accessory joint mobs - T1-T12: Grade 3, 2 x 10\" each     Soft tissue mobilization: lumbar paraspinals and low back musculature Manual Therapy: x 2 min  Roller stick to L quad 2 min                     Assessment: Ed has L thigh tightness which causes him limited resistance tolerance during quad strengthening. Today we did trial of roller stick and self stretching with at least temporary relief. Educated on HEP updates. Additionally we progressed lat strengthening, core strengthening. Deferred progression in CKC hip abduction strengthening 2/2 to mechanics and strain on spine. With TC pt was able to complete standing hip extensions without compensations from lumbar spine. Overall, Ed is progressing well.     Goals:   Short-Term Goals:  Patient will improve low back mobility and postural endurance to maintain proper posture with neutral lumbar spine and pelvic position while sitting to facilitate 4 hours of pain-free sitting. Timeframe: 3 weeks.  Long-Term Goals:  Patient will improve low back pain and core endurance to facilitate walking distances of 2 miles daily to facilitate household and community ambulation. Timeframe: 6 weeks.  Patient will improve LE mobility, strength, and single-leg stabilization to meet strength requirements for reciprocal stair navigation pattern with no asymmetries for ascending and descending 3 flights of stairs daily for community integration. Timeframe: 6-8 weeks.  Patient will demonstrates safe and proper lifting mechanics with intermittent lifting objects of 35lbs from a lower height to facilitate housework and lifting ADL's. Timeframe: 6-8 weeks.  Patient will improve Modified Oswestry Low Back Pain Disability Questionnaire score by 10  points or 10% to indicate a true change in improved function for ADL's and restoring PLF. Timeframe: 6-8 weeks.    Plan: Continue per plan of care. Plan for next therapy session: assess knee pain- consider wall squat/press out, continue core strengthening       Charges: Therex x 2, MT x 1          Total Timed Treatment: 38 min    Total Treatment Time: 38min

## 2024-01-31 ENCOUNTER — OFFICE VISIT (OUTPATIENT)
Dept: PHYSICAL THERAPY | Age: 64
End: 2024-01-31
Attending: NURSE PRACTITIONER
Payer: COMMERCIAL

## 2024-01-31 PROCEDURE — 97140 MANUAL THERAPY 1/> REGIONS: CPT

## 2024-01-31 PROCEDURE — 97110 THERAPEUTIC EXERCISES: CPT

## 2024-01-31 NOTE — PROGRESS NOTES
Dx: S/P lumbar laminectomy (Z98.890), Lumbar radiculopathy, acute (M54.16)       L4-5  DOI/S: 11/6/23      Insurance: N/A  Insurance Limits: auth required  Authorized # of Visits: 5 visits auth to 3/31  POC/Auth Expiration: 12/31/23  Date of Last PN: 12/5/23 (Visit #1)  Authorizing Physician/Provider: Fernandoash   Next MD visit: none      - no more   Precautions: None            Subjective: He states continues to have L thigh tightness. He states no major issues since last therapy session. Some tenderness in the low back since surgery. Feeling it more the last week but unsure of why. States knees are tender chronically but possibly more notable with squats at home.     Neuropathy from mid shin down. Constant L thigh tightness- chronically, unchanged since surgery.   Standing/walking tolerance- he was able to snowblow sidewalks- was achey after.   Goals: standing more than 4 min without pain, take a couple mile walk (CLOF: 1 mi on level ground but \"I would feel it\" ).     Currently: walking tolerance - level surface with hiking boots- could do 1 mile - more his foot than any other symptoms.     Objective:   Core strength: 3/5     Pain/Symptom Presentation:  Pain at rest: 0/10  Pain at worst: 3/10  Location: central low back     HEP: Access Code: [email or web address]  URL: [email or web address]  Date: 01/11/2024  Prepared by: Keeley Solano    Exercises  - Supine Bridge  - 1 x daily - 7 x weekly - 1 sets - 10 reps  - Clamshell  - 1 x daily - 7 x weekly - 2 sets - 10 reps - green band  - Supine Active Straight Leg Raise  - 1 x daily - 7 x weekly - 2 sets - 10 reps  - Supine March  - 1 x daily - 7 x weekly - 2 sets - 10 reps  - Boat Pose With Breath and Isometric Abdominal Hold  - 1 x daily - 7 x weekly - 10 reps - 10 hold  - Side Stepping with Resistance at Ankles  - 1 x daily - 7 x weekly - 3 sets - 10 reps- green vs blue   - Standing Hip Extension with Counter Support  - 1 x daily - 7 x weekly - 2 sets - 10 reps  - Half  Kneeling Hip Flexor Stretch  - 1 x daily - 7 x weekly - 3 sets - 1 reps - 30 hold  1/25/2024 standing quad stretch and roller stick to L thigh   1/31/2024 upper body strengthening trial with chair yoga    Treatment:  Date: 1/8/2024  TX#: 5/9 Date:   1/11/2024               TX#: 6/9 Date:   1/16/2024               TX#: 4/9 Date:  1/25/2024                TX#: 5/9  Date: 1/31/2024   Tx#: 6/9    Therapeutic Exercise: x 25min  Manual hamstring stretch: x 30\" (B)     DL bridging: 2 x 10, heel bias to minimize cramping- glut biased to reduce pain in lumbar spine     90/90 iso hold- review- deferred     90/90 marches 10 reps R/L     Boat pose hold 3 sec hold 5 reps     Shuttle SLP: x 20 (L) 4 cords, x 15 (L), 5 cords  - R deferred for R knee pain     Band row black 20 reps     Pulley row 7.5 # 18 reps  Therapeutic Exercise: x 25min  Upright bike level 2- 5 min   Bridge 10 reps   SLR 10 reps R/L   90/90 marches 10 reps R/L   Lateral walks 30' x 3 laps green band at mid shin   Hip flexor stretch 30 sec hold x 2 sets - half kneeling 30 sec hold 2 sets R/L   Mariya row 7.5 # 18 reps            Therapeutic Exercise: x 25min  Upright bike level 2- 5 min  Bridge 10 reps 8 sec hold   Supine 90/90 marches 10 reps R/L - progressive tap out   ASLR with PPT and 4# ankle weight R: 18 reps *LB fatigue; L: 20 reps   TRX row 10 reps   Lateral walks green band at ankles 30' x 2 laps      Therapeutic Exercise: x 38 min  Upright bike level 2- 5 min  TRX squats 10 reps   Lat Pull down 15# on pulley R/L 20 reps   TRX row 15 reps   Monster walks - deferred   Lateral walk with blue band- deferred back to green band   Standing hip extension with ankle weight 5#- TCs for avoiding compensatory lumbar extension- PPT   Standing quad stretch 30 sec hold x 2 sets on the L   Seated boat pose with marches- 4 reps alt R/L   ASLR with PPT and 2# ankle weight R: 18 reps; L: 20 reps    Therapeutic Exercise: x 30 min  Upright bike level 2- 5 min  Side-lying  clam shell blue band 20 reps R/L   Straight leg bridge on swiss ball- blue 10 reps x 2 sets   Lat Pull down 15# on pulley R/L 20 reps   TRX row 10 reps x 2 sets  Wall push ups 10 reps x 2 sets   Walking 30' x 4 laps   Sidelying quad stretch 30 sec hold x 2 sets on the L   Seated boat pose with marches- 4 reps alt R/L   *cues for PPT and core engagement t/o standing there ex  Pt education: HEP updates ; seated chair yoga and upper body strengthening benefits        Manual Therapy: x 15min    Lumbar PA accessory joint mobs - L1-L5: Grade 2, 4 x 10\" each     Thoracic PA accessory joint mobs - T1-T12: Grade 3, 2 x 10\" each     Soft tissue mobilization: lumbar paraspinals and low back musculature Manual Therapy: x 15min    Lumbar PA accessory joint mobs - L1-L5: Grade 2, 4 x 10\" each     Thoracic PA accessory joint mobs - T1-T12: Grade 3, 2 x 10\" each     Soft tissue mobilization: lumbar paraspinals and low back musculature Manual Therapy: x 15min    Lumbar PA accessory joint mobs - L1-L5: Grade 2, 4 x 10\" each     Thoracic PA accessory joint mobs - T1-T12: Grade 3, 2 x 10\" each     Soft tissue mobilization: lumbar paraspinals and low back musculature Manual Therapy: x 2 min  Roller stick to L quad 2 min  Manual Therapy: x 15min    Lumbar PA accessory joint mobs - L1-L5: Grade 2, 4 x 10\" each     Thoracic PA accessory joint mobs - T1-T12: Grade 3, 2 x 10\" each     Soft tissue mobilization: lumbar paraspinals and low back musculature                   Assessment: Ed had some tenderness in the spine with extension AROM as well as there ex with tendency for anterior pelvic tilt. With cues for PPT and core engagement he was able to eliminate this pain. HEP Update for functional core strengthening.     Goals:   Short-Term Goals:  Patient will improve low back mobility and postural endurance to maintain proper posture with neutral lumbar spine and pelvic position while sitting to facilitate 4 hours of pain-free sitting.  Timeframe: 3 weeks.  Long-Term Goals:  Patient will improve low back pain and core endurance to facilitate walking distances of 2 miles daily to facilitate household and community ambulation. Timeframe: 6 weeks.  Patient will improve LE mobility, strength, and single-leg stabilization to meet strength requirements for reciprocal stair navigation pattern with no asymmetries for ascending and descending 3 flights of stairs daily for community integration. Timeframe: 6-8 weeks.  Patient will demonstrates safe and proper lifting mechanics with intermittent lifting objects of 35lbs from a lower height to facilitate housework and lifting ADL's. Timeframe: 6-8 weeks.  Patient will improve Modified Oswestry Low Back Pain Disability Questionnaire score by 10 points or 10% to indicate a true change in improved function for ADL's and restoring PLF. Timeframe: 6-8 weeks.    Plan: Continue per plan of care. Plan for next therapy session: assess knee pain- consider wall squat/press out, continue core strengthening       Charges: Therex x 2, MT x 1          Total Timed Treatment:  45min    Total Treatment Time: 45min

## 2024-02-08 ENCOUNTER — OFFICE VISIT (OUTPATIENT)
Dept: PHYSICAL THERAPY | Age: 64
End: 2024-02-08
Attending: NURSE PRACTITIONER
Payer: COMMERCIAL

## 2024-02-08 PROCEDURE — 97110 THERAPEUTIC EXERCISES: CPT

## 2024-02-08 NOTE — PROGRESS NOTES
Dx: S/P lumbar laminectomy (Z98.890), Lumbar radiculopathy, acute (M54.16)       L4-5  DOI/S: 11/6/23      Insurance: N/A  Insurance Limits: auth required  Authorized # of Visits: 5 visits auth to 3/31  POC/Auth Expiration: 12/31/23  Date of Last PN: 12/5/23 (Visit #1)  Authorizing Physician/Provider: Fernandoash   Next MD visit: none      - no more   Precautions: None            Subjective: He states soreness in the R side of the low back in the AMs only, neuropathy \"flared up\". Pt states the last 2 days he has not been able to do the whole circuit- struggles with side stepping with the band. He did progress himself to blue band- indep began squatting. He states overall he feels he can do more than he could 1-2 weeks ago. He states stairs are easier and not utilizing rail as much.     Goals: standing more than 4 min without pain, take a couple mile walk (CLOF: 1 mi on level ground but \"I would feel it\" ).    Currently: walking tolerance - level surface with hiking boots- could do 1 mile - more his foot than any other symptoms.     Objective:   Lumbar flexion extension WFL   SB: R/L WFL     Hip screen: unremarkable for pain- slightly hypermobile bilaterally     Core strength: 3/5     Pain/Symptom Presentation:  Pain at rest: 0/10  Pain at worst: 3/10  Location: [facility] low back     HEP: Access Code: [email or web address]  URL: [email or web address]  Date: 01/11/2024  Prepared by: Keeley Solano    Exercises  - Supine Bridge  - 1 x daily - 7 x weekly - 1 sets - 10 reps  - Clamshell  - 1 x daily - 7 x weekly - 2 sets - 10 reps - green band  - Supine Active Straight Leg Raise  - 1 x daily - 7 x weekly - 2 sets - 10 reps  - Supine March  - 1 x daily - 7 x weekly - 2 sets - 10 reps  - Boat Pose With Breath and Isometric Abdominal Hold  - 1 x daily - 7 x weekly - 10 reps - 10 hold  - Side Stepping with Resistance at Ankles  - 1 x daily - 7 x weekly - 3 sets - 10 reps- green vs blue   - Standing Hip Extension with Counter Support   - 1 x daily - 7 x weekly - 2 sets - 10 reps  - Half Kneeling Hip Flexor Stretch  - 1 x daily - 7 x weekly - 3 sets - 1 reps - 30 hold  1/25/2024 standing quad stretch and roller stick to L thigh   1/31/2024 upper body strengthening trial with chair yoga- push ups     Treatment:  Date: 1/8/2024  TX#: 5/9 Date:   1/11/2024               TX#: 6/9 Date:   1/16/2024               TX#: 4/9 Date:  1/25/2024                TX#: 5/9  Date: 1/31/2024   Tx#: 6/9 2/8/2024   Tx#: 7/9   Therapeutic Exercise: x 25min  Manual hamstring stretch: x 30\" (B)     DL bridging: 2 x 10, heel bias to minimize cramping- glut biased to reduce pain in lumbar spine     90/90 iso hold- review- deferred     90/90 marches 10 reps R/L     Boat pose hold 3 sec hold 5 reps     Shuttle SLP: x 20 (L) 4 cords, x 15 (L), 5 cords  - R deferred for R knee pain     Band row black 20 reps     Pulley row 7.5 # 18 reps  Therapeutic Exercise: x 25min  Upright bike level 2- 5 min   Bridge 10 reps   SLR 10 reps R/L   90/90 marches 10 reps R/L   Lateral walks 30' x 3 laps green band at mid shin   Hip flexor stretch 30 sec hold x 2 sets - half kneeling 30 sec hold 2 sets R/L   Mariya row 7.5 # 18 reps            Therapeutic Exercise: x 25min  Upright bike level 2- 5 min  Bridge 10 reps 8 sec hold   Supine 90/90 marches 10 reps R/L - progressive tap out   ASLR with PPT and 4# ankle weight R: 18 reps *LB fatigue; L: 20 reps   TRX row 10 reps   Lateral walks green band at ankles 30' x 2 laps      Therapeutic Exercise: x 38 min  Upright bike level 2- 5 min  TRX squats 10 reps   Lat Pull down 15# on pulley R/L 20 reps   TRX row 15 reps   Monster walks - deferred   Lateral walk with blue band- deferred back to green band   Standing hip extension with ankle weight 5#- TCs for avoiding compensatory lumbar extension- PPT   Standing quad stretch 30 sec hold x 2 sets on the L   Seated boat pose with marches- 4 reps alt R/L   ASLR with PPT and 2# ankle weight R: 18 reps; L:  20 reps    Therapeutic Exercise: x 30 min  Upright bike level 2- 5 min  Side-lying clam shell blue band 20 reps R/L   Straight leg bridge on swiss ball- blue 10 reps x 2 sets   Lat Pull down 15# on pulley R/L 20 reps   TRX row 10 reps x 2 sets  Wall push ups 10 reps x 2 sets   Walking 30' x 4 laps   Sidelying quad stretch 30 sec hold x 2 sets on the L   Seated boat pose with marches- 4 reps alt R/L   *cues for PPT and core engagement t/o standing there ex  Pt education: HEP updates ; seated chair yoga and upper body strengthening benefits      Therapeutic Exercise: x 38 min  Supine straight leg bridge on ball 10 reps x 2 sets  Swiss ball knee and hip flexion 25 reps   Seated boat pose with marches- 4 reps alt R/L   Sidelying quad stretch 30 sec hold x 2 sets on the L   TRX 15 reps   Lat Pull down 15# on pulley R/L 10 reps x 2 sets  Lat Pull down 15# on pulley R/L 20 reps   Wall push ups 10 reps x 2 sets   Supine KTC 30 sec hold R/L x 2 sets   LTR 10 reps - slow   Pt education: HEP modifications and benefits, indep progressions no more than 10% per week. What he is feeling vs when to modify exercises, goals, all questions/concerns addressed       Manual Therapy: x 15min    Lumbar PA accessory joint mobs - L1-L5: Grade 2, 4 x 10\" each     Thoracic PA accessory joint mobs - T1-T12: Grade 3, 2 x 10\" each     Soft tissue mobilization: lumbar paraspinals and low back musculature Manual Therapy: x 15min    Lumbar PA accessory joint mobs - L1-L5: Grade 2, 4 x 10\" each     Thoracic PA accessory joint mobs - T1-T12: Grade 3, 2 x 10\" each     Soft tissue mobilization: lumbar paraspinals and low back musculature Manual Therapy: x 15min    Lumbar PA accessory joint mobs - L1-L5: Grade 2, 4 x 10\" each     Thoracic PA accessory joint mobs - T1-T12: Grade 3, 2 x 10\" each     Soft tissue mobilization: lumbar paraspinals and low back musculature Manual Therapy: x 2 min  Roller stick to L quad 2 min  Manual Therapy: x 15min    Lumbar  PA accessory joint mobs - L1-L5: Grade 2, 4 x 10\" each     Thoracic PA accessory joint mobs - T1-T12: Grade 3, 2 x 10\" each     Soft tissue mobilization: lumbar paraspinals and low back musculature Manual Therapy: x 2 min  PROM screen only                     Assessment: Ed had unremarkable spine and hip mobility today. He had no pain with exception of end range LTR to the L. - educated on HEP modifications and avoiding progressing too much too fast to reduce risk for reinjury. Pt verbalized understanding. HEP was not progressed.     Goals:   Short-Term Goals:  Patient will improve low back mobility and postural endurance to maintain proper posture with neutral lumbar spine and pelvic position while sitting to facilitate 4 hours of pain-free sitting. Timeframe: 3 weeks.  Long-Term Goals:  Patient will improve low back pain and core endurance to facilitate walking distances of 2 miles daily to facilitate household and community ambulation. Timeframe: 6 weeks.  Patient will improve LE mobility, strength, and single-leg stabilization to meet strength requirements for reciprocal stair navigation pattern with no asymmetries for ascending and descending 3 flights of stairs daily for community integration. Timeframe: 6-8 weeks.  Patient will demonstrates safe and proper lifting mechanics with intermittent lifting objects of 35lbs from a lower height to facilitate housework and lifting ADL's. Timeframe: 6-8 weeks.  Patient will improve Modified Oswestry Low Back Pain Disability Questionnaire score by 10 points or 10% to indicate a true change in improved function for ADL's and restoring PLF. Timeframe: 6-8 weeks.    Plan: Continue per plan of care. Plan for next therapy session: assess knee pain- consider wall squat/press out, continue core strengthening       Charges: Therex x 3        Total Timed Treatment:  40min    Total Treatment Time: 40min

## 2024-02-14 ENCOUNTER — PATIENT MESSAGE (OUTPATIENT)
Dept: FAMILY MEDICINE CLINIC | Facility: CLINIC | Age: 64
End: 2024-02-14

## 2024-02-14 ENCOUNTER — OFFICE VISIT (OUTPATIENT)
Dept: PHYSICAL THERAPY | Age: 64
End: 2024-02-14
Attending: NURSE PRACTITIONER
Payer: COMMERCIAL

## 2024-02-14 DIAGNOSIS — Z12.11 SCREENING FOR MALIGNANT NEOPLASM OF COLON: Primary | ICD-10-CM

## 2024-02-14 PROCEDURE — 97110 THERAPEUTIC EXERCISES: CPT

## 2024-02-14 NOTE — TELEPHONE ENCOUNTER
From: Silvestre Riley Jr.  To: Inderjit Le  Sent: 2024 12:44 PM CST  Subject: Colonoscopy    Dr. Le, You recently suggested I do a colon cancer screening test. My father  of colon cancer in his mid 50's so, I get tested every couple of years. Dr. Zazueta's office ([facility]) reached out to remind me I was due. Can I get a referral to his office and would that satisfy my cancer screening? Please advise. Thank you so much.

## 2024-02-19 ENCOUNTER — PATIENT OUTREACH (OUTPATIENT)
Dept: FAMILY MEDICINE CLINIC | Facility: CLINIC | Age: 64
End: 2024-02-19

## 2024-02-21 ENCOUNTER — OFFICE VISIT (OUTPATIENT)
Dept: PHYSICAL THERAPY | Age: 64
End: 2024-02-21
Attending: NURSE PRACTITIONER
Payer: COMMERCIAL

## 2024-02-21 PROCEDURE — 97110 THERAPEUTIC EXERCISES: CPT

## 2024-02-21 PROCEDURE — 97140 MANUAL THERAPY 1/> REGIONS: CPT

## 2024-02-21 NOTE — PROGRESS NOTES
Dx: S/P lumbar laminectomy (Z98.890), Lumbar radiculopathy, acute (M54.16)       L4-5  DOI/S: 11/6/23      Insurance: N/A  Insurance Limits: auth required  Authorized # of Visits:  10 visits auth to 3/31  POC/Auth Expiration: 12/31/23  Date of Last PN: 12/5/23 (Visit #1)  Authorizing Physician/Provider: [employer]   Next MD visit: none      - no more   Precautions: None            Subjective: He states last few days. Tightness in L thigh. Knees achy. He states minna bad over the weekend. He states knee braces distracted from pain. He states 'he is using things he has not used in awhile\"     Low back: was achey this morning- took tramadol.   Going up and down stairs and walking prolonged    Goals: standing 4 min- improved to 10 min before onset of soreness, take a couple mile walk (CLOF: 1 mi on level ground but \"I would feel it\" ).    Currently: walking tolerance - level surface with hiking boots- could do 1 mile - more his foot than any other symptoms.     Objective:   Patellar mobility: grossly hypermobile on the L compared to the R  Patellar compression: R *pain: L WFL   Palpation: ITB insertion *tender on the R      Knee screen: unremarkable with OP and PROM, no pain with MMT knee flexion/extension 5/5 BL     Lumbar flexion extension FT to floor no pain or changes in symptoms   Seated supported flexion *relief of R side low back pain during therapy session   Standing repeated extension *R side low back pain     SB: R/L WFL     Hip screen: unremarkable for pain- slightly hypermobile bilaterally     Core strength: 3/5     Pain/Symptom Presentation:  Pain at rest: 0/10  Pain at worst: 3/10  Location: central low back     HEP: AAccess Code: [email or web address]  URL: [email or web address]  Date: 02/21/2024  Prepared by: Keeley Solano    Exercises  - Small Range Straight Leg Raise  - 1 x daily - 7 x weekly - 2 sets - 20 reps  - Single Leg Stance  - 1 x daily - 7 x weekly - 3 sets - 1 reps - 30 hold  - Side Stepping with  Resistance at Ankles  - 1 x daily - 7 x weekly - 2 sets - 10 reps  - Bridge with Heels on Swiss Ball  - 1 x daily - 7 x weekly - 2 sets - 10 reps  - Supine Hamstring Stretch with Strap  - 1 x daily - 7 x weekly - 3 sets - 1 reps - 30 hold    Treatment:  Date:   1/16/2024               TX#: 4/9 Date:  1/25/2024                TX#: 5/9  Date: 1/31/2024   Tx#: 6/9 2/8/2024   Tx#: 7/9 2/14/2024   Tx#: 8/9 2/21/2024   Tx#: 9/9   Therapeutic Exercise: x 25min  Upright bike level 2- 5 min  Bridge 10 reps 8 sec hold   Supine 90/90 marches 10 reps R/L - progressive tap out   ASLR with PPT and 4# ankle weight R: 18 reps *LB fatigue; L: 20 reps   TRX row 10 reps   Lateral walks green band at ankles 30' x 2 laps      Therapeutic Exercise: x 38 min  Upright bike level 2- 5 min  TRX squats 10 reps   Lat Pull down 15# on pulley R/L 20 reps   TRX row 15 reps   Monster walks - deferred   Lateral walk with blue band- deferred back to green band   Standing hip extension with ankle weight 5#- TCs for avoiding compensatory lumbar extension- PPT   Standing quad stretch 30 sec hold x 2 sets on the L   Seated boat pose with marches- 4 reps alt R/L   ASLR with PPT and 2# ankle weight R: 18 reps; L: 20 reps    Therapeutic Exercise: x 30 min  Upright bike level 2- 5 min  Side-lying clam shell blue band 20 reps R/L   Straight leg bridge on swiss ball- blue 10 reps x 2 sets   Lat Pull down 15# on pulley R/L 20 reps   TRX row 10 reps x 2 sets  Wall push ups 10 reps x 2 sets   Walking 30' x 4 laps   Sidelying quad stretch 30 sec hold x 2 sets on the L   Seated boat pose with marches- 4 reps alt R/L   *cues for PPT and core engagement t/o standing there ex  Pt education: HEP updates ; seated chair yoga and upper body strengthening benefits      Therapeutic Exercise: x 38 min  Supine straight leg bridge on ball 10 reps x 2 sets  Swiss ball knee and hip flexion 25 reps   Seated boat pose with marches- 4 reps alt R/L   Sidelying quad stretch 30 sec  hold x 2 sets on the L   TRX 15 reps   Lat Pull down 15# on pulley R/L 10 reps x 2 sets  Lat Pull down 15# on pulley R/L 20 reps   Wall push ups 10 reps x 2 sets   Supine KTC 30 sec hold R/L x 2 sets   LTR 10 reps - slow   Pt education: HEP modifications and benefits, indep progressions no more than 10% per week. What he is feeling vs when to modify exercises, goals, all questions/concerns addressed     Therapeutic Exercise: x 38 min  Knee screen   Supine ASLR 1# 20 reps R/L   Boat pose 30 sec hold  Discussion on sleeping position and pillow props- supine vs side-lying  Sidelying quad stretch 30 sec hold x 2 sets on the L   Squats- review for mechanics   Lateral walk black band on the R 2 steps x 5 reps x 2 sets; black and green band on the L   Black band row 20 reps   Counter push ups 10 reps     Education on inversion table and mechanics/postitioning Therapeutic Exercise: x 30 min  Supine KTC 30 sec hold R/L   DKTC 30 sec hold x 2 sets   Chantelle pose 30 sec hold- with RSB and LSB each   Seated flexion stretch 30 sec hold   Trial of indep traction- deferred   Sidelying quad stretch 30 sec hold x 2 sets on the L   Review of HEP and recommendations     Manual Therapy: x 15min    Lumbar PA accessory joint mobs - L1-L5: Grade 2, 4 x 10\" each     Thoracic PA accessory joint mobs - T1-T12: Grade 3, 2 x 10\" each     Soft tissue mobilization: lumbar paraspinals and low back musculature Manual Therapy: x 2 min  Roller stick to L quad 2 min  Manual Therapy: x 15min    Lumbar PA accessory joint mobs - L1-L5: Grade 2, 4 x 10\" each     Thoracic PA accessory joint mobs - T1-T12: Grade 3, 2 x 10\" each     Soft tissue mobilization: lumbar paraspinals and low back musculature Manual Therapy: x 2 min  PROM screen only Manual Therapy: x 4 min  Roller stick to L quad 2 min  ITB on the R 2 min  Manual Therapy: x30 min  Lumbar PA accessory joint mobs - L1-L5: Grade 2, 4 x 10\" each     STM to paraspinal minna on the R near PSIS 5 min      Thoracic PA accessory joint mobs - T1-T12: Grade 3, 2 x 10\" each    Roller stick to L quad 2 min  ITB on the R 2 min     Long axis traction 10 sec hold x 10 reps R/L each   Short axis traction 10 sec hold 10 reps on the R *relief of R side low back pain                        Assessment: Ed was educated on recommendations to focus on core strengthening, [place] LE strengthening, walking program and some opening stretches for chronic stenosis and R sided facet joint irritation present during therapy session. He got most relief for R side low back pain with short axis traction. R thigh tightness temporarily improved with stretches. Educated on avoiding squatting until relief of knee soreness. Verbalized understanding. HEP udpated formally.     Goals:   Short-Term Goals:  Patient will improve low back mobility and postural endurance to maintain proper posture with neutral lumbar spine and pelvic position while sitting to facilitate 4 hours of pain-free sitting. Timeframe: 3 weeks.  Long-Term Goals:  Patient will improve low back pain and core endurance to facilitate walking distances of 2 miles daily to facilitate household and community ambulation. Timeframe: 6 weeks.  Patient will improve LE mobility, strength, and single-leg stabilization to meet strength requirements for reciprocal stair navigation pattern with no asymmetries for ascending and descending 3 flights of stairs daily for community integration. Timeframe: 6-8 weeks.  Patient will demonstrates safe and proper lifting mechanics with intermittent lifting objects of 35lbs from a lower height to facilitate housework and lifting ADL's. Timeframe: 6-8 weeks.  Patient will improve Modified Oswestry Low Back Pain Disability Questionnaire score by 10 points or 10% to indicate a true change in improved function for ADL's and restoring PLF. Timeframe: 6-8 weeks.    Plan: Continue per plan of care for 1 additional therapy sessions then progress note. Plan for next  therapy session: assess knee pain- consider wall squat/press out, continue core strengthening     Charges: Therex x 2 manual: 2       Total Timed Treatment:  60min    Total Treatment Time: 60min

## 2024-02-26 PROBLEM — H26.9 CATARACT: Status: ACTIVE | Noted: 2024-02-26

## 2024-02-28 ENCOUNTER — OFFICE VISIT (OUTPATIENT)
Dept: PHYSICAL THERAPY | Age: 64
End: 2024-02-28
Attending: NURSE PRACTITIONER
Payer: COMMERCIAL

## 2024-02-28 PROCEDURE — 97140 MANUAL THERAPY 1/> REGIONS: CPT

## 2024-02-28 PROCEDURE — 97110 THERAPEUTIC EXERCISES: CPT

## 2024-02-28 NOTE — PROGRESS NOTES
Dx: S/P lumbar laminectomy (Z98.890), Lumbar radiculopathy, acute (M54.16)       L4-5  DOI/S: 11/6/23      Insurance: N/A  Insurance Limits: auth required  Authorized # of Visits:  10 visits auth to 3/31- requesting additional 2/28/2024   POC/Auth Expiration: TBA   Date of Last PN: 2/28/2024  (Visit #10)  Authorizing Physician/Provider: [employer]   Next MD visit: none      - no more   Precautions: None           Progress Summary  Pt has attended 10 visits in Physical Therapy. Recommending additional 6 therapy sessions at frequency of 1x/2 weeks.     Subjective: He states R knee is occasionally achy. He did a lot of standing with travel over the weekend. He was achy in low back after this. Other than his he did all his exercises yesterday without an issue.     Chief complaints Going up and down stairs and walking prolonged, standing prolonged, sitting prolonged minna with poor back support/position    Goals: to be able to walk the dog and improve standing tolerance.     Currently: walking tolerance - level surface with hiking boots- could do 1 mile - more his foot than any other symptoms.     Objective:   Oswestry Disability Index Score  Score: 22 % (11/21/2023  4:44 PM)    Oswestry Disability Index Score  Score: 14 % (2/28/2024 11:36 AM)    Patellar mobility: grossly hypermobile on the L compared to the R  Patellar compression: R *pain: L WFL   Palpation: ITB insertion *tender on the R      Knee screen: unremarkable with OP and PROM, no pain with MMT knee flexion/extension 5/5 BL     Lumbar flexion extension FT to floor no pain or changes in symptoms   Seated supported flexion *relief of R side low back pain during therapy session   Standing repeated extension *R side low back pain     SB: R/L WFL     Hip screen: unremarkable for pain- slightly hypermobile bilaterally     Core strength: 3/5     Pain/Symptom Presentation:  Pain at rest: 0/10  Pain at worst: 3/10  Location: central low back     HEP: Access Code:  [email or web address]  URL: [email or web address]  Date: 02/28/2024  Prepared by: Keeley Solano    Exercises  - Small Range Straight Leg Raise  - 1 x daily - 7 x weekly - 2 sets - 20 reps - 3# ankle weight  - Single Leg Stance  - 1 x daily - 7 x weekly - 3 sets - 1 reps - 30 hold  - Bridge with Heels on Swiss Ball  - 1 x daily - 7 x weekly - 2 sets - 10 reps  - Supine Hamstring Stretch with Strap  - 1 x daily - 7 x weekly - 3 sets - 1 reps - 30 hold  - Sidelying Hip Abduction  - 1 x daily - 7 x weekly - 3 sets - 10 reps - 3# ankle weight  - Curl Up with Reach  - 1 x daily - 7 x weekly - 2 sets - 10 reps    Treatment:  Date:  1/25/2024                TX#: 5/9  Date: 1/31/2024   Tx#: 6/9 2/8/2024   Tx#: 7/9 2/14/2024   Tx#: 8/9 2/21/2024   Tx#: 9/9 2/28/2024   Tx#; 10/15   Therapeutic Exercise: x 38 min  Upright bike level 2- 5 min  TRX squats 10 reps   Lat Pull down 15# on pulley R/L 20 reps   TRX row 15 reps   Monster walks - deferred   Lateral walk with blue band- deferred back to green band   Standing hip extension with ankle weight 5#- TCs for avoiding compensatory lumbar extension- PPT   Standing quad stretch 30 sec hold x 2 sets on the L   Seated boat pose with marches- 4 reps alt R/L   ASLR with PPT and 2# ankle weight R: 18 reps; L: 20 reps    Therapeutic Exercise: x 30 min  Upright bike level 2- 5 min  Side-lying clam shell blue band 20 reps R/L   Straight leg bridge on swiss ball- blue 10 reps x 2 sets   Lat Pull down 15# on pulley R/L 20 reps   TRX row 10 reps x 2 sets  Wall push ups 10 reps x 2 sets   Walking 30' x 4 laps   Sidelying quad stretch 30 sec hold x 2 sets on the L   Seated boat pose with marches- 4 reps alt R/L   *cues for PPT and core engagement t/o standing there ex  Pt education: HEP updates ; seated chair yoga and upper body strengthening benefits      Therapeutic Exercise: x 38 min  Supine straight leg bridge on ball 10 reps x 2 sets  Swiss ball knee and hip flexion 25 reps   Seated boat pose  with marches- 4 reps alt R/L   Sidelying quad stretch 30 sec hold x 2 sets on the L   TRX 15 reps   Lat Pull down 15# on pulley R/L 10 reps x 2 sets  Lat Pull down 15# on pulley R/L 20 reps   Wall push ups 10 reps x 2 sets   Supine KTC 30 sec hold R/L x 2 sets   LTR 10 reps - slow   Pt education: HEP modifications and benefits, indep progressions no more than 10% per week. What he is feeling vs when to modify exercises, goals, all questions/concerns addressed     Therapeutic Exercise: x 38 min  Knee screen   Supine ASLR 1# 20 reps R/L   Boat pose 30 sec hold  Discussion on sleeping position and pillow props- supine vs side-lying  Sidelying quad stretch 30 sec hold x 2 sets on the L   Squats- review for mechanics   Lateral walk black band on the R 2 steps x 5 reps x 2 sets; black and green band on the L   Black band row 20 reps   Counter push ups 10 reps     Education on inversion table and mechanics/postitioning Therapeutic Exercise: x 30 min  Supine KTC 30 sec hold R/L   DKTC 30 sec hold x 2 sets   Chantelle pose 30 sec hold- with RSB and LSB each   Seated flexion stretch 30 sec hold   Trial of indep traction- deferred   Sidelying quad stretch 30 sec hold x 2 sets on the L   Review of HEP and recommendations   Therapeutic Exercise: x 45 min  Supine KTC 30 sec hold R/L   DKTC 30 sec hold x 2 sets   ASLR with ankle weight 20#  Seated flexion stretch- used t/o therapy session   Standing upper trunk rotation with cane 10 reps R/L   Mariya row 12.5# 20 reps   Chest stretch in doorway 30 sec hold x 2 sets   Side-lying hip abduction 10 reps R/L 2#   Bridge straight leg on ball 20 reps     Reassessment 15 min        Manual Therapy: x 2 min  Roller stick to L quad 2 min  Manual Therapy: x 15min    Lumbar PA accessory joint mobs - L1-L5: Grade 2, 4 x 10\" each     Thoracic PA accessory joint mobs - T1-T12: Grade 3, 2 x 10\" each     Soft tissue mobilization: lumbar paraspinals and low back musculature Manual Therapy: x 2  min  PROM screen only Manual Therapy: x 4 min  Roller stick to L quad 2 min  ITB on the R 2 min  Manual Therapy: x30 min  Lumbar PA accessory joint mobs - L1-L5: Grade 2, 4 x 10\" each     STM to paraspinal minna on the R near PSIS 5 min     Thoracic PA accessory joint mobs - T1-T12: Grade 3, 2 x 10\" each    Roller stick to L quad 2 min  ITB on the R 2 min     Long axis traction 10 sec hold x 10 reps R/L each   Short axis traction 10 sec hold 10 reps on the R *relief of R side low back pain    Manual Therapy: x10 min  Roller stick to L quad 2 min  ITB on the R 2 min     Long axis traction 10 sec hold x 10 reps R/L each                          Assessment: Pt demonstrates improved MARY JO score, improved lumbar flexibility. Hip mobility unremarkable. Overall strength improving. Still limited core strength and endurance increasing the likelihood and risk for re injury. Pt has limited standing and walking tolerance. For this reason I believe Ed would benefit from continued skilled PT intervention to address these impairments and function limitations. HEP updated and in place.     Goals: Progressing toward goals 2/28/2024   Short-Term Goals:  Patient will improve low back mobility and postural endurance to maintain proper posture with neutral lumbar spine and pelvic position while sitting to facilitate 4 hours of pain-free sitting. Timeframe: 3 weeks.- MET- in specific chair  Long-Term Goals:  Patient will improve low back pain and core endurance to facilitate walking distances of 2 miles daily to facilitate household and community ambulation. Timeframe: 6 weeks.- NOT MET  Patient will improve LE mobility, strength, and single-leg stabilization to meet strength requirements for reciprocal stair navigation pattern with no asymmetries for ascending and descending 3 flights of stairs daily for community integration. Timeframe: 6-8 weeks.- PROGRESSING  Patient will demonstrates safe and proper lifting mechanics with intermittent  lifting objects of 35lbs from a lower height to facilitate housework and lifting ADL's. Timeframe: 6-8 weeks.- PROGRESSING  Patient will improve Modified Oswestry Low Back Pain Disability Questionnaire score by 10 points or 10% to indicate a true change in improved function for ADL's and restoring PLF. Timeframe: 6-8 weeks- 8 % improvement     Plan: Continue per plan of care for additional 6 therapy sessions at frequency of 1x/2 weeks.   Patient/Family/Caregiver was advised of these findings, precautions, and treatment options and has agreed to actively participate in planning and for this course of care.    Thank you for your referral. If you have any questions, please contact me at Dept: [phone]    Sincerely,  Electronically signed by therapist: Keeley Solano PT     Charges: Therex x 3 :  manual: 1      Total Timed Treatment:  55min    Total Treatment Time: 55min

## 2024-03-04 DIAGNOSIS — E11.42 DIABETIC POLYNEUROPATHY ASSOCIATED WITH TYPE 2 DIABETES MELLITUS (HCC): ICD-10-CM

## 2024-03-04 DIAGNOSIS — E11.40 TYPE 2 DIABETES MELLITUS WITH DIABETIC NEUROPATHY, WITHOUT LONG-TERM CURRENT USE OF INSULIN (HCC): ICD-10-CM

## 2024-03-06 ENCOUNTER — OFFICE VISIT (OUTPATIENT)
Dept: PHYSICAL THERAPY | Age: 64
End: 2024-03-06
Attending: NURSE PRACTITIONER
Payer: COMMERCIAL

## 2024-03-06 PROCEDURE — 97140 MANUAL THERAPY 1/> REGIONS: CPT

## 2024-03-06 PROCEDURE — 97110 THERAPEUTIC EXERCISES: CPT

## 2024-03-19 ENCOUNTER — OFFICE VISIT (OUTPATIENT)
Dept: PHYSICAL THERAPY | Age: 64
End: 2024-03-19
Attending: NURSE PRACTITIONER
Payer: COMMERCIAL

## 2024-03-19 PROCEDURE — 97140 MANUAL THERAPY 1/> REGIONS: CPT

## 2024-03-19 PROCEDURE — 97112 NEUROMUSCULAR REEDUCATION: CPT

## 2024-03-19 PROCEDURE — 97110 THERAPEUTIC EXERCISES: CPT

## 2024-03-19 NOTE — PROGRESS NOTES
Dx: S/P lumbar laminectomy (Z98.890), Lumbar radiculopathy, acute (M54.16)       L4-5  DOI/S: 11/6/23      Insurance: N/A  Insurance Limits: auth required  Authorized # of Visits:  10 visits auth to 3/31- requesting additional 2/28/2024   POC/Auth Expiration: TBA   Date of Last PN: 2/28/2024  (Visit #10)  Authorizing Physician/Provider: [employer]   Next MD visit: none      - no more   Precautions: None              Subjective: He states waking up with some soreness in the back - does stretches and it helps. He states he has been doing well with thigh with exception of tightness more so the last 2 days. Additionally notes his neuropathy seems worsening cause some balance complaints- specifically LOB posteriorly when looking up and closing eyes.     Chief complaints Going up and down stairs and walking prolonged, standing prolonged, sitting prolonged minna with poor back support/position    Goals: to be able to walk the dog and improve standing tolerance. To be able [place]     Currently: Spent 1.5 hours in [employer]. - felt ok after this        Objective:   Romberg on foam: with head turns :20 sec   Romberg EC on flat surface: 20 sec    Oswestry Disability Index Score  Score: 14 % (2/28/2024 11:36 AM)    Lumbar flexion extension FT to floor no pain or changes in symptoms   Standing repeated extension  WFL (improved from painful at last therapy session)  SB: R/L WFL     Hip screen: unremarkable for pain- slightly hypermobile bilaterally   Hip flexor length: tightness bilaterally minna on the L with cues for PPT     Core strength: 3/5     Pain/Symptom Presentation:  Pain at rest: 0/10  Pain at worst: 3/10  Location: central low back     HEP: Access Code: [email or web address]  URL: [email or web address]  Date: 02/28/2024  Prepared by: Keeley Solano    Exercises  - Small Range Straight Leg Raise  - 1 x daily - 7 x weekly - 2 sets - 20 reps - 3# ankle weight  - Single Leg Stance  - 1 x daily -  7 x weekly - 3 sets - 1 reps - 30 hold  - Bridge with Heels on Swiss Ball  - 1 x daily - 7 x weekly - 2 sets - 10 reps  - Supine Hamstring Stretch with Strap  - 1 x daily - 7 x weekly - 3 sets - 1 reps - 30 hold  - Sidelying Hip Abduction  - 1 x daily - 7 x weekly - 3 sets - 10 reps - 3# ankle weight  - Curl Up with Reach  - 1 x daily - 7 x weekly - 2 sets - 10 reps  3/6/2024 hip flexor stretch, PPT with standing hip abduction   3/19/2024  4# in ankle weights, progressed to black band for clamshells, adding romberg on foam with head turns, adding romberg EC flat surface    Treatment:  2/8/2024   Tx#: 7/9 2/14/2024   Tx#: 8/9 2/21/2024   Tx#: 9/9 2/28/2024   Tx#; 10/15 3/6/2024   Tx#; 11/15  3/19/2024   Tx:   Therapeutic Exercise: x 38 min  Supine straight leg bridge on ball 10 reps x 2 sets  Swiss ball knee and hip flexion 25 reps   Seated boat pose with marches- 4 reps alt R/L   Sidelying quad stretch 30 sec hold x 2 sets on the L   TRX 15 reps   Lat Pull down 15# on pulley R/L 10 reps x 2 sets  Lat Pull down 15# on pulley R/L 20 reps   Wall push ups 10 reps x 2 sets   Supine KTC 30 sec hold R/L x 2 sets   LTR 10 reps - slow   Pt education: HEP modifications and benefits, indep progressions no more than 10% per week. What he is feeling vs when to modify exercises, goals, all questions/concerns addressed     Therapeutic Exercise: x 38 min  Knee screen   Supine ASLR 1# 20 reps R/L   Boat pose 30 sec hold  Discussion on sleeping position and pillow props- supine vs side-lying  Sidelying quad stretch 30 sec hold x 2 sets on the L   Squats- review for mechanics   Lateral walk black band on the R 2 steps x 5 reps x 2 sets; black and green band on the L   Black band row 20 reps   Counter push ups 10 reps     Education on inversion table and mechanics/postitioning Therapeutic Exercise: x 30 min  Supine KTC 30 sec hold R/L   DKTC 30 sec hold x 2 sets   Chantelle pose 30 sec hold- with RSB and LSB each   Seated flexion stretch  30 sec hold   Trial of indep traction- deferred   Sidelying quad stretch 30 sec hold x 2 sets on the L   Review of HEP and recommendations   Therapeutic Exercise: x 45 min  Supine KTC 30 sec hold R/L   DKTC 30 sec hold x 2 sets   ASLR with ankle weight 20#  Seated flexion stretch- used t/o therapy session   Standing upper trunk rotation with cane 10 reps R/L   Mariya row 12.5# 20 reps   Chest stretch in doorway 30 sec hold x 2 sets   Side-lying hip abduction 10 reps R/L 2#   Bridge straight leg on ball 20 reps     Reassessment 15 min      Therapeutic Exercise: x 35 min  Supine KTC 30 sec hold R/L   LTR 10 reps R/L   Knee and hip flexion with swiss ball 20 reps   Straight leg bridge on ball 10 reps   TRX row with PPT 10 reps x 2 sets     Chest stretch in doorway 30 sec hold x 2 sets   UTR with dowel 10 reps   Lat pull down 20# pulley 10 reps x 2 sets     PPT with hip flexor stretch 30 sec hold x 2 sets   PPT with hip abduction 10 reps R/l   PPT with hip extension 10 reps R/L  Therapeutic Exercise: x 25 min  Supine KTC 30 sec hold R/L   LTR 10 reps R/L   TRX row PPT 10 reps x 2 sets   Pulley lat pull down 22# 15 reps   Standing 3 way hip with PPT and green band at ankles- encouraged for less UE assist   Clam shell black band 20 reps R/L c 2 sets   DLHR 10 reps; SLHR with visual cues 10 reps R/L     Neuro martell: 8 min   Romberg on foam 30 sec hold ; with head turns R/L 10 reps x 2 sets  Lateral walk on foam beam 10' x 4 reps   Romberg eyes closed 30 sec hold x 2 sets     Pt education; HEP updates and benefits- reducing fall risk, impairments   Manual Therapy: x 2 min  PROM screen only Manual Therapy: x 4 min  Roller stick to L quad 2 min  ITB on the R 2 min  Manual Therapy: x30 min  Lumbar PA accessory joint mobs - L1-L5: Grade 2, 4 x 10\" each     STM to paraspinal minna on the R near PSIS 5 min     Thoracic PA accessory joint mobs - T1-T12: Grade 3, 2 x 10\" each    Roller stick to L quad 2 min  ITB on the R 2 min      Long axis traction 10 sec hold x 10 reps R/L each   Short axis traction 10 sec hold 10 reps on the R *relief of R side low back pain    Manual Therapy: x10 min  Roller stick to L quad 2 min  ITB on the R 2 min     Long axis traction 10 sec hold x 10 reps R/L each      Manual Therapy: x10 min  Roller stick to L quad 2 min  ITB on the R 2 min     Long axis traction 10 sec hold x 10 reps R/L each    Manual Therapy: x10 min  Roller stick to L quad 2 min  ITB on the R 2 min     Long axis traction 10 sec hold x 10 reps R/L each                      Assessment: Pt demonstrates increased LOB noted during narrow NE on compliant surfaces with movements inside NE. Additionally, EC static balance impairments. These are secondary to impairments in proprioception and sensation 2/2 to chronic neuropathy. Pt was educated on vestibular balance exercises to attempt for compensatory strategies and reducing fall risk for goals to hike on uneven surfaces. Pt continues to improve core strength and stability to improve standing and walking tolerance. HEP updated.     Goals: Progressing toward goals 3/6/2024    Short-Term Goals:  Patient will improve low back mobility and postural endurance to maintain proper posture with neutral lumbar spine and pelvic position while sitting to facilitate 4 hours of pain-free sitting. Timeframe: 3 weeks.- MET- in specific chair  Long-Term Goals:  Patient will improve low back pain and core endurance to facilitate walking distances of 2 miles daily to facilitate household and community ambulation. Timeframe: 6 weeks.- NOT MET  Patient will improve LE mobility, strength, and single-leg stabilization to meet strength requirements for reciprocal stair navigation pattern with no asymmetries for ascending and descending 3 flights of stairs daily for community integration. Timeframe: 6-8 weeks.- PROGRESSING  Patient will demonstrates safe and proper lifting mechanics with intermittent lifting objects of  35lbs from a lower height to facilitate housework and lifting ADL's. Timeframe: 6-8 weeks.- PROGRESSING  Patient will improve Modified Oswestry Low Back Pain Disability Questionnaire score by 10 points or 10% to indicate a true change in improved function for ADL's and restoring PLF. Timeframe: 6-8 weeks- 8 % improvement     Plan: Continue per plan of care at frequency of 1x/2 weeks. Then tapering toward discharge.          Charges: Therex x 2: neuro martell: 1 manual: 1      Total Timed Treatment:  43 min    Total Treatment Time: 43 min

## 2024-03-21 ENCOUNTER — PATIENT MESSAGE (OUTPATIENT)
Dept: FAMILY MEDICINE CLINIC | Facility: CLINIC | Age: 64
End: 2024-03-21

## 2024-03-21 DIAGNOSIS — H93.11 TINNITUS OF RIGHT EAR: ICD-10-CM

## 2024-03-21 DIAGNOSIS — E11.40 TYPE 2 DIABETES MELLITUS WITH DIABETIC NEUROPATHY, WITHOUT LONG-TERM CURRENT USE OF INSULIN (HCC): Primary | ICD-10-CM

## 2024-03-21 NOTE — TELEPHONE ENCOUNTER
Please advise.  Last office visit 10/23.    Referrals pended for approval if appropriate.  Provider not specified for podiatry.

## 2024-03-21 NOTE — TELEPHONE ENCOUNTER
Please place referral for podiatry in terms of the possible ear infection he would need to be evaluated I will be in urgent care or walk-in clinic before following up with ENT

## 2024-03-21 NOTE — TELEPHONE ENCOUNTER
From: Silvestre Riley Jr.  To: Inderjit Le  Sent: 3/21/2024 10:38 AM CDT  Subject: Podiatrist referral, ENT referral    Dr. Le, It has been a while since I visited a Podiatrist for a diabetic foot exam. Could you recommend a good person for me to see? Also, I have been hearing a clicking noise in my right ear reminiscent of an ear infection. I have seen Dr. Lopez in the past but I am not sure if he is part of the new [facility]. Maybe you could recommend someone else as I am afraid my insurance may not cross over to [employer]. Thank you so much.

## 2024-03-27 DIAGNOSIS — G89.18 POST-OP PAIN: ICD-10-CM

## 2024-03-28 ENCOUNTER — TELEPHONE (OUTPATIENT)
Dept: FAMILY MEDICINE CLINIC | Facility: CLINIC | Age: 64
End: 2024-03-28

## 2024-03-28 RX ORDER — TRAMADOL HYDROCHLORIDE 50 MG/1
50 TABLET ORAL EVERY 6 HOURS PRN
Qty: 40 TABLET | Refills: 0 | Status: SHIPPED | OUTPATIENT
Start: 2024-03-28

## 2024-03-28 NOTE — TELEPHONE ENCOUNTER
Msg from pt and APN noted.    [facility] response sent to pt to inform.    Call placed to PCP to transfer rx and get more info.    Nothing further needed at this time.

## 2024-03-28 NOTE — TELEPHONE ENCOUNTER
Veronica called from Dr. Basurto's office. They would like to know if you can assume refilling the Tramadol for the pt.? They will no longer be seeing the pt. As they said they do not really need any follow up with the pt. I informed them I would send you a message as you are currently out of the office and the pt has not been seen since 10/17/2023 and will more than likely need an appt.

## 2024-03-28 NOTE — TELEPHONE ENCOUNTER
Medication: Tramadol 50 mg      Date of last refill: 12/19/2023 (#40 tablets /0 refills)  [place]     Last office visit: 12/19/2023  Due back to clinic per last office note:  PRN  Date next office visit scheduled:    Future Appointments   Date Time Provider Department Center   4/10/2024  2:45 PM Keeley Solano, MAXIMO SNPT [facility]   4/17/2024 10:45 AM Keeley Solano PT SNPT [facility]   4/19/2024  2:30 PM Elie Zazueta MD [facility]   4/30/2024 10:00 AM [facility]   5/20/2024  1:00 PM Gladys Plata APRN [facility]           Last OV note recommendation:  \"ASSESSMENT:  S/p Redo L4, 5 laminectomies      PLAN:  Ok to lift bending, lifting, twisting restrictions  2.   Tramadol refilled to Mail order pharmacy  3.   F/u PRN\"

## 2024-04-01 NOTE — TELEPHONE ENCOUNTER
Per the message from the office of Dr. Venegas. They will no longer be seeing the pt but he will still need the pain medication for ongoing left thigh pain. He rarely needs it but on occasion the left thigh pain flares up.  He had surgery 11/06/23 ( lumbar microdisectomy) Pt no longer needs follow ups. They were wanting to transfer this prescription to his PCP.

## 2024-04-04 ENCOUNTER — APPOINTMENT (OUTPATIENT)
Dept: PHYSICAL THERAPY | Age: 64
End: 2024-04-04
Attending: NURSE PRACTITIONER
Payer: COMMERCIAL

## 2024-04-10 ENCOUNTER — OFFICE VISIT (OUTPATIENT)
Dept: PHYSICAL THERAPY | Age: 64
End: 2024-04-10
Attending: NURSE PRACTITIONER
Payer: COMMERCIAL

## 2024-04-10 PROCEDURE — 97110 THERAPEUTIC EXERCISES: CPT

## 2024-04-10 PROCEDURE — 97140 MANUAL THERAPY 1/> REGIONS: CPT

## 2024-04-10 NOTE — PROGRESS NOTES
Dx: S/P lumbar laminectomy (Z98.890), Lumbar radiculopathy, acute (M54.16)       L4-5  DOI/S: 11/6/23      Insurance: N/A  Insurance Limits: auth required  Authorized # of Visits:  10 visits auth to 3/31- requesting additional 2/28/2024   POC/Auth Expiration: TBA   Date of Last PN: 2/28/2024  (Visit #10)  Authorizing Physician/Provider: [place]   Next MD visit: none      - no more   Precautions: None              Subjective: He states last 4 days causes low back wrapping to abdomen causing difficulty moving. Sometimes he feels ok and sometimes it feels like a giant clamp is around his abdomen. Feeling bloated. He feels very weak. Regular bowel movements. He states a couple times he lost is balance and turning quickly felt a jolt but this feels unrelated and indep of any back pain. He does not recall any issues with HEP or his low back pain. He does have follow up with physician next week for colonoscopy and was put on more fiber supplements which he feels is related to new onset pain.   Pain/Symptom Presentation:  Pain at rest: 0/10- low back   Pain at worst: 3/10  Location: central low back     Previous session:   Chief complaints Going up and down stairs and walking prolonged, standing prolonged, sitting prolonged minna with poor back support/position    Goals: to be able to walk the dog and improve standing tolerance. To be able [place] go [place]     Currently: Spent 1.5 hours in [employer]. - felt ok after this      Objective:   Thoracic AROM: flexion: *pain wrapping around     Extension: *slight pain   Prone press ups *relief   Palpation: abdomen: upper R quadrant tenderness- relief with cues to engage core   P-A assessory mobility thoracic spine level T10 centrally *tenderness with pain wrapping around bilaterally     Previous therapy session:   Lumbar flexion extension FT to floor no pain or changes in symptoms   Standing repeated extension  WFL (improved from painful at last therapy  session)  SB: R/L WFL     Hip screen: unremarkable for pain- slightly hypermobile bilaterally   Hip flexor length: tightness bilaterally minna on the L with cues for PPT     Core strength: 3/5         HEP: Access Code: [email or web address]  URL: [email or web address]  Date: 02/28/2024  Prepared by: Keeley Solano    Exercises  - Small Range Straight Leg Raise  - 1 x daily - 7 x weekly - 2 sets - 20 reps - 3# ankle weight  - Single Leg Stance  - 1 x daily - 7 x weekly - 3 sets - 1 reps - 30 hold  - Bridge with Heels on Swiss Ball  - 1 x daily - 7 x weekly - 2 sets - 10 reps  - Supine Hamstring Stretch with Strap  - 1 x daily - 7 x weekly - 3 sets - 1 reps - 30 hold  - Sidelying Hip Abduction  - 1 x daily - 7 x weekly - 3 sets - 10 reps - 3# ankle weight  - Curl Up with Reach  - 1 x daily - 7 x weekly - 2 sets - 10 reps  3/6/2024 hip flexor stretch, PPT with standing hip abduction   3/19/2024  4# in ankle weights, progressed to black band for clamshells, adding romberg on foam with head turns, adding romberg EC flat surface  4/10/2024 up to 5# ankle weights   4/10/2024 prone press ups as tolerated   Treatment:  2/8/2024   Tx#: 7/9 2/14/2024   Tx#: 8/9 2/21/2024   Tx#: 9/9 2/28/2024   Tx#; 10/15 3/6/2024   Tx#; 11/15  3/19/2024   Tx:12/15 4/10/2024   Tx#' 13/15   Therapeutic Exercise: x 38 min  Supine straight leg bridge on ball 10 reps x 2 sets  Swiss ball knee and hip flexion 25 reps   Seated boat pose with marches- 4 reps alt R/L   Sidelying quad stretch 30 sec hold x 2 sets on the L   TRX 15 reps   Lat Pull down 15# on pulley R/L 10 reps x 2 sets  Lat Pull down 15# on pulley R/L 20 reps   Wall push ups 10 reps x 2 sets   Supine KTC 30 sec hold R/L x 2 sets   LTR 10 reps - slow   Pt education: HEP modifications and benefits, indep progressions no more than 10% per week. What he is feeling vs when to modify exercises, goals, all questions/concerns addressed     Therapeutic Exercise: x 38 min  Knee screen   Supine ASLR 1# 20  reps R/L   Boat pose 30 sec hold  Discussion on sleeping position and pillow props- supine vs side-lying  Sidelying quad stretch 30 sec hold x 2 sets on the L   Squats- review for mechanics   Lateral walk black band on the R 2 steps x 5 reps x 2 sets; black and green band on the L   Black band row 20 reps   Counter push ups 10 reps     Education on inversion table and mechanics/postitioning Therapeutic Exercise: x 30 min  Supine KTC 30 sec hold R/L   DKTC 30 sec hold x 2 sets   Chantelle pose 30 sec hold- with RSB and LSB each   Seated flexion stretch 30 sec hold   Trial of indep traction- deferred   Sidelying quad stretch 30 sec hold x 2 sets on the L   Review of HEP and recommendations   Therapeutic Exercise: x 45 min  Supine KTC 30 sec hold R/L   DKTC 30 sec hold x 2 sets   ASLR with ankle weight 20#  Seated flexion stretch- used t/o therapy session   Standing upper trunk rotation with cane 10 reps R/L   Mariya row 12.5# 20 reps   Chest stretch in doorway 30 sec hold x 2 sets   Side-lying hip abduction 10 reps R/L 2#   Bridge straight leg on ball 20 reps     Reassessment 15 min      Therapeutic Exercise: x 35 min  Supine KTC 30 sec hold R/L   LTR 10 reps R/L   Knee and hip flexion with swiss ball 20 reps   Straight leg bridge on ball 10 reps   TRX row with PPT 10 reps x 2 sets     Chest stretch in doorway 30 sec hold x 2 sets   UTR with dowel 10 reps   Lat pull down 20# pulley 10 reps x 2 sets     PPT with hip flexor stretch 30 sec hold x 2 sets   PPT with hip abduction 10 reps R/l   PPT with hip extension 10 reps R/L  Therapeutic Exercise: x 25 min  Supine KTC 30 sec hold R/L   LTR 10 reps R/L   TRX row PPT 10 reps x 2 sets   Pulley lat pull down 22# 15 reps   Standing 3 way hip with PPT and green band at ankles- encouraged for less UE assist   Clam shell black band 20 reps R/L c 2 sets   DLHR 10 reps; SLHR with visual cues 10 reps R/L     Neuro martell: 8 min   Romberg on foam 30 sec hold ; with head turns R/L 10  reps x 2 sets  Lateral walk on foam beam 10' x 4 reps   Romberg eyes closed 30 sec hold x 2 sets     Pt education; HEP updates and benefits- reducing fall risk, impairments Therapeutic Exercise: x 25 min  Supine KTC 30 sec hold R/L   LTR 10 reps R/L   Prone on elbows 10 sec hold 10 reps;   Prone press ups 10 reps   Review of HEP and recommendations     Objective exam above 10 min      Manual Therapy: x 2 min  PROM screen only Manual Therapy: x 4 min  Roller stick to L quad 2 min  ITB on the R 2 min  Manual Therapy: x30 min  Lumbar PA accessory joint mobs - L1-L5: Grade 2, 4 x 10\" each     STM to paraspinal minna on the R near PSIS 5 min     Thoracic PA accessory joint mobs - T1-T12: Grade 3, 2 x 10\" each    Roller stick to L quad 2 min  ITB on the R 2 min     Long axis traction 10 sec hold x 10 reps R/L each   Short axis traction 10 sec hold 10 reps on the R *relief of R side low back pain    Manual Therapy: x10 min  Roller stick to L quad 2 min  ITB on the R 2 min     Long axis traction 10 sec hold x 10 reps R/L each      Manual Therapy: x10 min  Roller stick to L quad 2 min  ITB on the R 2 min     Long axis traction 10 sec hold x 10 reps R/L each    Manual Therapy: x10 min  Roller stick to L quad 2 min  ITB on the R 2 min     Long axis traction 10 sec hold x 10 reps R/L each  Manual Therapy: x20 min  Prone Skin lock gr III-IV 10 reps x 5 sets aimed above and below- eventually working toward T10 tenderness- T5- T12 range    PPIVM rotation t/o thoracic spine gr III R/L each     Long axis traction 10 sec hold x 10 reps R/L each                        Assessment: Pt arrives with new onset abdominal pain- during examination he did have some tenderness in R upper quadrant. He was educated to notify doctor if this persists. Verbalized understanding. Additionally he did have some mechanical thoracic spine pain reproduced with P-A over T10 with wrapping pain around ribcage. He does have pain upon inspiration. Relief provided  with manual intervention and extension biased stretches. At this time pt will monitor symptoms and follow up with physician as appropriate. Low back pain was not limiting during therapy session - HEP being tolerated well.     Goals: Progressing toward goals 4/10/2024     Short-Term Goals:  Patient will improve low back mobility and postural endurance to maintain proper posture with neutral lumbar spine and pelvic position while sitting to facilitate 4 hours of pain-free sitting. Timeframe: 3 weeks.- MET- in specific chair  Long-Term Goals:  Patient will improve low back pain and core endurance to facilitate walking distances of 2 miles daily to facilitate household and community ambulation. Timeframe: 6 weeks.- NOT MET  Patient will improve LE mobility, strength, and single-leg stabilization to meet strength requirements for reciprocal stair navigation pattern with no asymmetries for ascending and descending 3 flights of stairs daily for community integration. Timeframe: 6-8 weeks.- PROGRESSING  Patient will demonstrates safe and proper lifting mechanics with intermittent lifting objects of 35lbs from a lower height to facilitate housework and lifting ADL's. Timeframe: 6-8 weeks.- PROGRESSING  Patient will improve Modified Oswestry Low Back Pain Disability Questionnaire score by 10 points or 10% to indicate a true change in improved function for ADL's and restoring PLF. Timeframe: 6-8 weeks- 8 % improvement     Plan: Continue per plan of care at frequency of 1x/2 weeks. Then tapering toward discharge.          Charges: Therex x 2: manual: 1      Total Timed Treatment:  45 min    Total Treatment Time: 45 min

## 2024-04-17 ENCOUNTER — APPOINTMENT (OUTPATIENT)
Dept: PHYSICAL THERAPY | Age: 64
End: 2024-04-17
Attending: NURSE PRACTITIONER
Payer: COMMERCIAL

## 2024-04-19 PROBLEM — Z86.0101 HISTORY OF ADENOMATOUS POLYP OF COLON: Status: ACTIVE | Noted: 2024-04-19

## 2024-04-19 PROBLEM — Z86.010 HISTORY OF ADENOMATOUS POLYP OF COLON: Status: ACTIVE | Noted: 2024-04-19

## 2024-04-22 PROCEDURE — 88305 TISSUE EXAM BY PATHOLOGIST: CPT | Performed by: INTERNAL MEDICINE

## 2024-04-25 DIAGNOSIS — E11.40 TYPE 2 DIABETES MELLITUS WITH DIABETIC NEUROPATHY, WITHOUT LONG-TERM CURRENT USE OF INSULIN (HCC): Primary | ICD-10-CM

## 2024-04-25 DIAGNOSIS — E78.00 PURE HYPERCHOLESTEROLEMIA: ICD-10-CM

## 2024-04-25 NOTE — TELEPHONE ENCOUNTER
Requested Renewals     atorvastatin 10 MG Oral Tab         Sig: Take 1 tablet (10 mg total) by mouth nightly.    Disp: 90 tablet    Refills: 1    Start: 4/25/2024    Class: Normal    Non-formulary    Last ordered: 7 months ago (9/23/2023) by Inderjit Le MD    Cholesterol Medication Protocol [id number]/25/2024 11:41 AM   Protocol Details ALT < 80    ALT resulted within past year    Lipid panel within past 12 months    In person appointment or virtual visit in the past 12 mos or appointment in next 3 mos         Lov 10/17/23  NOV due 11/17/24  No future appointments  Last labs 10/25/23

## 2024-04-26 ENCOUNTER — TELEPHONE (OUTPATIENT)
Dept: FAMILY MEDICINE CLINIC | Facility: CLINIC | Age: 64
End: 2024-04-26

## 2024-04-26 ENCOUNTER — HOSPITAL ENCOUNTER (OUTPATIENT)
Age: 64
Discharge: HOME | End: 2024-04-26
Attending: EMERGENCY MEDICINE
Payer: COMMERCIAL

## 2024-04-26 VITALS
HEART RATE: 86 BPM | TEMPERATURE: 98 F | BODY MASS INDEX: 32 KG/M2 | RESPIRATION RATE: 18 BRPM | OXYGEN SATURATION: 97 % | WEIGHT: 260 LBS | DIASTOLIC BLOOD PRESSURE: 74 MMHG | SYSTOLIC BLOOD PRESSURE: 158 MMHG

## 2024-04-26 DIAGNOSIS — H92.01 RIGHT EAR PAIN: Primary | ICD-10-CM

## 2024-04-26 PROCEDURE — 99213 OFFICE O/P EST LOW 20 MIN: CPT

## 2024-04-26 RX ORDER — ATORVASTATIN CALCIUM 10 MG/1
10 TABLET, FILM COATED ORAL NIGHTLY
Qty: 90 TABLET | Refills: 0 | Status: SHIPPED | OUTPATIENT
Start: 2024-04-26

## 2024-04-26 RX ORDER — LEVOFLOXACIN 500 MG/1
500 TABLET, FILM COATED ORAL DAILY
Qty: 7 TABLET | Refills: 0 | Status: SHIPPED | OUTPATIENT
Start: 2024-04-26 | End: 2024-05-03

## 2024-04-26 RX ORDER — ATORVASTATIN CALCIUM 10 MG/1
10 TABLET, FILM COATED ORAL NIGHTLY
Qty: 30 TABLET | Refills: 0 | Status: SHIPPED | OUTPATIENT
Start: 2024-04-26

## 2024-04-26 NOTE — DISCHARGE INSTRUCTIONS
Levofloxacin once a day for 7 days.  This is more to prevent development of infection in the middle or external ear.  Up with her primary care physician referral to ENT if symptoms do not improve.

## 2024-04-26 NOTE — TELEPHONE ENCOUNTER
When sending mychart to book appt for RX refill pt sent this msg in response:      Christina, Thanks so much. I will get the labs done early next week make and appointment in My Chart. Also, I had messaged Dr. Le with a request for a referral to an ENT for what turns out to be an ear infection and wax issue. He said I should go to Urgent Care first. I was just at urgent care for my ear this AM. They prescribed an anti-biotic and suggested I see and ENT. Can I get a referral to see Dr. Lopez. I have seen him in the past and feel comfortable with his staff. Please advise and thank Dr. Le for me.     I pended referral with DX used at  if you agree?    Please approve if appropriate. Thanks.

## 2024-04-26 NOTE — TELEPHONE ENCOUNTER
Noted. No BW is in system    Also RX was for #30 to mail order--needs 90    RX sent, labs in.     Mcm sent to pt advising to have labs/book appt.

## 2024-04-26 NOTE — ED PROVIDER NOTES
Patient Seen in: [facility]      History     Chief Complaint   Patient presents with    Ear Problem Pain     Stated Complaint: ear issue    Subjective:   HPI    Patient is 63-year-old male with a history of non-insulin-dependent diabetes presenting for evaluation of some bilateral ear discomfort, right much more than left.  He states he thinks he has had some seasonal allergies and has had some congestion and fullness in both ears but for about a week, maybe a week and a half he has had a clicking sensation and more pain in the right ear.  No fevers.  No nasal congestion or illness.  No discharge or drainage.    Objective:   Past Medical History:    Arthritis    Minor joint pains mostly in the fingers    Back problem    Belching    Occasionally    Bloating    Occasionally    Constipation    Occasionally    Diabetic peripheral neuropathy (HCC)    Esophageal reflux    Flatulence/gas pain/belching    Occasionally    Frequent urination    Frequent use of laxatives    Occasionally    Hearing loss    Age related    Heartburn    Occasionally    High blood pressure    High cholesterol    Indigestion    Occasionally    Intractable vomiting with nausea, unspecified vomiting type    Irregular bowel habits    Frequently    Neuropathy    MID-SHIN AND DOWN    Osteoarthritis    GENERALIZED    Pain with bowel movements    Occasionally    Sleep apnea    CPAP    Type II or unspecified type diabetes mellitus without mention of complication, not stated as uncontrolled    Unspecified essential hypertension    Visual impairment    Wears glasses    Readers    Weight gain    Lock down              Past Surgical History:   Procedure Laterality Date    Appendectomy      Back surgery  6-7-2021    Colonoscopy      Colonoscopy  2015    Other      Right big toe partially amputated    Other surgical history  11/06/2023    REDO LUMBAR 4, LUMBAR 5 LAMINECTOMIES    Sigmoidoscopy,diagnostic  1989    Spine surgery procedure unlisted           Vasectomy  18 years ago                Social History     Socioeconomic History    Marital status:    Tobacco Use    Smoking status: Former     Current packs/day: 0.00     Average packs/day: 1 pack/day for 38.0 years (38.0 ttl pk-yrs)     Types: Cigarettes     Start date: 1981     Quit date: 2019     Years since quittin.6    Smokeless tobacco: Never    Tobacco comments:     Quit in 2019   Vaping Use    Vaping status: Never Used   Substance and Sexual Activity    Alcohol use: Yes     Alcohol/week: 4.0 standard drinks of alcohol     Types: 4 Cans of beer per week     Comment: rarely    Drug use: No   Other Topics Concern    Caffeine Concern Yes     Comment: 2 cup coffee per day    Exercise No     Comment: on hold               Review of Systems    Positive for stated complaint: ear issue  Other systems are as noted in HPI.  Constitutional and vital signs reviewed.      All other systems reviewed and negative except as noted above.    Physical Exam     ED Triage Vitals [24 0912]   /74   Pulse 86   Resp 18   Temp 97.6 °F (36.4 °C)   Temp src Oral   SpO2 97 %   O2 Device None (Room air)       Current:/74   Pulse 86   Temp 97.6 °F (36.4 °C) (Oral)   Resp 18   Wt 117.9 kg   SpO2 97%   BMI 31.65 kg/m²         Physical Exam  Vitals and nursing note reviewed.   Constitutional:       Appearance: He is well-developed.   HENT:      Head: Normocephalic and atraumatic.      Ears:      Comments: Left TM is clear.  Right has a little bit of a cerumen impaction in the inferior posterior third.  Visualized TM may be a little bit erythema but fairly minimal.  There is no canal swelling or discharge.  Eyes:      Conjunctiva/sclera: Conjunctivae normal.      Pupils: Pupils are equal, round, and reactive to light.   Cardiovascular:      Rate and Rhythm: Normal rate and regular rhythm.      Heart sounds: Normal heart sounds.   Pulmonary:      Effort: Pulmonary effort is normal.       Breath sounds: Normal breath sounds.   Abdominal:      General: Bowel sounds are normal.      Palpations: Abdomen is soft.   Musculoskeletal:         General: Normal range of motion.   Skin:     General: Skin is warm and dry.   Neurological:      Mental Status: He is alert and oriented to person, place, and time.               ED Course   Labs Reviewed - No data to display                   MDM      63-year-old male diabetic presenting for evaluation of right ear pain and fullness.  Details in the HPI.  There is a little bit of a cerumen impaction but nothing complete.  May be the cause of his symptoms, there is no definite otitis media or otitis externa on exam but in light of concern for developing malignant otitis externa as he is diabetic with treat him with antibiotics for possible infection and will cover Pseudomonas.  Follow-up with PMD or ENT.        Past Medical History-hypertension, diabetes    Differential diagnosis before testing included otitis media, otitis externa, malignant otitis externa    Co-morbidities that add to the complexity of management include: None    Testing ordered during this visit included none            Disposition:          Discharge  I have discussed with the patient the results of test, differential diagnosis, treatment plan, warning signs and symptoms which should prompt immediate return.  They expressed understanding of these instructions and agrees to the following plan provided.  They were given written discharge instructions and agrees to return for any concerns and voiced understanding and all questions were answered.                               Medical Decision Making      Disposition and Plan     Clinical Impression:  1. Right ear pain         Disposition:  Discharge  4/26/2024  9:40 am    Follow-up:  Inderjit Le MD  [address] [place] [address] [id number] [phone]                Medications Prescribed:  Current Discharge Medication List        START  taking these medications    Details   levoFLOXacin 500 MG Oral Tab Take 1 tablet (500 mg total) by mouth daily for 7 days.  Qty: 7 tablet, Refills: 0

## 2024-04-26 NOTE — ED INITIAL ASSESSMENT (HPI)
Pt. States he thinks he has an ear infection. Has soreness and pain in his right ear. Hears a clicking noise in his ear. Symptoms started about 7-10 days ago.

## 2024-04-30 ENCOUNTER — OFFICE VISIT (OUTPATIENT)
Dept: PHYSICAL THERAPY | Age: 64
End: 2024-04-30
Attending: NURSE PRACTITIONER
Payer: COMMERCIAL

## 2024-04-30 ENCOUNTER — TELEPHONE (OUTPATIENT)
Dept: FAMILY MEDICINE CLINIC | Facility: CLINIC | Age: 64
End: 2024-04-30

## 2024-04-30 PROCEDURE — 97110 THERAPEUTIC EXERCISES: CPT

## 2024-04-30 PROCEDURE — 97140 MANUAL THERAPY 1/> REGIONS: CPT

## 2024-04-30 NOTE — TELEPHONE ENCOUNTER
Called pt as a friendly reminder that he is overdue for his 6 month diabetic follow up as well as his HTN follow up, pt stated he will schedule an appt on [employer] for when he's back in town.

## 2024-04-30 NOTE — PROGRESS NOTES
Dx: S/P lumbar laminectomy (Z98.890), Lumbar radiculopathy, acute (M54.16)       L4-5  DOI/S: 11/6/23      Insurance: N/A  Insurance Limits: auth required  Authorized # of Visits:  10 visits auth to 3/31- requesting additional 2/28/2024   POC/Auth Expiration: TBA   Date of Last PN: 2/28/2024  (Visit #10)  Authorizing Physician/Provider: Hammad   Next MD visit: none      - no more   Precautions: None            Subjective: He states abdominal pain completely resolved. He states insidious onset of L shoulder pain- limited AROM. He states chronic L thigh tightness still present. He states knee pain getting up off chair/kneeling. He states brace helps this.      Pain/Symptom Presentation:  Pain at rest: 0/10- low back   Pain at worst: 3/10  Location: central low back     Previous session:   Chief complaints Going up and down stairs and walking prolonged, standing prolonged, sitting prolonged minna with poor back support/position    Goals: to be able to walk the dog and improve standing tolerance. To be able to go on a hike this summer to a [place].     Currently: Spent 1.5 hours in [place]. - felt ok after this    Objective:   Post Oswestry Disability Index Score  Post Score: 2 % (4/30/2024 10:31 AM)    12 % improvement      Lumbar flexion extension FT to floor no pain or changes in symptoms   Standing repeated extension  WFL (improved from painful at last therapy session)  SB: R/L WFL     Hip screen: unremarkable for pain- slightly hypermobile bilaterally   Hip flexor length: tightness bilaterally minna on the L with cues for PPT     Core strength: 3/5       HEP: Access Code: [email or web address]  URL: [email or web address]  Date: 04/30/2024  Prepared by: Keeley Solano    Exercises  - Supine Hamstring Stretch with Strap  - 1 x daily - 7 x weekly - 3 sets - 1 reps - 30 hold  - Combined Thoracic Rotation, Hip Flexor, and Quadriceps Stretch  - 1 x daily - 7 x weekly - 3 sets - 1 reps - 30 hold  - Small Range  Straight Leg Raise  - 1 x daily - 7 x weekly - 2 sets - 20 reps - 5# ankle weight  - Sidelying Hip Abduction  - 1 x daily - 7 x weekly - 3 sets - 10 reps - 5# ankle weight  - Supine 90/90 Alternating Toe Touch  - 1 x daily - 7 x weekly - 25 reps  - Bridge with Heels on Swiss Ball  - 1 x daily - 7 x weekly - 1 sets - 25 reps  - Curl Up with Reach  - 1 x daily - 7 x weekly - 2 sets - 20 reps  - Single Leg Heel Raise  - 1 x daily - 7 x weekly - 2 sets - 10 reps  - Curl Up on Mini Swiss Ball  - 1 x daily - 7 x weekly - 1 sets - 20 reps - 10 hold  - Standing Bent Over Single Arm Scapular Row with Table Support  - 1 x daily - 7 x weekly - 1 sets - 20 reps  - Wall Push Up  - 1 x daily - 7 x weekly - 1 sets - 20 reps     Treatment:  2/21/2024   Tx#: 9/9 2/28/2024   Tx#; 10/15 3/6/2024   Tx#; 11/15  3/19/2024   Tx:12/15 4/10/2024   Tx#' 13/15 4/30/2024   Tx#; 14/15   Therapeutic Exercise: x 30 min  Supine KTC 30 sec hold R/L   DKTC 30 sec hold x 2 sets   Chantelle pose 30 sec hold- with RSB and LSB each   Seated flexion stretch 30 sec hold   Trial of indep traction- deferred   Sidelying quad stretch 30 sec hold x 2 sets on the L   Review of HEP and recommendations   Therapeutic Exercise: x 45 min  Supine KTC 30 sec hold R/L   DKTC 30 sec hold x 2 sets   ASLR with ankle weight 20#  Seated flexion stretch- used t/o therapy session   Standing upper trunk rotation with cane 10 reps R/L   Mariya row 12.5# 20 reps   Chest stretch in doorway 30 sec hold x 2 sets   Side-lying hip abduction 10 reps R/L 2#   Bridge straight leg on ball 20 reps     Reassessment 15 min      Therapeutic Exercise: x 35 min  Supine KTC 30 sec hold R/L   LTR 10 reps R/L   Knee and hip flexion with swiss ball 20 reps   Straight leg bridge on ball 10 reps   TRX row with PPT 10 reps x 2 sets     Chest stretch in doorway 30 sec hold x 2 sets   UTR with dowel 10 reps   Lat pull down 20# pulley 10 reps x 2 sets     PPT with hip flexor stretch 30 sec hold x 2 sets    PPT with hip abduction 10 reps R/l   PPT with hip extension 10 reps R/L  Therapeutic Exercise: x 25 min  Supine KTC 30 sec hold R/L   LTR 10 reps R/L   TRX row PPT 10 reps x 2 sets   Pulley lat pull down 22# 15 reps   Standing 3 way hip with PPT and green band at ankles- encouraged for less UE assist   Clam shell black band 20 reps R/L c 2 sets   DLHR 10 reps; SLHR with visual cues 10 reps R/L     Neuro martell: 8 min   Romberg on foam 30 sec hold ; with head turns R/L 10 reps x 2 sets  Lateral walk on foam beam 10' x 4 reps   Romberg eyes closed 30 sec hold x 2 sets     Pt education; HEP updates and benefits- reducing fall risk, impairments Therapeutic Exercise: x 25 min  Supine KTC 30 sec hold R/L   LTR 10 reps R/L   Prone on elbows 10 sec hold 10 reps;   Prone press ups 10 reps   Review of HEP and recommendations     Objective exam above 10 min    Therapeutic Exercise: x 30 min  Supine KTC 30 sec hold R/L   LTR 10 reps R/L   Review of HEP, reps sets, indep progressions, functional goals, strategies to achieve goals, answered all questions and concerns, updated POC and discharge planning for next therapy session, recommended follow up with physician re L shoulder pain     Manual Therapy: x30 min  Lumbar PA accessory joint mobs - L1-L5: Grade 2, 4 x 10\" each     STM to paraspinal minna on the R near PSIS 5 min     Thoracic PA accessory joint mobs - T1-T12: Grade 3, 2 x 10\" each    Roller stick to L quad 2 min  ITB on the R 2 min     Long axis traction 10 sec hold x 10 reps R/L each   Short axis traction 10 sec hold 10 reps on the R *relief of R side low back pain    Manual Therapy: x10 min  Roller stick to L quad 2 min  ITB on the R 2 min     Long axis traction 10 sec hold x 10 reps R/L each      Manual Therapy: x10 min  Roller stick to L quad 2 min  ITB on the R 2 min     Long axis traction 10 sec hold x 10 reps R/L each    Manual Therapy: x10 min  Roller stick to L quad 2 min  ITB on the R 2 min     Long axis  traction 10 sec hold x 10 reps R/L each  Manual Therapy: x20 min  Prone Skin lock gr III-IV 10 reps x 5 sets aimed above and below- eventually working toward T10 tenderness- T5- T12 range    PPIVM rotation t/o thoracic spine gr III R/L each     Long axis traction 10 sec hold x 10 reps R/L each  Manual Therapy: x10 min    Long axis traction 5 min each R/L each                      Assessment: Pt has virtually no low back pain, He has tightness in L thigh chronically, He has improved stair negotiation and plans to travel this week and will assess tolerance. He is indep with HEP but would like assistance with indep progression and maintenance of progress. Denies questions or concerns post therapy sessions. Appropriate for transition to discharge at next therapy session.     Goals: Progressing toward goals 4/30/2024      Short-Term Goals:  Patient will improve low back mobility and postural endurance to maintain proper posture with neutral lumbar spine and pelvic position while sitting to facilitate 4 hours of pain-free sitting. Timeframe: 3 weeks.- MET- in specific chair  Long-Term Goals:  Patient will improve low back pain and core endurance to facilitate walking distances of 2 miles daily to facilitate household and community ambulation. Timeframe: 6 weeks.- NOT MET  Patient will improve LE mobility, strength, and single-leg stabilization to meet strength requirements for reciprocal stair navigation pattern with no asymmetries for ascending and descending 3 flights of stairs daily for community integration. Timeframe: 6-8 weeks.- PROGRESSING  Patient will demonstrates safe and proper lifting mechanics with intermittent lifting objects of 35lbs from a lower height to facilitate housework and lifting ADL's. Timeframe: 6-8 weeks.- PROGRESSING  Patient will improve Modified Oswestry Low Back Pain Disability Questionnaire score by 10 points or 10% to indicate a true change in improved function for ADL's and restoring PLF.  Timeframe: 6-8 weeks- 8 % improvement     Plan: Continue per plan of care for 1 additional therapy session in 1-2 mo. Discharge at next therapy session       Charges: Therex x 2: manual: 1      Total Timed Treatment:  40 min    Total Treatment Time: 40 min

## 2024-05-01 ENCOUNTER — APPOINTMENT (OUTPATIENT)
Dept: PHYSICAL THERAPY | Age: 64
End: 2024-05-01
Attending: NURSE PRACTITIONER
Payer: COMMERCIAL

## 2024-05-03 ENCOUNTER — LAB ENCOUNTER (OUTPATIENT)
Dept: LAB | Age: 64
End: 2024-05-03
Attending: FAMILY MEDICINE
Payer: COMMERCIAL

## 2024-05-03 DIAGNOSIS — E78.00 PURE HYPERCHOLESTEROLEMIA: ICD-10-CM

## 2024-05-03 DIAGNOSIS — E11.40 TYPE 2 DIABETES MELLITUS WITH DIABETIC NEUROPATHY, WITHOUT LONG-TERM CURRENT USE OF INSULIN (HCC): ICD-10-CM

## 2024-05-03 LAB
ALBUMIN SERPL-MCNC: 3.8 G/DL (ref 3.4–5)
ALBUMIN/GLOB SERPL: 1.1 {RATIO} (ref 1–2)
ALP LIVER SERPL-CCNC: 87 U/L
ALT SERPL-CCNC: 18 U/L
ANION GAP SERPL CALC-SCNC: 9 MMOL/L (ref 0–18)
AST SERPL-CCNC: 11 U/L (ref 15–37)
BILIRUB SERPL-MCNC: 0.3 MG/DL (ref 0.1–2)
BUN BLD-MCNC: 28 MG/DL (ref 9–23)
CALCIUM BLD-MCNC: 9.4 MG/DL (ref 8.5–10.1)
CHLORIDE SERPL-SCNC: 106 MMOL/L (ref 98–112)
CHOLEST SERPL-MCNC: 118 MG/DL (ref ?–200)
CO2 SERPL-SCNC: 23 MMOL/L (ref 21–32)
CREAT BLD-MCNC: 1.3 MG/DL
EGFRCR SERPLBLD CKD-EPI 2021: 61 ML/MIN/1.73M2 (ref 60–?)
EST. AVERAGE GLUCOSE BLD GHB EST-MCNC: 197 MG/DL (ref 68–126)
FASTING PATIENT LIPID ANSWER: YES
FASTING STATUS PATIENT QL REPORTED: YES
GLOBULIN PLAS-MCNC: 3.5 G/DL (ref 2.8–4.4)
GLUCOSE BLD-MCNC: 195 MG/DL (ref 70–99)
HBA1C MFR BLD: 8.5 % (ref ?–5.7)
HDLC SERPL-MCNC: 45 MG/DL (ref 40–59)
LDLC SERPL CALC-MCNC: 53 MG/DL (ref ?–100)
NONHDLC SERPL-MCNC: 73 MG/DL (ref ?–130)
OSMOLALITY SERPL CALC.SUM OF ELEC: 297 MOSM/KG (ref 275–295)
POTASSIUM SERPL-SCNC: 4.2 MMOL/L (ref 3.5–5.1)
PROT SERPL-MCNC: 7.3 G/DL (ref 6.4–8.2)
SODIUM SERPL-SCNC: 138 MMOL/L (ref 136–145)
TRIGL SERPL-MCNC: 111 MG/DL (ref 30–149)
VLDLC SERPL CALC-MCNC: 16 MG/DL (ref 0–30)

## 2024-05-03 PROCEDURE — 83036 HEMOGLOBIN GLYCOSYLATED A1C: CPT

## 2024-05-03 PROCEDURE — 36415 COLL VENOUS BLD VENIPUNCTURE: CPT

## 2024-05-03 PROCEDURE — 80053 COMPREHEN METABOLIC PANEL: CPT

## 2024-05-03 PROCEDURE — 80061 LIPID PANEL: CPT

## 2024-05-07 ENCOUNTER — OFFICE VISIT (OUTPATIENT)
Dept: FAMILY MEDICINE CLINIC | Facility: CLINIC | Age: 64
End: 2024-05-07
Payer: COMMERCIAL

## 2024-05-07 ENCOUNTER — TELEPHONE (OUTPATIENT)
Dept: FAMILY MEDICINE CLINIC | Facility: CLINIC | Age: 64
End: 2024-05-07

## 2024-05-07 VITALS
WEIGHT: 257 LBS | DIASTOLIC BLOOD PRESSURE: 82 MMHG | SYSTOLIC BLOOD PRESSURE: 144 MMHG | BODY MASS INDEX: 31.29 KG/M2 | TEMPERATURE: 98 F | RESPIRATION RATE: 14 BRPM | HEIGHT: 75.98 IN | HEART RATE: 86 BPM

## 2024-05-07 DIAGNOSIS — H61.21 IMPACTED CERUMEN OF RIGHT EAR: ICD-10-CM

## 2024-05-07 DIAGNOSIS — E11.42 TYPE 2 DIABETES MELLITUS WITH POLYNEUROPATHY (HCC): Primary | ICD-10-CM

## 2024-05-07 DIAGNOSIS — M25.512 ACUTE PAIN OF LEFT SHOULDER: ICD-10-CM

## 2024-05-07 DIAGNOSIS — Z23 ENCOUNTER FOR IMMUNIZATION: ICD-10-CM

## 2024-05-07 DIAGNOSIS — Z87.891 HISTORY OF CIGARETTE SMOKING: ICD-10-CM

## 2024-05-07 PROCEDURE — 3052F HG A1C>EQUAL 8.0%<EQUAL 9.0%: CPT | Performed by: FAMILY MEDICINE

## 2024-05-07 PROCEDURE — 3079F DIAST BP 80-89 MM HG: CPT | Performed by: FAMILY MEDICINE

## 2024-05-07 PROCEDURE — 90750 HZV VACC RECOMBINANT IM: CPT | Performed by: FAMILY MEDICINE

## 2024-05-07 PROCEDURE — 3077F SYST BP >= 140 MM HG: CPT | Performed by: FAMILY MEDICINE

## 2024-05-07 PROCEDURE — 3008F BODY MASS INDEX DOCD: CPT | Performed by: FAMILY MEDICINE

## 2024-05-07 PROCEDURE — 99214 OFFICE O/P EST MOD 30 MIN: CPT | Performed by: FAMILY MEDICINE

## 2024-05-07 PROCEDURE — 90471 IMMUNIZATION ADMIN: CPT | Performed by: FAMILY MEDICINE

## 2024-05-07 RX ORDER — TIRZEPATIDE 5 MG/.5ML
5 INJECTION, SOLUTION SUBCUTANEOUS WEEKLY
Qty: 6 ML | Refills: 0 | Status: SHIPPED | OUTPATIENT
Start: 2024-05-07

## 2024-05-07 NOTE — PROGRESS NOTES
[facility] Family Medicine Office Note  Chief Complaint:   Chief Complaint   Patient presents with    Lab Results       HPI:   This is a 63 year old male coming in for lab review as well as other concerns.  The patient states that he has been taking his medications as prescribed.  He does state that he has been overindulge thing within the last couple weeks still.  He does have a history of type 2 diabetes he states that recently has had some increased discomfort of the left shoulder.  States that he has not had any trauma or injury to the area.  But does have some pain with certain movements.  He also states that he was at a walk-in clinic recently due to a right ear infection.  He states that he still has some discomfort in the ear as well as some muffled hearing.      Past Medical History:    Arthritis    Minor joint pains mostly in the fingers    Back problem    Belching    Occasionally    Bloating    Occasionally    Constipation    Occasionally    Diabetic peripheral neuropathy (HCC)    Esophageal reflux    Flatulence/gas pain/belching    Occasionally    Frequent urination    Frequent use of laxatives    Occasionally    Hearing loss    Age related    Heartburn    Occasionally    High blood pressure    High cholesterol    Indigestion    Occasionally    Intractable vomiting with nausea, unspecified vomiting type    Irregular bowel habits    Frequently    Neuropathy    MID-SHIN AND DOWN    Osteoarthritis    GENERALIZED    Pain with bowel movements    Occasionally    Sleep apnea    CPAP    Type II or unspecified type diabetes mellitus without mention of complication, not stated as uncontrolled    Unspecified essential hypertension    Visual impairment    Wears glasses    Readers    Weight gain    Lock down     Past Surgical History:   Procedure Laterality Date    Appendectomy      Back surgery  6-7-2021    Colonoscopy      Colonoscopy  2015    Other      Right big toe partially amputated    Other surgical  history  2023    REDO LUMBAR 4, LUMBAR 5 LAMINECTOMIES    Sigmoidoscopy,diagnostic      Spine surgery procedure unlisted          Vasectomy  18 years ago     Social History:  Social History     Socioeconomic History    Marital status:    Tobacco Use    Smoking status: Former     Current packs/day: 0.00     Average packs/day: 1 pack/day for 38.0 years (38.0 ttl pk-yrs)     Types: Cigarettes     Start date: 1981     Quit date: 2019     Years since quittin.6    Smokeless tobacco: Never    Tobacco comments:     Quit in 2019   Vaping Use    Vaping status: Never Used   Substance and Sexual Activity    Alcohol use: Yes     Alcohol/week: 4.0 standard drinks of alcohol     Types: 4 Cans of beer per week     Comment: rarely    Drug use: No   Other Topics Concern    Caffeine Concern Yes     Comment: 2 cup coffee per day    Exercise No     Comment: on hold      Family History:  Family History   Problem Relation Age of Onset    Cancer Father         colon    Colon Cancer Father         Cause of death    Other (Other) Mother         alcoholism    Alcohol and Other Disorders Associated Mother         Contributed to her death in      Allergies:  No Known Allergies  Current Meds:  Current Outpatient Medications   Medication Sig Dispense Refill    Tirzepatide (MOUNJARO) 5 MG/0.5ML Subcutaneous Solution Pen-injector Inject 5 mg into the skin once a week. 6 mL 0    atorvastatin 10 MG Oral Tab Take 1 tablet (10 mg total) by mouth nightly. 30 tablet 0    metFORMIN HCl 1000 MG Oral Tab Take 1 tablet (1,000 mg total) by mouth 2 (two) times daily with meals. 180 tablet 0    Insulin Pen Needle (BD PEN NEEDLE KATY 2ND GEN) 32G X 4 MM Does not apply Misc 1 strip by In Vitro route daily. 100 each 1    losartan 100 MG Oral Tab Take 1 tablet (100 mg total) by mouth daily. 90 tablet 1    Empagliflozin (JARDIANCE) 25 MG Oral Tab Take 1 tablet by mouth daily. 90 tablet 1    Glucose Blood (CONTOUR NEXT TEST) In  Vitro Strip Pt testing bid Pt uses contour next meter 200 each 3    Alcohol Swabs (ALCOHOL PREP) Does not apply Pads Use daily with victoza 90 each 3    FAMOTIDINE OR Take 1 tablet by mouth daily as needed.      atorvastatin 10 MG Oral Tab Take 1 tablet (10 mg total) by mouth nightly. (Patient not taking: Reported on 4/26/2024) 90 tablet 0    traMADol 50 MG Oral Tab Take 1 tablet (50 mg total) by mouth every 6 (six) hours as needed for Pain. (Patient not taking: Reported on 5/7/2024) 40 tablet 0    PEG 3350-KCl-Na Bicarb-NaCl 420 g Oral Recon Soln Take 4,000 mL by mouth As Directed. (Patient not taking: Reported on 3/29/2024) 1 each 0    Cyclobenzaprine HCl 7.5 MG Oral Tab Take 1 tablet (7.5 mg total) by mouth 3 (three) times daily as needed for Muscle spasms. (Patient not taking: Reported on 4/26/2024) 40 tablet 0    gabapentin 300 MG Oral Cap Take 1 capsule (300 mg total) by mouth in the morning and 1 capsule (300 mg total) at noon and 1 capsule (300 mg total) in the evening. (Patient not taking: Reported on 5/7/2024) 270 capsule 1      Counseling given: Not Answered  Tobacco comments: Quit in 2019       REVIEW OF SYSTEMS:   Consitutional: No fevers, chills, sweats  Eye: No recent visual problems  ENMT: No ear pain nasal congestion sore throat  Respiratory: No shortness of breath, cough  Cardiovascular: No chest pain palpitations syncope  Gastrointestinal: No nausea vomiting diarrhea  Genitourinary: No hematuria  Hema/Lymph no bruising tendency, swollen lymph glands  Endocrine: Negative for excessive thirst excessive hunger  Musculoskeletal: No back pain neck pain joint pain muscle pain decreased range of motion positive left shoulder pain    Medical, surgical, family, and social histories were reviewed      EXAM:   VITALS:   Vitals:    05/07/24 1111   BP: 144/82   Pulse: 86   Resp: 14   Temp: 97.7 °F (36.5 °C)      GENERAL: well developed, well nourished, in no apparent distress  SKIN: no rashes, no suspicious  lesions: Cool and Dry  HEENT: atraumatic, normocephalic, ears and throat are clear    Ears: Right external canal with cerumen present tympanic membrane lucent nonbulging  EYES: Pupils equal round and reactive.  Extraocular motions intact no scleral icterus no injection or drainage  THROAT without erythema tonsillar hypertrophy or exudate.  Uvula midline airway patent  NECK: Given midline.  No JVD or lymphadenopathy supple nontender no meningeal signs   LUNGS: clear to auscultation sounds equal bilaterally no wheezes rales or rhonchi  CARDIO: Regular rate and rhythm without murmurs gallops or rubs  GI: Soft nontender nondistended no hepatomegaly palpable masses.  No guarding.   without deformity or crepitus no flank tenderness  EXTREMITIES: no cyanosis, clubbing or edema no joint tenderness effusion or edema noted.  There is some pain to supination pronation of the left shoulder as well as pain with liftoff test of the left shoulder\there is pain to palpation of the anterior shoulder.   strength intact    ASSESSMENT AND PLAN:   1. Type 2 diabetes mellitus with polyneuropathy (HCC)  - Tirzepatide (MOUNJARO) 5 MG/0.5ML Subcutaneous Solution Pen-injector; Inject 5 mg into the skin once a week.  Dispense: 6 mL; Refill: 0  - Hemoglobin A1C; Future  - Comp Metabolic Panel (14); Future  - Lipid Panel; Future  - Microalb/Creat Ratio, Random Urine; Future  I did discuss with the patient his hemoglobin A1c did go up at this point I did ask him to discontinue the Victoza.  He was asked to continue with Jardiance as well as metformin we will be adding on Mounjaro 5 mg once a week.  I did discuss with the patient to watch portion control.  Will be updating blood work in the next 3 months you need a hemoglobin A1c CMP lipid panel urine microalbumin.  2. History of cigarette smoking  - CT LUNG LD SCREENING(CPT=71271); Future  I did discuss with the patient that I would like to complete a CT of his lungs he does have  a history of smoking.  He was asked to have this completed.  3. Impacted cerumen of right ear  - ENT Referral - In Network  I did discuss with the patient we can refer him to ENT for further recommendations for possible cerumen impaction removal.  4. Encounter for immunization  - ZOSTER VACC RECOMBINANT IM NJX  I did discuss with the patient we will be updating his immunizations today will complete the Shingrix vaccine series.  5. Acute pain of left shoulder  I did discuss with the patient would like for him to follow-up with orthopedics.  He states that he would like to let me know if he would like to see.  I did discuss with him to use anti-inflammatories right now or topical agents in the meantime.    Meds & Refills for this Visit:  Requested Prescriptions     Signed Prescriptions Disp Refills    Tirzepatide (MOUNJARO) 5 MG/0.5ML Subcutaneous Solution Pen-injector 6 mL 0     Sig: Inject 5 mg into the skin once a week.       Health Maintenance:  Health Maintenance Due   Topic Date Due    Lung Cancer Screening  Never done    COVID-19 Vaccine (4 - 2023-24 season) 09/01/2023    Diabetes Care Dilated Eye Exam  12/30/2023    Annual Depression Screening  01/01/2024       Patient/Caregiver Education: Patient/Caregiver Education: There are no barriers to learning. Medical education done.   Outcome: Patient verbalizes understanding. Patient is notified to call with any questions, complications, allergies, or worsening or changing symptoms.  Patient is to call with any side effects or complications from the treatments as a result of today.     Problem List:  Patient Active Problem List   Diagnosis    Type 2 diabetes mellitus with polyneuropathy (HCC)    Unspecified hereditary and idiopathic peripheral neuropathy    Psychosexual dysfunction with inhibited sexual excitement    Screening for other and unspecified endocrine, nutritional, metabolic, and immunity disorders    Special screening for malignant neoplasm of prostate     Screening for iron deficiency anemia    Other nonspecific finding on examination of urine    Screening for other and unspecified genitourinary condition    Diabetic neuropathy (HCC)    Essential hypertension    Blister of toe, infected    Sx 7/26/19, Left Realignment and Arthrodesis Revision, 1st, 2nd TMT Joint and [facility], [facility], Prox Chili Autograft w/ Dr. [facility] [employer] Exp 10/24/19    Charcot's arthropathy    Sx 10/19/18, Left 1st and 2nd TMT Realignment Arthrodesis w/ Dr. [facility] [employer] Exp 1/17/19    Secondary localized osteoarthrosis of ankle and foot, left    Left ankle pain, unspecified chronicity    Special screening for malignant neoplasms, colon    Family history of colon cancer    Benign neoplasm of cecum    Benign neoplasm of transverse colon    Benign neoplasm of descending colon    Flank pain    Left hip pain    Intractable vomiting with nausea, unspecified vomiting type    Acute left-sided low back pain without sciatica    Ulcer of right foot, limited to breakdown of skin (HCC)    Constipation    Lumbar radiculopathy    History of falling    Hyperlipidemia, unspecified    Long term (current) use of oral hypoglycemic drugs    Obstructive sleep apnea (adult) (pediatric)    Personal history of nicotine dependence    Unspecified hearing loss, unspecified ear    Radiculopathy, lumbar region    Type 2 diabetes mellitus with foot ulcer (CODE) (HCC)    Non-prs chronic ulcer oth prt r foot limited to brkdwn skin (HCC)    Cataract    History of adenomatous polyp of colon         No follow-ups on file.     Inderjit Le MD    Please note that portions of this note may have been completed with a voice recognition program. Efforts were made to edit the dictations but occasionally words are mis-transcribed.

## 2024-05-07 NOTE — TELEPHONE ENCOUNTER
Ericka,Pharmacist ([facility]) called for order clarification on patient's Mounjaro 5mg medication that was prescribed today. She wants to know if patient was started on lower dose since Mounjaro is a titrated medication.   Discussed with Dr. Le and he states, patient was on Victoza before so it is okay to dispense medication as prescribed.  Pharmacy was notified and no further question at this time.

## 2024-05-08 RX ORDER — EMPAGLIFLOZIN 25 MG/1
1 TABLET, FILM COATED ORAL DAILY
Qty: 90 TABLET | Refills: 0 | Status: SHIPPED | OUTPATIENT
Start: 2024-05-08

## 2024-05-08 RX ORDER — LOSARTAN POTASSIUM 100 MG/1
100 TABLET ORAL DAILY
Qty: 90 TABLET | Refills: 0 | Status: SHIPPED | OUTPATIENT
Start: 2024-05-08

## 2024-05-08 NOTE — TELEPHONE ENCOUNTER
Last office visit: 5/7/2024  Last Refill:   Return To Clinic: none stated  Protocol:   Medication Quantity Refills Start End   Empagliflozin (JARDIANCE) 25 MG Oral Tab 90 tablet 1 9/21/2023 --   Sig:   Take 1 tablet by mouth daily.     Route:   Oral       Medication Quantity Refills Start End   losartan 100 MG Oral Tab 90 tablet 1 10/17/2023 --   Sig:   Take 1 tablet (100 mg total) by mouth daily.     Route:   Oral

## 2024-05-11 ENCOUNTER — PATIENT MESSAGE (OUTPATIENT)
Dept: FAMILY MEDICINE CLINIC | Facility: CLINIC | Age: 64
End: 2024-05-11

## 2024-05-11 DIAGNOSIS — M25.512 ACUTE PAIN OF LEFT SHOULDER: Primary | ICD-10-CM

## 2024-05-13 ENCOUNTER — APPOINTMENT (OUTPATIENT)
Dept: PHYSICAL THERAPY | Age: 64
End: 2024-05-13
Attending: NURSE PRACTITIONER
Payer: COMMERCIAL

## 2024-05-14 NOTE — TELEPHONE ENCOUNTER
Referral for Dr. Lopez faxed to [phone].  Please authorize referral for Dr. Ramírez Lyon for acute left shoulder pain.

## 2024-05-14 NOTE — TELEPHONE ENCOUNTER
From: Silvestre Riley Jr.  To: Inderjit Le  Sent: 5/11/2024 8:17 AM CDT  Subject: ENT & Orthopedic referral    Dr. Le, Dr. Lopez's office asked if you could fax the referral to [phone].    For the Orthopedic referral I would like to see Dr. Cruz. He is with [facility]. Please let me know. Thank you.    Rosas Riley.

## 2024-05-17 ENCOUNTER — PATIENT OUTREACH (OUTPATIENT)
Dept: FAMILY MEDICINE CLINIC | Facility: CLINIC | Age: 64
End: 2024-05-17

## 2024-05-17 NOTE — PROGRESS NOTES
Left voicemail for patient to let him know that he is due for a blood pressure check in June and to call back to schedule an appointment with Dr. Le.

## 2024-05-23 ENCOUNTER — TELEPHONE (OUTPATIENT)
Dept: FAMILY MEDICINE CLINIC | Facility: CLINIC | Age: 64
End: 2024-05-23

## 2024-05-24 ENCOUNTER — OFFICE VISIT (OUTPATIENT)
Dept: FAMILY MEDICINE CLINIC | Facility: CLINIC | Age: 64
End: 2024-05-24

## 2024-05-24 VITALS
HEART RATE: 85 BPM | DIASTOLIC BLOOD PRESSURE: 58 MMHG | TEMPERATURE: 98 F | SYSTOLIC BLOOD PRESSURE: 122 MMHG | HEIGHT: 76 IN | BODY MASS INDEX: 30.69 KG/M2 | RESPIRATION RATE: 18 BRPM | WEIGHT: 252 LBS

## 2024-05-24 DIAGNOSIS — E11.40 TYPE 2 DIABETES MELLITUS WITH DIABETIC NEUROPATHY, WITHOUT LONG-TERM CURRENT USE OF INSULIN (HCC): ICD-10-CM

## 2024-05-24 DIAGNOSIS — N40.1 BENIGN PROSTATIC HYPERPLASIA WITH NOCTURIA: ICD-10-CM

## 2024-05-24 DIAGNOSIS — H61.21 IMPACTED CERUMEN OF RIGHT EAR: ICD-10-CM

## 2024-05-24 DIAGNOSIS — G89.29 CHRONIC LEFT SHOULDER PAIN: ICD-10-CM

## 2024-05-24 DIAGNOSIS — M54.16 LUMBAR RADICULOPATHY, ACUTE: ICD-10-CM

## 2024-05-24 DIAGNOSIS — R35.1 BENIGN PROSTATIC HYPERPLASIA WITH NOCTURIA: ICD-10-CM

## 2024-05-24 DIAGNOSIS — M25.512 CHRONIC LEFT SHOULDER PAIN: ICD-10-CM

## 2024-05-24 DIAGNOSIS — I10 ESSENTIAL HYPERTENSION: Primary | ICD-10-CM

## 2024-05-24 PROCEDURE — 3078F DIAST BP <80 MM HG: CPT | Performed by: FAMILY MEDICINE

## 2024-05-24 PROCEDURE — 3074F SYST BP LT 130 MM HG: CPT | Performed by: FAMILY MEDICINE

## 2024-05-24 PROCEDURE — 3008F BODY MASS INDEX DOCD: CPT | Performed by: FAMILY MEDICINE

## 2024-05-24 PROCEDURE — 99214 OFFICE O/P EST MOD 30 MIN: CPT | Performed by: FAMILY MEDICINE

## 2024-05-24 NOTE — PROGRESS NOTES
[facility] Family Medicine Office Note  Chief Complaint:   Chief Complaint   Patient presents with    Follow - Up    Hypertension       HPI:   This is a 63 year old male coming in for blood pressure check.  The patient states that his blood pressures at home have been elevated on his machine.  States that he was concerned.  States that he has been taking his medications as prescribed.  He is also would like to have a new referral for ENT as he has been having some trouble getting through to  [facility] He would also like to have a referral for left shoulder discomfort states that he has not had any trauma or injury but does have some pain      Past Medical History:    Arthritis    Minor joint pains mostly in the fingers    Back problem    Belching    Occasionally    Bloating    Occasionally    Constipation    Occasionally    Diabetic peripheral neuropathy (HCC)    Esophageal reflux    Flatulence/gas pain/belching    Occasionally    Frequent urination    Frequent use of laxatives    Occasionally    Hearing loss    Age related    Heartburn    Occasionally    High blood pressure    High cholesterol    Indigestion    Occasionally    Intractable vomiting with nausea, unspecified vomiting type    Irregular bowel habits    Frequently    Neuropathy    MID-SHIN AND DOWN    Osteoarthritis    GENERALIZED    Pain with bowel movements    Occasionally    Sleep apnea    CPAP    Type II or unspecified type diabetes mellitus without mention of complication, not stated as uncontrolled    Unspecified essential hypertension    Visual impairment    Wears glasses    Readers    Weight gain    Lock down     Past Surgical History:   Procedure Laterality Date    Appendectomy      Back surgery  6-7-2021    Colonoscopy      Colonoscopy  2015    Other      Right big toe partially amputated    Other surgical history  11/06/2023    REDO LUMBAR 4, LUMBAR 5 LAMINECTOMIES    Sigmoidoscopy,diagnostic  1989    Spine surgery procedure unlisted           Vasectomy  18 years ago     Social History:  Social History     Socioeconomic History    Marital status:    Tobacco Use    Smoking status: Former     Current packs/day: 0.00     Average packs/day: 1 pack/day for 38.0 years (38.0 ttl pk-yrs)     Types: Cigarettes     Start date: 1981     Quit date: 2019     Years since quittin.6    Smokeless tobacco: Never    Tobacco comments:     Quit in 2019   Vaping Use    Vaping status: Never Used   Substance and Sexual Activity    Alcohol use: Yes     Alcohol/week: 4.0 standard drinks of alcohol     Types: 4 Cans of beer per week     Comment: Occasionally    Drug use: No   Other Topics Concern    Caffeine Concern Yes     Comment: 2 cup coffee per day    Exercise No     Comment: on hold      Family History:  Family History   Problem Relation Age of Onset    Cancer Father         colon    Colon Cancer Father         Cause of death    Other (Other) Mother         alcoholism    Alcohol and Other Disorders Associated Mother         Contributed to her death in      Allergies:  No Known Allergies  Current Meds:  Current Outpatient Medications   Medication Sig Dispense Refill    Glucose Blood (CONTOUR NEXT TEST) In Vitro Strip Pt testing bid Pt uses contour next meter 200 each 3    Empagliflozin (JARDIANCE) 25 MG Oral Tab Take 1 tablet by mouth daily. 90 tablet 0    losartan 100 MG Oral Tab Take 1 tablet (100 mg total) by mouth daily. 90 tablet 0    Tirzepatide (MOUNJARO) 5 MG/0.5ML Subcutaneous Solution Pen-injector Inject 5 mg into the skin once a week. 6 mL 0    atorvastatin 10 MG Oral Tab Take 1 tablet (10 mg total) by mouth nightly. 30 tablet 0    traMADol 50 MG Oral Tab Take 1 tablet (50 mg total) by mouth every 6 (six) hours as needed for Pain. 40 tablet 0    metFORMIN HCl 1000 MG Oral Tab Take 1 tablet (1,000 mg total) by mouth 2 (two) times daily with meals. 180 tablet 0    Insulin Pen Needle (BD PEN NEEDLE KATY 2ND GEN) 32G X 4 MM Does not  apply Misc 1 strip by In Vitro route daily. 100 each 1    FAMOTIDINE OR Take 1 tablet by mouth daily as needed.        Counseling given: Not Answered  Tobacco comments: Quit in 2019       REVIEW OF SYSTEMS:   Consitutional: No fevers, chills, sweats  Eye: No recent visual problems  ENMT: No ear pain nasal congestion sore throat  Respiratory: No shortness of breath, cough  Cardiovascular: No chest pain palpitations syncope  Gastrointestinal: No nausea vomiting diarrhea  Genitourinary: No hematuria  Hema/Lymph no bruising tendency, swollen lymph glands  Endocrine: Negative for excessive thirst excessive hunger  Musculoskeletal: No back pain neck pain joint pain muscle pain decreased range of motion  Positive left shoulder pain    Medical, surgical, family, and social histories were reviewed      EXAM:   VITALS:   Vitals:    05/24/24 1111   BP: 122/58   Pulse: 85   Resp: 18   Temp: 97.6 °F (36.4 °C)      GENERAL: well developed, well nourished, in no apparent distress  SKIN: no rashes, no suspicious lesions: Cool and Dry  HEENT: atraumatic, normocephalic, ears and throat are clear    Ears: TM's clear and visible bilaterally, positive cerumen impaction on the right ear  EYES: Pupils equal round and reactive.  Extraocular motions intact no scleral icterus no injection or drainage  THROAT without erythema tonsillar hypertrophy or exudate.  Uvula midline airway patent  NECK: Given midline.  No JVD or lymphadenopathy supple nontender no meningeal signs   LUNGS: clear to auscultation sounds equal bilaterally no wheezes rales or rhonchi  CARDIO: Regular rate and rhythm without murmurs gallops or rubs  GI: Soft nontender nondistended no hepatomegaly palpable masses.  No guarding.   without deformity or crepitus no flank tenderness  EXTREMITIES: no cyanosis, clubbing or edema no joint tenderness effusion or edema noted.  There is some pain to supination pronation of the left shoulder as well as pain to palpation of  anterior acromioclavicular joint    ASSESSMENT AND PLAN:   1. Essential hypertension  I did discuss with the patient his blood pressure is well-controlled at this time he was asked to continue with the losartan as well as to watch his salt intake he was asked to follow-up if he would like to have his blood pressure machine calibrated here.  2. Impacted cerumen of right ear  - ENT Referral - In Network  Did discuss with the patient we will go ahead and change the referral to [facility].  He was asked to continue to use Debrox as needed  3. Chronic left shoulder pain  - Ortho Referral - In Network  I tried to discuss with the patient at this time we will go ahead and place the referral for orthopedics he was asked to continue to use topical agents to help with any discomfort.  4. Lumbar radiculopathy, acute    5. Type 2 diabetes mellitus with diabetic neuropathy, without long-term current use of insulin (HCC)  - Glucose Blood (CONTOUR NEXT TEST) In Vitro Strip; Pt testing bid Pt uses contour next meter  Dispense: 200 each; Refill: 3    6. Benign prostatic hyperplasia with nocturia  - PSA Total, Screen; Future       Meds & Refills for this Visit:  Requested Prescriptions     Signed Prescriptions Disp Refills    Glucose Blood (CONTOUR NEXT TEST) In Vitro Strip 200 each 3     Sig: Pt testing bid Pt uses contour next meter       Health Maintenance:  Health Maintenance Due   Topic Date Due    Lung Cancer Screening  Never done    COVID-19 Vaccine (4 - 2023-24 season) 09/01/2023    Diabetes Care Dilated Eye Exam  12/30/2023       Patient/Caregiver Education: Patient/Caregiver Education: There are no barriers to learning. Medical education done.   Outcome: Patient verbalizes understanding. Patient is notified to call with any questions, complications, allergies, or worsening or changing symptoms.  Patient is to call with any side effects or complications from the treatments as a result of today.     Problem List:  Patient  Active Problem List   Diagnosis    Type 2 diabetes mellitus with polyneuropathy (HCC)    Unspecified hereditary and idiopathic peripheral neuropathy    Psychosexual dysfunction with inhibited sexual excitement    Screening for other and unspecified endocrine, nutritional, metabolic, and immunity disorders    Special screening for malignant neoplasm of prostate    Screening for iron deficiency anemia    Other nonspecific finding on examination of urine    Screening for other and unspecified genitourinary condition    Diabetic neuropathy (HCC)    Essential hypertension    Blister of toe, infected    Sx 7/26/19, Left Realignment and Arthrodesis Revision, 1st, 2nd TMT Joint [facility], [facility], Prox [place] Autograft w/ Dr. [facility] [employer] Exp 10/24/19    Charcot's arthropathy    Sx 10/19/18, Left 1st and 2nd TMT Realignment Arthrodesis w/ Dr. [facility] [employer] Exp 1/17/19    Secondary localized osteoarthrosis of ankle and foot, left    Left ankle pain, unspecified chronicity    Special screening for malignant neoplasms, colon    Family history of colon cancer    Benign neoplasm of cecum    Benign neoplasm of transverse colon    Benign neoplasm of descending colon    Flank pain    Left hip pain    Intractable vomiting with nausea, unspecified vomiting type    Acute left-sided low back pain without sciatica    Ulcer of right foot, limited to breakdown of skin (HCC)    Constipation    Lumbar radiculopathy    History of falling    Hyperlipidemia, unspecified    Long term (current) use of oral hypoglycemic drugs    Obstructive sleep apnea (adult) (pediatric)    Personal history of nicotine dependence    Unspecified hearing loss, unspecified ear    Radiculopathy, lumbar region    Type 2 diabetes mellitus with foot ulcer (CODE) (HCC)    Non-prs chronic ulcer oth prt r foot limited to brkdwn skin (HCC)    Cataract    History of adenomatous polyp of colon         No follow-ups on file.     Inderjit Le MD    Please note that  portions of this note may have been completed with a voice recognition program. Efforts were made to edit the dictations but occasionally words are mis-transcribed.

## 2024-05-31 ENCOUNTER — TELEPHONE (OUTPATIENT)
Dept: FAMILY MEDICINE CLINIC | Facility: CLINIC | Age: 64
End: 2024-05-31

## 2024-06-03 ENCOUNTER — MED REC SCAN ONLY (OUTPATIENT)
Dept: FAMILY MEDICINE CLINIC | Facility: CLINIC | Age: 64
End: 2024-06-03

## 2024-06-07 DIAGNOSIS — E11.42 DIABETIC POLYNEUROPATHY ASSOCIATED WITH TYPE 2 DIABETES MELLITUS (HCC): ICD-10-CM

## 2024-06-07 DIAGNOSIS — E11.40 TYPE 2 DIABETES MELLITUS WITH DIABETIC NEUROPATHY, WITHOUT LONG-TERM CURRENT USE OF INSULIN (HCC): ICD-10-CM

## 2024-06-07 NOTE — TELEPHONE ENCOUNTER
Last office visit: 5/24/2024  Last Refill: 3/4/2024  Return To Clinic: none stated per last office visit  Protocol: Please refill if appropriate         Medication Quantity Refills Start End   metFORMIN HCl 1000 MG Oral Tab 180 tablet 0 3/4/2024 --   Sig:   Take 1 tablet (1,000 mg total) by mouth 2 (two) times daily with meals.     Route:   Oral

## 2024-06-10 ENCOUNTER — OFFICE VISIT (OUTPATIENT)
Dept: PHYSICAL THERAPY | Age: 64
End: 2024-06-10
Attending: NURSE PRACTITIONER
Payer: COMMERCIAL

## 2024-06-10 PROCEDURE — 97110 THERAPEUTIC EXERCISES: CPT

## 2024-06-10 NOTE — PROGRESS NOTES
Dx: S/P lumbar laminectomy (Z98.890), Lumbar radiculopathy, acute (M54.16)       L4-5  DOI/S: 11/6/23      Insurance: N/A  Insurance Limits: auth required  Authorized # of Visits:  15 total auth   POC/Auth Expiration: TBA   Authorizing Physician/Provider: Hammad   Next MD visit: none      - no more   Precautions: None       Discharge Summary  Pt has attended 15 visits in Physical Therapy.     Subjective: He can do all exercises but mostly pain in the R knee is limiting. He gets a soreness in his R side of his low back that he has been pushing through with ASLR (with 5# ankle weight and with marches). He states this lingers. Post therapy session denies questions or concerns.   Goals: wants to be able to walk- has to trial this.      Pain/Symptom Presentation:  Pain at rest: 0/10- low back   Pain at worst: 5/10  Location: R side low back     Previous session:   Chief complaints Going up and down stairs and walking prolonged, standing prolonged, sitting prolonged minna with poor back support/position    Goals: to be able to walk the dog and improve standing tolerance. To be able to go [place]     Currently: Spent 1.5 hours in [employer]. - felt ok after this    Objective:   Post Oswestry Disability Index Score  Post Score: 2 % (4/30/2024 10:31 AM)    12 % improvement      Lumbar flexion extension FT to floor no pain or changes in symptoms   Standing repeated extension  WFL (improved from painful at last therapy session)  SB: R/L WFL     Hip screen: unremarkable for pain- slightly hypermobile bilaterally   Hip flexor length: tightness bilaterally minna on the L with cues for PPT     Core strength: 3/5       HEP: Access Code: [email or web address]  URL: [email or web address]  Date: 04/30/2024  Prepared by: Keeley Solano    Exercises  - Supine Hamstring Stretch with Strap  - 1 x daily - 7 x weekly - 3 sets - 1 reps - 30 hold  - Combined Thoracic Rotation, Hip Flexor, and Quadriceps  Stretch  - 1 x daily - 7 x weekly - 3 sets - 1 reps - 30 hold  - Small Range Straight Leg Raise  - 1 x daily - 7 x weekly - 2 sets - 20 reps - 5# ankle weight  - Sidelying Hip Abduction  - 1 x daily - 7 x weekly - 3 sets - 10 reps - 5# ankle weight  - Supine 90/90 Alternating Toe Touch  - 1 x daily - 7 x weekly - 25 reps  - Bridge with Heels on Swiss Ball  - 1 x daily - 7 x weekly - 1 sets - 25 reps  - Curl Up with Reach  - 1 x daily - 7 x weekly - 2 sets - 20 reps  - Single Leg Heel Raise  - 1 x daily - 7 x weekly - 2 sets - 10 reps  - Curl Up on Mini Swiss Ball  - 1 x daily - 7 x weekly - 1 sets - 20 reps - 10 hold  - Standing Bent Over Single Arm Scapular Row with Table Support  - 1 x daily - 7 x weekly - 1 sets - 20 reps  - Wall Push Up  - 1 x daily - 7 x weekly - 1 sets - 20 reps   6/10/2024 HEP MODIFICATIONS:   Access Code: [email or web address]  URL: [email or web address]  Date: 06/10/2024  Prepared by: Keeley Solano    Program Notes  discontinue: straight leg raise and marches on the R Trial walking program: let Keeley know how that goesAsk about diabetic shoes/stockingsDiscontinue/Modify any exercises causing L shoulder pain    Exercises  - Seated Thoracic Lumbar Extension  - 1 x daily - 7 x weekly - 3 sets - 10 reps  - Supine Psoas Self-Massage  - 1 x daily - 7 x weekly - 1 reps - 2 min hold  - Curl Up with Reach  - 1 x daily - 7 x weekly - 1 sets - 10 reps    Treatment:  2/21/2024   Tx#: 9/9 2/28/2024   Tx#; 10/15 3/6/2024   Tx#; 11/15  3/19/2024   Tx:12/15 4/10/2024   Tx#' 13/15 4/30/2024   Tx#; 14/15 6/10/2024   15/15   Therapeutic Exercise: x 30 min  Supine KTC 30 sec hold R/L   DKTC 30 sec hold x 2 sets   Chantelle pose 30 sec hold- with RSB and LSB each   Seated flexion stretch 30 sec hold   Trial of indep traction- deferred   Sidelying quad stretch 30 sec hold x 2 sets on the L   Review of HEP and recommendations   Therapeutic Exercise: x 45 min  Supine KTC 30 sec hold R/L   DKTC 30 sec hold x 2  sets   ASLR with ankle weight 20#  Seated flexion stretch- used t/o therapy session   Standing upper trunk rotation with cane 10 reps R/L   Mariya row 12.5# 20 reps   Chest stretch in doorway 30 sec hold x 2 sets   Side-lying hip abduction 10 reps R/L 2#   Bridge straight leg on ball 20 reps     Reassessment 15 min      Therapeutic Exercise: x 35 min  Supine KTC 30 sec hold R/L   LTR 10 reps R/L   Knee and hip flexion with swiss ball 20 reps   Straight leg bridge on ball 10 reps   TRX row with PPT 10 reps x 2 sets     Chest stretch in doorway 30 sec hold x 2 sets   UTR with dowel 10 reps   Lat pull down 20# pulley 10 reps x 2 sets     PPT with hip flexor stretch 30 sec hold x 2 sets   PPT with hip abduction 10 reps R/l   PPT with hip extension 10 reps R/L  Therapeutic Exercise: x 25 min  Supine KTC 30 sec hold R/L   LTR 10 reps R/L   TRX row PPT 10 reps x 2 sets   Pulley lat pull down 22# 15 reps   Standing 3 way hip with PPT and green band at ankles- encouraged for less UE assist   Clam shell black band 20 reps R/L c 2 sets   DLHR 10 reps; SLHR with visual cues 10 reps R/L     Neuro martell: 8 min   Romberg on foam 30 sec hold ; with head turns R/L 10 reps x 2 sets  Lateral walk on foam beam 10' x 4 reps   Romberg eyes closed 30 sec hold x 2 sets     Pt education; HEP updates and benefits- reducing fall risk, impairments Therapeutic Exercise: x 25 min  Supine KTC 30 sec hold R/L   LTR 10 reps R/L   Prone on elbows 10 sec hold 10 reps;   Prone press ups 10 reps   Review of HEP and recommendations     Objective exam above 10 min    Therapeutic Exercise: x 30 min  Supine KTC 30 sec hold R/L   LTR 10 reps R/L   Review of HEP, reps sets, indep progressions, functional goals, strategies to achieve goals, answered all questions and concerns, updated POC and discharge planning for next therapy session, recommended follow up with physician re L shoulder pain   Therapeutic Exercise: x 40 min  Supine marches- with variety of  cues and modifications- *R side low back pain   Supine ASLR *R LBP  LTR 10 reps R/L   Knee to chest double 30 sec hold x 3 sets   Seated thoracic extension over chair 10 reps x 2 sets   Self psoas release 2 min   Curl ups 10 reps   Seated crunches 10 sec hold 2 reps   Pt education: HEP updates, progress, what to continue and what to discontinue, avoiding aggravating.       Manual: Psoas release 2 min    Manual Therapy: x30 min  Lumbar PA accessory joint mobs - L1-L5: Grade 2, 4 x 10\" each     STM to paraspinal minna on the R near PSIS 5 min     Thoracic PA accessory joint mobs - T1-T12: Grade 3, 2 x 10\" each    Roller stick to L quad 2 min  ITB on the R 2 min     Long axis traction 10 sec hold x 10 reps R/L each   Short axis traction 10 sec hold 10 reps on the R *relief of R side low back pain    Manual Therapy: x10 min  Roller stick to L quad 2 min  ITB on the R 2 min     Long axis traction 10 sec hold x 10 reps R/L each      Manual Therapy: x10 min  Roller stick to L quad 2 min  ITB on the R 2 min     Long axis traction 10 sec hold x 10 reps R/L each    Manual Therapy: x10 min  Roller stick to L quad 2 min  ITB on the R 2 min     Long axis traction 10 sec hold x 10 reps R/L each  Manual Therapy: x20 min  Prone Skin lock gr III-IV 10 reps x 5 sets aimed above and below- eventually working toward T10 tenderness- T5- T12 range    PPIVM rotation t/o thoracic spine gr III R/L each     Long axis traction 10 sec hold x 10 reps R/L each  Manual Therapy: x10 min    Long axis traction 5 min each R/L each                         Assessment: Pt has some progressive R side low back pain and restrictions in psoas- this improved with manual intervention. Pt was able to compete leg lifts without pain post intervention. Educated on exercises to target rectus abdominus and release hip flexors on the R. Educated on thoracic mobilization to reduce strain on lumbar spine. Educated on avoiding sustained sitting positions. Educated on  initiating walking program. At this time pt is indep with HEP. He has not met all goals but has indep program to progress toward them. Pt appropriate for POC below.     Goals:   Short-Term Goals:  Patient will improve low back mobility and postural endurance to maintain proper posture with neutral lumbar spine and pelvic position while sitting to facilitate 4 hours of pain-free sitting. Timeframe: 3 weeks.- MET- in specific chair  Long-Term Goals:  Patient will improve low back pain and core endurance to facilitate walking distances of 2 miles daily to facilitate household and community ambulation. Timeframe: 6 weeks.-NOT MET  Patient will improve LE mobility, strength, and single-leg stabilization to meet strength requirements for reciprocal stair navigation pattern with no asymmetries for ascending and descending 3 flights of stairs daily for community integration. Timeframe: 6-8 weeks.- MET  Patient will demonstrates safe and proper lifting mechanics with intermittent lifting objects of 35lbs from a lower height to facilitate housework and lifting ADL's. Timeframe: 6-8 weeks.- MET- 80# equipment   Patient will improve Modified Oswestry Low Back Pain Disability Questionnaire score by 10 points or 10% to indicate a true change in improved function for ADL's and restoring PLF. Timeframe: 6-8 weeks- 12% improvement - MET    Plan: Discharge to [facility] HEP trial.       Charges: Therex x 3   Total Timed Treatment:  42 min    Total Treatment Time: 45 min

## 2024-06-11 RX ORDER — ATORVASTATIN CALCIUM 10 MG/1
10 TABLET, FILM COATED ORAL NIGHTLY
Qty: 30 TABLET | Refills: 1 | Status: SHIPPED | OUTPATIENT
Start: 2024-06-11

## 2024-06-11 NOTE — TELEPHONE ENCOUNTER
Last office visit: 5/24/2024  Last Refill: 4/26/2024  Return To Clinic: none stated per last office visit  Protocol: pass           Medication Quantity Refills Start End   atorvastatin 10 MG Oral Tab 30 tablet 0 4/26/2024 --   Sig:   Take 1 tablet (10 mg total) by mouth nightly.     Route:   Oral

## 2024-06-18 ENCOUNTER — HOSPITAL ENCOUNTER (OUTPATIENT)
Dept: CT IMAGING | Facility: HOSPITAL | Age: 64
Discharge: HOME | End: 2024-06-18
Attending: FAMILY MEDICINE

## 2024-06-18 DIAGNOSIS — Z87.891 HISTORY OF CIGARETTE SMOKING: ICD-10-CM

## 2024-06-18 PROCEDURE — 71271 CT THORAX LUNG CANCER SCR C-: CPT | Performed by: FAMILY MEDICINE

## 2024-07-12 ENCOUNTER — LAB ENCOUNTER (OUTPATIENT)
Dept: LAB | Age: 64
End: 2024-07-12
Attending: FAMILY MEDICINE
Payer: COMMERCIAL

## 2024-07-12 DIAGNOSIS — E11.42 TYPE 2 DIABETES MELLITUS WITH POLYNEUROPATHY (HCC): ICD-10-CM

## 2024-07-12 LAB
ALBUMIN SERPL-MCNC: 4.1 G/DL (ref 3.4–5)
ALBUMIN/GLOB SERPL: 1.2 {RATIO} (ref 1–2)
ALP LIVER SERPL-CCNC: 74 U/L
ALT SERPL-CCNC: 20 U/L
ANION GAP SERPL CALC-SCNC: 7 MMOL/L (ref 0–18)
AST SERPL-CCNC: 11 U/L (ref 15–37)
BILIRUB SERPL-MCNC: 0.3 MG/DL (ref 0.1–2)
BUN BLD-MCNC: 23 MG/DL (ref 9–23)
CALCIUM BLD-MCNC: 9.5 MG/DL (ref 8.5–10.1)
CHLORIDE SERPL-SCNC: 108 MMOL/L (ref 98–112)
CHOLEST SERPL-MCNC: 129 MG/DL (ref ?–200)
CO2 SERPL-SCNC: 26 MMOL/L (ref 21–32)
CREAT BLD-MCNC: 1.16 MG/DL
CREAT UR-SCNC: 55.5 MG/DL
EGFRCR SERPLBLD CKD-EPI 2021: 70 ML/MIN/1.73M2 (ref 60–?)
EST. AVERAGE GLUCOSE BLD GHB EST-MCNC: 140 MG/DL (ref 68–126)
FASTING PATIENT LIPID ANSWER: YES
FASTING STATUS PATIENT QL REPORTED: YES
GLOBULIN PLAS-MCNC: 3.5 G/DL (ref 2.8–4.4)
GLUCOSE BLD-MCNC: 119 MG/DL (ref 70–99)
HBA1C MFR BLD: 6.5 % (ref ?–5.7)
HDLC SERPL-MCNC: 43 MG/DL (ref 40–59)
LDLC SERPL CALC-MCNC: 65 MG/DL (ref ?–100)
MICROALBUMIN UR-MCNC: 0.76 MG/DL
MICROALBUMIN/CREAT 24H UR-RTO: 13.7 UG/MG (ref ?–30)
NONHDLC SERPL-MCNC: 86 MG/DL (ref ?–130)
OSMOLALITY SERPL CALC.SUM OF ELEC: 297 MOSM/KG (ref 275–295)
POTASSIUM SERPL-SCNC: 4.3 MMOL/L (ref 3.5–5.1)
PROT SERPL-MCNC: 7.6 G/DL (ref 6.4–8.2)
SODIUM SERPL-SCNC: 141 MMOL/L (ref 136–145)
TRIGL SERPL-MCNC: 114 MG/DL (ref 30–149)
VLDLC SERPL CALC-MCNC: 17 MG/DL (ref 0–30)

## 2024-07-12 PROCEDURE — 83036 HEMOGLOBIN GLYCOSYLATED A1C: CPT

## 2024-07-12 PROCEDURE — 36415 COLL VENOUS BLD VENIPUNCTURE: CPT

## 2024-07-12 PROCEDURE — 80053 COMPREHEN METABOLIC PANEL: CPT

## 2024-07-12 PROCEDURE — 82570 ASSAY OF URINE CREATININE: CPT

## 2024-07-12 PROCEDURE — 82043 UR ALBUMIN QUANTITATIVE: CPT

## 2024-07-12 PROCEDURE — 80061 LIPID PANEL: CPT

## 2024-07-14 DIAGNOSIS — E11.42 TYPE 2 DIABETES MELLITUS WITH POLYNEUROPATHY (HCC): ICD-10-CM

## 2024-07-14 DIAGNOSIS — G89.18 POST-OP PAIN: ICD-10-CM

## 2024-07-14 RX ORDER — TRAMADOL HYDROCHLORIDE 50 MG/1
50 TABLET ORAL EVERY 6 HOURS PRN
Qty: 40 TABLET | Refills: 0 | Status: CANCELLED | OUTPATIENT
Start: 2024-07-14

## 2024-07-15 ENCOUNTER — OFFICE VISIT (OUTPATIENT)
Dept: PODIATRY CLINIC | Facility: CLINIC | Age: 64
End: 2024-07-15
Payer: COMMERCIAL

## 2024-07-15 VITALS — SYSTOLIC BLOOD PRESSURE: 124 MMHG | DIASTOLIC BLOOD PRESSURE: 72 MMHG

## 2024-07-15 DIAGNOSIS — E11.42 TYPE 2 DIABETES MELLITUS WITH POLYNEUROPATHY (HCC): ICD-10-CM

## 2024-07-15 DIAGNOSIS — L84 CALLUS OF FOOT: ICD-10-CM

## 2024-07-15 DIAGNOSIS — M25.572 SINUS TARSI SYNDROME, LEFT: ICD-10-CM

## 2024-07-15 DIAGNOSIS — M14.672 CHARCOT'S JOINT OF FOOT, LEFT: ICD-10-CM

## 2024-07-15 DIAGNOSIS — E08.42 DIABETIC POLYNEUROPATHY ASSOCIATED WITH DIABETES MELLITUS DUE TO UNDERLYING CONDITION (HCC): Primary | ICD-10-CM

## 2024-07-15 RX ORDER — ATORVASTATIN CALCIUM 10 MG/1
10 TABLET, FILM COATED ORAL NIGHTLY
Qty: 30 TABLET | Refills: 1 | Status: SHIPPED | OUTPATIENT
Start: 2024-07-15

## 2024-07-15 RX ORDER — TIRZEPATIDE 5 MG/.5ML
5 INJECTION, SOLUTION SUBCUTANEOUS WEEKLY
Qty: 6 ML | Refills: 0 | Status: SHIPPED | OUTPATIENT
Start: 2024-07-15

## 2024-07-15 NOTE — PROGRESS NOTES
Silvestre Riley Jr. is a 63 year old male.   Chief Complaint   Patient presents with    New Patient     LOV with Dr Ahumada 4/2/21. Patient is here for left foot, Dr Rodriguez did surgery around 2016/2017. Patient did have another surgery completed by a different Orthopedic surgeon, x2. Left foot pain 5-7/10, he does have neuropathy. FBS this am 102, last A1C was done on 7/12/24 with the result of 6.5.         HPI:   This gentleman presents to the clinic he has rather diffuse foot pain on the left he did have a posterior tibial tendon repair evidently with a cadaver graft.  He also has had reconstruction with Dr. Mayorga but that broke down so he had to have it operated on the second time.  So he gets some diffuse pain but when it really hurts it hurts along the outside of his left ankle.  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  At today's visit reviewed nurse's history as taken above, allergies medications and medical history as documented below.  All changes duly noted  Allergies: Patient has no known allergies.   Current Outpatient Medications   Medication Sig Dispense Refill    Tirzepatide (MOUNJARO) 5 MG/0.5ML Subcutaneous Solution Pen-injector Inject 5 mg into the skin once a week. 6 mL 0    atorvastatin 10 MG Oral Tab Take 1 tablet (10 mg total) by mouth nightly. 30 tablet 1    metFORMIN HCl 1000 MG Oral Tab Take 1 tablet (1,000 mg total) by mouth 2 (two) times daily with meals. 180 tablet 0    Glucose Blood (CONTOUR NEXT TEST) In Vitro Strip Pt testing bid Pt uses contour next meter 200 each 3    Empagliflozin (JARDIANCE) 25 MG Oral Tab Take 1 tablet by mouth daily. 90 tablet 0    losartan 100 MG Oral Tab Take 1 tablet (100 mg total) by mouth daily. 90 tablet 0    traMADol 50 MG Oral Tab Take 1 tablet (50 mg total) by mouth every 6 (six) hours as needed for Pain. 40 tablet 0    Insulin Pen Needle (BD PEN NEEDLE KATY 2ND GEN) 32G X 4 MM Does not apply  Misc 1 strip by In Vitro route daily. 100 each 1    FAMOTIDINE OR Take 1 tablet by mouth daily as needed.        Past Medical History:    Arthritis    Minor joint pains mostly in the fingers    Back problem    Belching    Occasionally    Bloating    Occasionally    Constipation    Occasionally    Diabetic peripheral neuropathy (HCC)    Esophageal reflux    Flatulence/gas pain/belching    Occasionally    Frequent urination    Frequent use of laxatives    Occasionally    Hearing loss    Age related    Heartburn    Occasionally    High blood pressure    High cholesterol    Indigestion    Occasionally    Intractable vomiting with nausea, unspecified vomiting type    Irregular bowel habits    Frequently    Neuropathy    MID-SHIN AND DOWN    Osteoarthritis    GENERALIZED    Pain with bowel movements    Occasionally    Sleep apnea    CPAP    Type II or unspecified type diabetes mellitus without mention of complication, not stated as uncontrolled    Unspecified essential hypertension    Visual impairment    Wears glasses    Readers    Weight gain    Lock down      Past Surgical History:   Procedure Laterality Date    Appendectomy      Back surgery  6-7-2021    Colonoscopy      Colonoscopy  2015    Other      Right big toe partially amputated    Other surgical history  11/06/2023    REDO LUMBAR 4, LUMBAR 5 LAMINECTOMIES    Sigmoidoscopy,diagnostic  1989    Spine surgery procedure unlisted      2021    Vasectomy  18 years ago      Family History   Problem Relation Age of Onset    Cancer Father         colon    Colon Cancer Father         Cause of death    Other (Other) Mother         alcoholism    Alcohol and Other Disorders Associated Mother         Contributed to her death in 1982      Social History     Socioeconomic History    Marital status:    Tobacco Use    Smoking status: Former     Current packs/day: 0.00     Average packs/day: 1 pack/day for 38.0 years (38.0 ttl pk-yrs)     Types: Cigarettes     Start date:  1981     Quit date: 2019     Years since quittin.8    Smokeless tobacco: Never    Tobacco comments:     Quit in 2019   Vaping Use    Vaping status: Never Used   Substance and Sexual Activity    Alcohol use: Yes     Alcohol/week: 4.0 standard drinks of alcohol     Types: 4 Cans of beer per week     Comment: Occasionally    Drug use: No   Other Topics Concern    Caffeine Concern Yes     Comment: 2 cup coffee per day    Exercise No     Comment: on hold            REVIEW OF SYSTEMS:   Today reviewed systens as documented below  GENERAL HEALTH: feels well otherwise  SKIN: Refer to exam below  RESPIRATORY: denies shortness of breath with exertion  CARDIOVASCULAR: denies chest pain on exertion  GI: denies abdominal pain and denies heartburn  NEURO: denies headaches    EXAM:   /72 (BP Location: Right arm, Patient Position: Sitting, Cuff Size: adult)   GENERAL: well developed, well nourished, in no apparent distress  EXTREMITIES:   1. Integument: The skin on his feet was evaluated is warm and dry he has erythema edema along the medial aspect of his foot where his surgeries were performed.  He has surgical scars there as well.  He has hyperkeratosis underneath the first metatarsal head on the right fifth metatarsal head on the left and some of his toenails are mildly to moderately thickened.   2. Vascular: Patient has palpable pulses but weakened bilateral   3. Neurologic: Patient has diminished pedal sensation cannot feel the McGehee-Jean-Pierre 10 g filament longstanding history of diabetic peripheral neuropathy   4. Musculoskeletal: Patient has reconstruction on his medial column of his foot on the left.  He has some weakness of the tibialis anterior which is partially torn on the right.  He has hyperkeratoses on the feet as documented above from foot deformity.  X-rays were taken 3 views on the left foot show plate fixation with medial column fusion excluding the talonavicular joint as well as the second  ray fusion.  Plates are in good position other joints appear mildly osteoarthritic such as the fourth and fifth met cuboid joint calcaneocuboid joint subtalar joints actually look pretty clean.  Patient has pain on palpation of the sinus tarsi despite peripheral neuropathy on the left.    ASSESSMENT AND PLAN:   Diagnoses and all orders for this visit:    Diabetic polyneuropathy associated with diabetes mellitus due to underlying condition (HCC)    Type 2 diabetes mellitus with polyneuropathy (HCC)    Sinus tarsi syndrome, left    Charcot's joint of foot, left    Callus of foot    Other orders  -     [facility] AMB POD XR - LT FOOT 3 VIEWS(AP,LAT,OBLIQUE) WT BEARING        Plan: At today's visit his pain is fairly diffuse in both feet he does take gabapentin my recommendation was to see Dr. Karen Galdamez regarding his overall left foot problems and pain.Today discussed the nature and extent of a cortisone injection as well as the possible complications and risks.  Patient was in agreement to receive the injection.  The patient was consented for a cortisone injection and after timeout was taken the injection consisting of 20 mg Kenalog and 0.5 cc of 0.5% Marcaine plain was injected into the symptomatic sinus tarsi, left foot using aseptic technique and appropriate approach.  A Band-Aid was applied to the injection site.   He can follow-up with us in a few weeks but regarding his diffuse pain is on the left as well as his tibialis anterior tendon rupture which is actually a partial tear he should follow-up with Dr. Galdamez.  The patient indicates understanding of these issues and agrees to the plan.    Guilherme Ahumada DPM

## 2024-07-15 NOTE — PROGRESS NOTES
Per Dr. Ahumada draw up 0.5ml of 0.5% Marcaine and 0.5ml of Kenalog 40 for injection to left foot.

## 2024-07-15 NOTE — TELEPHONE ENCOUNTER
Patient with tramadol refill request. He has not been seen since 12/2023. Surgery 11/2023. Was advised to follow up as needed.     [facility] nursing can we do a patient outreach to see why the patient is requesting Tramadol?

## 2024-07-15 NOTE — TELEPHONE ENCOUNTER
Medication:   traMADol 50 MG Oral Tab 40 tablet 0 3/28/2024 --   Sig:   Take 1 tablet (50 mg total) by mouth every 6 (six) hours as needed for Pain.          Date of last refill: 3.28.24 (#40/0)  [place]      Last office visit: 12.19.23  Due back to clinic per last office note:  PRN   Date next office visit scheduled:    Future Appointments   Date Time Provider Department Center   7/15/2024 10:30 AM Guilherme Ahumada DPM [id number]   7/17/2024 10:30 AM Inderjit Le MD [id number]           Last OV note recommendation:       \"ASSESSMENT:  S/p Redo L4, 5 laminectomies      PLAN:  Ok to lift bending, lifting, twisting restrictions  2.   Tramadol refilled to Mail order pharmacy  3.   F/u PRN \"

## 2024-07-16 ENCOUNTER — TELEPHONE (OUTPATIENT)
Dept: FAMILY MEDICINE CLINIC | Facility: CLINIC | Age: 64
End: 2024-07-16

## 2024-07-16 DIAGNOSIS — J34.3 HYPERTROPHY, NASAL, TURBINATE: Primary | ICD-10-CM

## 2024-07-17 ENCOUNTER — EKG ENCOUNTER (OUTPATIENT)
Dept: LAB | Age: 64
End: 2024-07-17
Attending: FAMILY MEDICINE
Payer: COMMERCIAL

## 2024-07-17 ENCOUNTER — OFFICE VISIT (OUTPATIENT)
Dept: FAMILY MEDICINE CLINIC | Facility: CLINIC | Age: 64
End: 2024-07-17
Payer: COMMERCIAL

## 2024-07-17 VITALS
DIASTOLIC BLOOD PRESSURE: 74 MMHG | WEIGHT: 243 LBS | HEIGHT: 76 IN | RESPIRATION RATE: 18 BRPM | SYSTOLIC BLOOD PRESSURE: 126 MMHG | HEART RATE: 82 BPM | BODY MASS INDEX: 29.59 KG/M2 | TEMPERATURE: 98 F

## 2024-07-17 DIAGNOSIS — Z01.818 PREOP GENERAL PHYSICAL EXAM: Primary | ICD-10-CM

## 2024-07-17 DIAGNOSIS — S86.219A TEAR OF TIBIALIS ANTERIOR TENDON: ICD-10-CM

## 2024-07-17 DIAGNOSIS — M14.672 CHARCOT ANKLE, LEFT: ICD-10-CM

## 2024-07-17 DIAGNOSIS — Z01.818 PREOP GENERAL PHYSICAL EXAM: ICD-10-CM

## 2024-07-17 LAB
ATRIAL RATE: 81 BPM
P AXIS: 35 DEGREES
P-R INTERVAL: 154 MS
Q-T INTERVAL: 334 MS
QRS DURATION: 84 MS
QTC CALCULATION (BEZET): 387 MS
R AXIS: -19 DEGREES
T AXIS: 47 DEGREES
VENTRICULAR RATE: 81 BPM

## 2024-07-17 PROCEDURE — 3008F BODY MASS INDEX DOCD: CPT | Performed by: FAMILY MEDICINE

## 2024-07-17 PROCEDURE — 99214 OFFICE O/P EST MOD 30 MIN: CPT | Performed by: FAMILY MEDICINE

## 2024-07-17 PROCEDURE — 3061F NEG MICROALBUMINURIA REV: CPT | Performed by: FAMILY MEDICINE

## 2024-07-17 PROCEDURE — 93005 ELECTROCARDIOGRAM TRACING: CPT

## 2024-07-17 PROCEDURE — 3078F DIAST BP <80 MM HG: CPT | Performed by: FAMILY MEDICINE

## 2024-07-17 PROCEDURE — 3044F HG A1C LEVEL LT 7.0%: CPT | Performed by: FAMILY MEDICINE

## 2024-07-17 PROCEDURE — 3074F SYST BP LT 130 MM HG: CPT | Performed by: FAMILY MEDICINE

## 2024-07-17 PROCEDURE — 93010 ELECTROCARDIOGRAM REPORT: CPT | Performed by: INTERNAL MEDICINE

## 2024-07-17 PROCEDURE — G2211 COMPLEX E/M VISIT ADD ON: HCPCS | Performed by: FAMILY MEDICINE

## 2024-07-17 NOTE — PROGRESS NOTES
[facility] Office Note  Chief Complaint:   Chief Complaint   Patient presents with    Pre-Op Exam     Surgery is scheduled for 07/31/2024 with Dr. Lopez.  BILATERAL ENDOSCOPIC TURBINATE REDUCTION at [facility].        HPI:   This is a 63 year old male coming in for preop physical.  The patient will be undergoing bilateral turbinate reduction.  The patient states that he has not been ill as of late.  Denies any fever cough congestion denies any shortness of breath or chest pain.  Denies any issues with anesthesia.      Past Medical History:    Arthritis    Minor joint pains mostly in the fingers    Back problem    Belching    Occasionally    Bloating    Occasionally    Constipation    Occasionally    Diabetic peripheral neuropathy (HCC)    Esophageal reflux    Flatulence/gas pain/belching    Occasionally    Frequent urination    Frequent use of laxatives    Occasionally    Hearing loss    Age related    Heartburn    Occasionally    High blood pressure    High cholesterol    Indigestion    Occasionally    Intractable vomiting with nausea, unspecified vomiting type    Irregular bowel habits    Frequently    Neuropathy    MID-SHIN AND DOWN    Osteoarthritis    GENERALIZED    Pain with bowel movements    Occasionally    Sleep apnea    CPAP    Type II or unspecified type diabetes mellitus without mention of complication, not stated as uncontrolled    Unspecified essential hypertension    Visual impairment    Wears glasses    Readers    Weight gain    Lock down     Past Surgical History:   Procedure Laterality Date    Appendectomy      Back surgery  6-7-2021    Colonoscopy      Colonoscopy  2015    Other      Right big toe partially amputated    Other surgical history  11/06/2023    REDO LUMBAR 4, LUMBAR 5 LAMINECTOMIES    Sigmoidoscopy,diagnostic  1989    Spine surgery procedure unlisted      2021    Vasectomy  18 years ago     Social History:  Social History     Socioeconomic  History    Marital status:    Tobacco Use    Smoking status: Former     Current packs/day: 0.00     Average packs/day: 1 pack/day for 38.0 years (38.0 ttl pk-yrs)     Types: Cigarettes     Start date: 1981     Quit date: 2019     Years since quittin.8    Smokeless tobacco: Never    Tobacco comments:     Quit in 2019   Vaping Use    Vaping status: Never Used   Substance and Sexual Activity    Alcohol use: Yes     Alcohol/week: 4.0 standard drinks of alcohol     Types: 4 Cans of beer per week     Comment: Occasionally    Drug use: No   Other Topics Concern    Caffeine Concern Yes     Comment: 2 cup coffee per day    Exercise No     Comment: on hold      Family History:  Family History   Problem Relation Age of Onset    Cancer Father         colon    Colon Cancer Father         Cause of death    Other (Other) Mother         alcoholism    Alcohol and Other Disorders Associated Mother         Contributed to her death in      Allergies:  No Known Allergies  Current Meds:  Current Outpatient Medications   Medication Sig Dispense Refill    Tirzepatide (MOUNJARO) 5 MG/0.5ML Subcutaneous Solution Pen-injector Inject 5 mg into the skin once a week. 6 mL 0    atorvastatin 10 MG Oral Tab Take 1 tablet (10 mg total) by mouth nightly. 30 tablet 1    metFORMIN HCl 1000 MG Oral Tab Take 1 tablet (1,000 mg total) by mouth 2 (two) times daily with meals. 180 tablet 0    Glucose Blood (CONTOUR NEXT TEST) In Vitro Strip Pt testing bid Pt uses contour next meter 200 each 3    Empagliflozin (JARDIANCE) 25 MG Oral Tab Take 1 tablet by mouth daily. 90 tablet 0    losartan 100 MG Oral Tab Take 1 tablet (100 mg total) by mouth daily. 90 tablet 0    traMADol 50 MG Oral Tab Take 1 tablet (50 mg total) by mouth every 6 (six) hours as needed for Pain. 40 tablet 0    Insulin Pen Needle (BD PEN NEEDLE KATY 2ND GEN) 32G X 4 MM Does not apply Misc 1 strip by In Vitro route daily. 100 each 1    FAMOTIDINE OR Take 1 tablet by  mouth daily as needed.        Counseling given: Not Answered  Tobacco comments: Quit in 2019       REVIEW OF SYSTEMS:   Consitutional: No fevers, chills, sweats  Eye: No recent visual problems  ENMT: No ear pain nasal congestion sore throat  Respiratory: No shortness of breath, cough  Cardiovascular: No chest pain palpitations syncope  Gastrointestinal: No nausea vomiting diarrhea  Genitourinary: No hematuria  Hema/Lymph no bruising tendency, swollen lymph glands  Endocrine: Negative for excessive thirst excessive hunger  Musculoskeletal: No back pain neck pain joint pain muscle pain decreased range of motion  Integumentary: No rash, pruritus, abrasions  Neurologic: Alert and oriented x4  Psychiatric: No anxiety, depression    Medical, surgical, family, and social histories were reviewed      EXAM:   VITALS:   Vitals:    07/17/24 1047   BP: 126/74   Pulse: 82   Resp: 18   Temp: 97.6 °F (36.4 °C)      GENERAL: well developed, well nourished, in no apparent distress  SKIN: no rashes, no suspicious lesions: Cool and Dry  HEENT: atraumatic, normocephalic, ears and throat are clear    Ears: TM's clear and visible bilaterally, no excess cerumen or erythema.   EYES: Pupils equal round and reactive.  Extraocular motions intact no scleral icterus no injection or drainage  THROAT without erythema tonsillar hypertrophy or exudate.  Uvula midline airway patent  NECK: Given midline.  No JVD or lymphadenopathy supple nontender no meningeal signs   LUNGS: clear to auscultation sounds equal bilaterally no wheezes rales or rhonchi  CARDIO: Regular rate and rhythm without murmurs gallops or rubs  GI: Soft nontender nondistended no hepatomegaly palpable masses.  No guarding.   without deformity or crepitus no flank tenderness  EXTREMITIES: no cyanosis, clubbing or edema no joint tenderness effusion or edema noted.  No calf tenderness negative Homans' sign bilaterally  NEURO: Awake and alert.  Cranial nerves II through XII  intact motor and sensory grossly within normal limits.  5 out of 5 muscle strength in all muscle groups.  Normal speech.  PSYCHIATRIC: Awake, alert and oriented x3, cooperative appropriate mood and affect.  Judgment intact       ASSESSMENT AND PLAN:   1. Preop general physical exam  - EKG 12 Lead; Future  Patient is a low risk for low risk surgery he is cleared for surgery he was asked to complete an EKG.  Was asked to follow-up with ENT for further recommendations.  2. Tear of tibialis anterior tendon  - Ortho Referral - In Network  Did discuss with the patient we will go ahead and place a referral for orthopedics he did see podiatry and they have recommended further evaluation   3. Charcot ankle, left  - Ortho Referral - In Network       Meds & Refills for this Visit:  Requested Prescriptions      No prescriptions requested or ordered in this encounter       Health Maintenance:  Health Maintenance Due   Topic Date Due    COVID-19 Vaccine (4 - 2023-24 season) 09/01/2023    Diabetes Care Foot Exam  08/08/2024    Annual Physical  10/17/2024       Patient/Caregiver Education: Patient/Caregiver Education: There are no barriers to learning. Medical education done.   Outcome: Patient verbalizes understanding. Patient is notified to call with any questions, complications, allergies, or worsening or changing symptoms.  Patient is to call with any side effects or complications from the treatments as a result of today.     Problem List:  Patient Active Problem List   Diagnosis    Type 2 diabetes mellitus with polyneuropathy (HCC)    Unspecified hereditary and idiopathic peripheral neuropathy    Psychosexual dysfunction with inhibited sexual excitement    Screening for other and unspecified endocrine, nutritional, metabolic, and immunity disorders    Special screening for malignant neoplasm of prostate    Screening for iron deficiency anemia    Other nonspecific finding on examination of urine    Screening for other and  unspecified genitourinary condition    Diabetic neuropathy (HCC)    Essential hypertension    Blister of toe, infected    Sx 7/26/19, Left Realignment and Arthrodesis Revision, 1st, 2nd TMT [facility] [place] [facility], [facility], Prox [place] Autograft w/ Dr. [employer] Exp 10/24/19    Charcot's arthropathy    Sx 10/19/18, Left 1st and 2nd TMT Realignment Arthrodesis w/ Dr. [employer] Exp 1/17/19    Secondary localized osteoarthrosis of ankle and foot, left    Left ankle pain, unspecified chronicity    Special screening for malignant neoplasms, colon    Family history of colon cancer    Benign neoplasm of cecum    Benign neoplasm of transverse colon    Benign neoplasm of descending colon    Flank pain    Left hip pain    Intractable vomiting with nausea, unspecified vomiting type    Acute left-sided low back pain without sciatica    Ulcer of right foot, limited to breakdown of skin (HCC)    Constipation    Lumbar radiculopathy    History of falling    Hyperlipidemia, unspecified    Long term (current) use of oral hypoglycemic drugs    Obstructive sleep apnea (adult) (pediatric)    Personal history of nicotine dependence    Unspecified hearing loss, unspecified ear    Radiculopathy, lumbar region    Type 2 diabetes mellitus with foot ulcer (CODE) (HCC)    Non-prs chronic ulcer oth prt r foot limited to brkdwn skin (HCC)    Cataract    History of adenomatous polyp of colon         No follow-ups on file.     Inderjit Le MD    Please note that portions of this note may have been completed with a voice recognition program. Efforts were made to edit the dictations but occasionally words are mis-transcribed.

## 2024-07-25 RX ORDER — EMPAGLIFLOZIN 25 MG/1
1 TABLET, FILM COATED ORAL DAILY
Qty: 90 TABLET | Refills: 0 | Status: SHIPPED | OUTPATIENT
Start: 2024-07-25

## 2024-07-29 ENCOUNTER — TELEPHONE (OUTPATIENT)
Dept: FAMILY MEDICINE CLINIC | Facility: CLINIC | Age: 64
End: 2024-07-29

## 2024-07-29 NOTE — TELEPHONE ENCOUNTER
Carmen called from [facility]/ Dr. Lopez's office. Patient is scheduled for surgery on this Wednesday 07/31/2024. They are looking for the referral for the procedure. I called and spoke to Vilma SANCHEZ in referrals. She put in an urgent request for the referral to be reviewed and authorized. She will follow up on the status of the referral. I asked if she could please make sure it is faxed to [phone] or [phone]. .Vilma verbalized understanding.

## 2024-08-08 ENCOUNTER — PATIENT MESSAGE (OUTPATIENT)
Dept: FAMILY MEDICINE CLINIC | Facility: CLINIC | Age: 64
End: 2024-08-08

## 2024-08-08 RX ORDER — LOSARTAN POTASSIUM 100 MG/1
100 TABLET ORAL DAILY
Qty: 90 TABLET | Refills: 0 | Status: SHIPPED | OUTPATIENT
Start: 2024-08-08

## 2024-08-08 RX ORDER — ATORVASTATIN CALCIUM 10 MG/1
10 TABLET, FILM COATED ORAL NIGHTLY
Qty: 90 TABLET | Refills: 0 | Status: SHIPPED | OUTPATIENT
Start: 2024-08-08 | End: 2024-11-06

## 2024-08-08 NOTE — TELEPHONE ENCOUNTER
From: Silvestre Riley Jr.  To: Inderjit Le  Sent: 8/8/2024 10:22 AM CDT  Subject: Prescription refills    I got a call from [facility] saying they have request several refills from your office and have not gotten a response. I am almost out of Losartan and low on the other two. Please look into this and remedy the situation. Thank you.    Rosas Riley [phone]

## 2024-09-09 DIAGNOSIS — E11.40 TYPE 2 DIABETES MELLITUS WITH DIABETIC NEUROPATHY, WITHOUT LONG-TERM CURRENT USE OF INSULIN (HCC): ICD-10-CM

## 2024-09-09 DIAGNOSIS — E11.42 DIABETIC POLYNEUROPATHY ASSOCIATED WITH TYPE 2 DIABETES MELLITUS (HCC): ICD-10-CM

## 2024-09-12 ENCOUNTER — PATIENT MESSAGE (OUTPATIENT)
Dept: FAMILY MEDICINE CLINIC | Facility: CLINIC | Age: 64
End: 2024-09-12

## 2024-09-12 NOTE — TELEPHONE ENCOUNTER
From: Silvestre Riley Jr.  To: Inderjit Le  Sent: 9/12/2024 12:34 PM CDT  Subject: Prescription refills    Dr. Le, I got a call form the [facility] [employer] mail order refill service saying that my recent requests for refills have not been approved by your office. The refills are for Gabapentin, Metformin. Please look into this for me. Thank you.

## 2024-09-18 RX ORDER — AZELASTINE 1 MG/ML
2 SPRAY, METERED NASAL 2 TIMES DAILY
COMMUNITY
Start: 2024-08-14

## 2024-09-18 RX ORDER — ATORVASTATIN CALCIUM 10 MG/1
10 TABLET, FILM COATED ORAL NIGHTLY
Qty: 90 TABLET | Refills: 0 | Status: SHIPPED | OUTPATIENT
Start: 2024-09-18 | End: 2024-12-17

## 2024-09-30 ENCOUNTER — PATIENT MESSAGE (OUTPATIENT)
Dept: FAMILY MEDICINE CLINIC | Facility: CLINIC | Age: 64
End: 2024-09-30

## 2024-09-30 DIAGNOSIS — E08.42 DIABETIC POLYNEUROPATHY ASSOCIATED WITH DIABETES MELLITUS DUE TO UNDERLYING CONDITION (HCC): ICD-10-CM

## 2024-09-30 NOTE — TELEPHONE ENCOUNTER
From: Silvestre Riley Jr.  To: Inderjit Le  Sent: 9/30/2024 9:56 AM CDT  Subject: Prescription refill issue    For some reason I can't get refills on my Gabapentin 300mg Capsules. It has disappeared from my list of scrips on My Chart and the mail order pharmacy says refills are not available. I am down to a day or so supply. Could you please call in a scrip to the [facility] on [address] [place] [address] in [place]. Thank you.

## 2024-10-01 RX ORDER — GABAPENTIN 300 MG/1
300 CAPSULE ORAL 2 TIMES DAILY
Qty: 180 CAPSULE | Refills: 0 | Status: SHIPPED | OUTPATIENT
Start: 2024-10-01

## 2024-10-01 NOTE — TELEPHONE ENCOUNTER
Please see messages and advise.      Patient is requesting a refill on gabapentin 300mg BID.  Medication was discontinued on at previous office visit on 5/7/24  Patient states in mcm this was a misunderstanding.  Discontinuing med was discussed but patient wanted to continue it.      Patient is requesting a refill    Script pended for approval if appropriate.  Last refill 9/21/23 #90 x 1   LOV 7/17/24  No future appointments.

## 2024-10-04 ENCOUNTER — LAB ENCOUNTER (OUTPATIENT)
Dept: LAB | Age: 64
End: 2024-10-04
Attending: FAMILY MEDICINE
Payer: COMMERCIAL

## 2024-10-04 DIAGNOSIS — R35.1 BENIGN PROSTATIC HYPERPLASIA WITH NOCTURIA: ICD-10-CM

## 2024-10-04 DIAGNOSIS — N40.1 BENIGN PROSTATIC HYPERPLASIA WITH NOCTURIA: ICD-10-CM

## 2024-10-04 LAB — COMPLEXED PSA SERPL-MCNC: 1.52 NG/ML (ref ?–4)

## 2024-10-04 PROCEDURE — 36415 COLL VENOUS BLD VENIPUNCTURE: CPT

## 2024-10-14 DIAGNOSIS — E11.42 TYPE 2 DIABETES MELLITUS WITH POLYNEUROPATHY (HCC): ICD-10-CM

## 2024-10-14 RX ORDER — TIRZEPATIDE 5 MG/.5ML
5 INJECTION, SOLUTION SUBCUTANEOUS WEEKLY
Qty: 6 ML | Refills: 2 | Status: SHIPPED | OUTPATIENT
Start: 2024-10-14

## 2024-11-07 RX ORDER — LOSARTAN POTASSIUM 100 MG/1
100 TABLET ORAL DAILY
Qty: 90 TABLET | Refills: 0 | Status: SHIPPED | OUTPATIENT
Start: 2024-11-07

## 2024-12-06 ENCOUNTER — OFFICE VISIT (OUTPATIENT)
Dept: FAMILY MEDICINE CLINIC | Facility: CLINIC | Age: 64
End: 2024-12-06
Payer: COMMERCIAL

## 2024-12-06 ENCOUNTER — LAB ENCOUNTER (OUTPATIENT)
Dept: LAB | Age: 64
End: 2024-12-06
Attending: FAMILY MEDICINE
Payer: COMMERCIAL

## 2024-12-06 VITALS
BODY MASS INDEX: 29.22 KG/M2 | HEART RATE: 76 BPM | HEIGHT: 76 IN | SYSTOLIC BLOOD PRESSURE: 136 MMHG | RESPIRATION RATE: 18 BRPM | WEIGHT: 240 LBS | TEMPERATURE: 97 F | DIASTOLIC BLOOD PRESSURE: 78 MMHG

## 2024-12-06 DIAGNOSIS — G89.18 POST-OP PAIN: ICD-10-CM

## 2024-12-06 DIAGNOSIS — E08.42 DIABETIC POLYNEUROPATHY ASSOCIATED WITH DIABETES MELLITUS DUE TO UNDERLYING CONDITION (HCC): ICD-10-CM

## 2024-12-06 DIAGNOSIS — E11.42 DIABETIC POLYNEUROPATHY ASSOCIATED WITH TYPE 2 DIABETES MELLITUS (HCC): ICD-10-CM

## 2024-12-06 DIAGNOSIS — I10 ESSENTIAL HYPERTENSION: ICD-10-CM

## 2024-12-06 DIAGNOSIS — L03.90 CELLULITIS, UNSPECIFIED CELLULITIS SITE: ICD-10-CM

## 2024-12-06 DIAGNOSIS — E11.40 TYPE 2 DIABETES MELLITUS WITH DIABETIC NEUROPATHY, WITHOUT LONG-TERM CURRENT USE OF INSULIN (HCC): ICD-10-CM

## 2024-12-06 DIAGNOSIS — E78.49 OTHER HYPERLIPIDEMIA: ICD-10-CM

## 2024-12-06 LAB
ALBUMIN SERPL-MCNC: 4.4 G/DL (ref 3.2–4.8)
ALBUMIN/GLOB SERPL: 1.4 {RATIO} (ref 1–2)
ALP LIVER SERPL-CCNC: 77 U/L
ALT SERPL-CCNC: 13 U/L
ANION GAP SERPL CALC-SCNC: 6 MMOL/L (ref 0–18)
AST SERPL-CCNC: 15 U/L (ref ?–34)
BILIRUB SERPL-MCNC: 0.3 MG/DL (ref 0.2–1.1)
BUN BLD-MCNC: 17 MG/DL (ref 9–23)
CALCIUM BLD-MCNC: 10.2 MG/DL (ref 8.7–10.4)
CHLORIDE SERPL-SCNC: 103 MMOL/L (ref 98–112)
CHOLEST SERPL-MCNC: 118 MG/DL (ref ?–200)
CO2 SERPL-SCNC: 28 MMOL/L (ref 21–32)
CREAT BLD-MCNC: 1.07 MG/DL
CREAT UR-SCNC: 33.8 MG/DL
EGFRCR SERPLBLD CKD-EPI 2021: 77 ML/MIN/1.73M2 (ref 60–?)
EST. AVERAGE GLUCOSE BLD GHB EST-MCNC: 128 MG/DL (ref 68–126)
FASTING PATIENT LIPID ANSWER: NO
FASTING STATUS PATIENT QL REPORTED: NO
GLOBULIN PLAS-MCNC: 3.1 G/DL (ref 2–3.5)
GLUCOSE BLD-MCNC: 85 MG/DL (ref 70–99)
HBA1C MFR BLD: 6.1 % (ref ?–5.7)
HDLC SERPL-MCNC: 48 MG/DL (ref 40–59)
LDLC SERPL CALC-MCNC: 49 MG/DL (ref ?–100)
MICROALBUMIN UR-MCNC: 0.4 MG/DL
MICROALBUMIN/CREAT 24H UR-RTO: 11.8 UG/MG (ref ?–30)
NONHDLC SERPL-MCNC: 70 MG/DL (ref ?–130)
OSMOLALITY SERPL CALC.SUM OF ELEC: 285 MOSM/KG (ref 275–295)
POTASSIUM SERPL-SCNC: 4.4 MMOL/L (ref 3.5–5.1)
PROT SERPL-MCNC: 7.5 G/DL (ref 5.7–8.2)
SODIUM SERPL-SCNC: 137 MMOL/L (ref 136–145)
TRIGL SERPL-MCNC: 113 MG/DL (ref 30–149)
VLDLC SERPL CALC-MCNC: 16 MG/DL (ref 0–30)

## 2024-12-06 PROCEDURE — 83036 HEMOGLOBIN GLYCOSYLATED A1C: CPT

## 2024-12-06 PROCEDURE — 80061 LIPID PANEL: CPT

## 2024-12-06 PROCEDURE — 82570 ASSAY OF URINE CREATININE: CPT

## 2024-12-06 PROCEDURE — 82043 UR ALBUMIN QUANTITATIVE: CPT

## 2024-12-06 PROCEDURE — 3008F BODY MASS INDEX DOCD: CPT | Performed by: FAMILY MEDICINE

## 2024-12-06 PROCEDURE — G2211 COMPLEX E/M VISIT ADD ON: HCPCS | Performed by: FAMILY MEDICINE

## 2024-12-06 PROCEDURE — 36415 COLL VENOUS BLD VENIPUNCTURE: CPT

## 2024-12-06 PROCEDURE — 3075F SYST BP GE 130 - 139MM HG: CPT | Performed by: FAMILY MEDICINE

## 2024-12-06 PROCEDURE — 3044F HG A1C LEVEL LT 7.0%: CPT | Performed by: FAMILY MEDICINE

## 2024-12-06 PROCEDURE — 3078F DIAST BP <80 MM HG: CPT | Performed by: FAMILY MEDICINE

## 2024-12-06 PROCEDURE — 80053 COMPREHEN METABOLIC PANEL: CPT

## 2024-12-06 PROCEDURE — 99214 OFFICE O/P EST MOD 30 MIN: CPT | Performed by: FAMILY MEDICINE

## 2024-12-06 PROCEDURE — 3061F NEG MICROALBUMINURIA REV: CPT | Performed by: FAMILY MEDICINE

## 2024-12-06 RX ORDER — TIRZEPATIDE 5 MG/.5ML
5 INJECTION, SOLUTION SUBCUTANEOUS WEEKLY
COMMUNITY
Start: 2024-10-15 | End: 2024-12-06

## 2024-12-06 RX ORDER — TIRZEPATIDE 5 MG/.5ML
5 INJECTION, SOLUTION SUBCUTANEOUS WEEKLY
Qty: 6 ML | Refills: 2 | Status: SHIPPED | OUTPATIENT
Start: 2024-12-06

## 2024-12-06 RX ORDER — GABAPENTIN 300 MG/1
300 CAPSULE ORAL 2 TIMES DAILY
Qty: 180 CAPSULE | Refills: 0 | Status: SHIPPED | OUTPATIENT
Start: 2024-12-06

## 2024-12-06 RX ORDER — TRAMADOL HYDROCHLORIDE 50 MG/1
50 TABLET ORAL EVERY 6 HOURS PRN
Qty: 40 TABLET | Refills: 0 | Status: SHIPPED | OUTPATIENT
Start: 2024-12-06

## 2024-12-06 RX ORDER — CEPHALEXIN 500 MG/1
500 CAPSULE ORAL 2 TIMES DAILY
Qty: 20 CAPSULE | Refills: 0 | Status: SHIPPED | OUTPATIENT
Start: 2024-12-06 | End: 2024-12-16

## 2024-12-06 RX ORDER — LOSARTAN POTASSIUM 100 MG/1
100 TABLET ORAL DAILY
Qty: 90 TABLET | Refills: 0 | Status: SHIPPED | OUTPATIENT
Start: 2024-12-06

## 2024-12-06 RX ORDER — ATORVASTATIN CALCIUM 10 MG/1
10 TABLET, FILM COATED ORAL NIGHTLY
Qty: 90 TABLET | Refills: 0 | Status: SHIPPED | OUTPATIENT
Start: 2024-12-06 | End: 2025-03-06

## 2024-12-09 NOTE — PROGRESS NOTES
[facility] Office Note  Chief Complaint:   Chief Complaint   Patient presents with    Medication Follow-Up    Diabetes    Hyperlipidemia    Hypertension       HPI:   This is a 64 year old male coming in for lab and medication follow-up.  The patient states that he has been having some redness as well as a small wound of his right sole of the foot.  Patient states that he noticed this about a week ago.  States that he had seen podiatry and they cut to deep into the skin and caused a wound has not had a fever.  He does have a history of diabetes neuropathy hypertension hyperlipidemia      Past Medical History:    Arthritis    Minor joint pains mostly in the fingers    Back problem    Belching    Occasionally    Bloating    Occasionally    Constipation    Occasionally    Diabetic peripheral neuropathy (HCC)    Esophageal reflux    Flatulence/gas pain/belching    Occasionally    Frequent urination    Frequent use of laxatives    Occasionally    Hearing loss    Age related    Heartburn    Occasionally    High blood pressure    High cholesterol    Indigestion    Occasionally    Intractable vomiting with nausea, unspecified vomiting type    Irregular bowel habits    Frequently    Neuropathy    MID-SHIN AND DOWN    Osteoarthritis    GENERALIZED    Pain with bowel movements    Occasionally    Sleep apnea    CPAP    Type II or unspecified type diabetes mellitus without mention of complication, not stated as uncontrolled    Unspecified essential hypertension    Visual impairment    Wears glasses    Readers    Weight gain    Lock down     Past Surgical History:   Procedure Laterality Date    Appendectomy      Back surgery  6-7-2021    Colonoscopy      Colonoscopy  2015    Other      Right big toe partially amputated    Other surgical history  11/06/2023    REDO LUMBAR 4, LUMBAR 5 LAMINECTOMIES    Sigmoidoscopy,diagnostic  1989    Spine surgery procedure unlisted      2021    Vasectomy  18 years ago      Social History:  Social History     Socioeconomic History    Marital status:    Tobacco Use    Smoking status: Former     Current packs/day: 0.00     Average packs/day: 1 pack/day for 38.0 years (38.0 ttl pk-yrs)     Types: Cigarettes     Start date: 1981     Quit date: 2019     Years since quittin.2    Smokeless tobacco: Never    Tobacco comments:     Quit in 2019   Vaping Use    Vaping status: Never Used   Substance and Sexual Activity    Alcohol use: Yes     Alcohol/week: 4.0 standard drinks of alcohol     Types: 4 Cans of beer per week     Comment: Occasionally    Drug use: No   Other Topics Concern    Caffeine Concern Yes     Comment: 2 cup coffee per day    Exercise No     Comment: on hold      Family History:  Family History   Problem Relation Age of Onset    Cancer Father         colon    Colon Cancer Father         Cause of death    Other (Other) Mother         alcoholism    Alcohol and Other Disorders Associated Mother         Contributed to her death in      Allergies:  Allergies[1]  Current Meds:  Current Outpatient Medications   Medication Sig Dispense Refill    atorvastatin 10 MG Oral Tab Take 1 tablet (10 mg total) by mouth nightly. 90 tablet 0    empagliflozin (JARDIANCE) 25 MG Oral Tab Take 1 tablet (25 mg total) by mouth daily. 90 tablet 0    gabapentin 300 MG Oral Cap Take 1 capsule (300 mg total) by mouth in the morning and 1 capsule (300 mg total) before bedtime. 180 capsule 0    losartan 100 MG Oral Tab Take 1 tablet (100 mg total) by mouth daily. 90 tablet 0    metFORMIN HCl 1000 MG Oral Tab Take 1 tablet (1,000 mg total) by mouth 2 (two) times daily with meals. 180 tablet 0    traMADol 50 MG Oral Tab Take 1 tablet (50 mg total) by mouth every 6 (six) hours as needed for Pain. 40 tablet 0    Tirzepatide (MOUNJARO) 5 MG/0.5ML Subcutaneous Solution Auto-injector Inject 5 mg into the skin once a week. 6 mL 2    cephALEXin 500 MG Oral Cap Take 1 capsule (500 mg total)  by mouth 2 (two) times daily for 10 days. 20 capsule 0    Tirzepatide (MOUNJARO) 5 MG/0.5ML Subcutaneous Solution Pen-injector Inject 5 mg into the skin once a week. 6 mL 2    azelastine 0.1 % Nasal Solution 2 sprays by Nasal route 2 (two) times daily.      Glucose Blood (CONTOUR NEXT TEST) In Vitro Strip Pt testing bid Pt uses contour next meter 200 each 3    FAMOTIDINE OR Take 1 tablet by mouth daily as needed.        Counseling given: Not Answered  Tobacco comments: Quit in 2019       REVIEW OF SYSTEMS:   Consitutional: No fevers, chills, sweats  Eye: No recent visual problems  ENMT: No ear pain nasal congestion sore throat  Respiratory: No shortness of breath, cough  Cardiovascular: No chest pain palpitations syncope  Gastrointestinal: No nausea vomiting diarrhea  Genitourinary: No hematuria  Hema/Lymph no bruising tendency, swollen lymph glands  Endocrine: Negative for excessive thirst excessive hunger  Musculoskeletal: No back pain neck pain joint pain muscle pain decreased range of motion  Integumentary: No rash, pruritus, abrasions positive wound right foot      Medical, surgical, family, and social histories were reviewed      EXAM:   VITALS:   Vitals:    12/06/24 1142   BP: 136/78   Pulse: 76   Resp: 18   Temp: 97.1 °F (36.2 °C)      GENERAL: well developed, well nourished, in no apparent distress  SKIN: no rashes, no suspicious lesions: Cool and Dry  HEENT: atraumatic, normocephalic, ears and throat are clear    Ears: TM's clear and visible bilaterally, no excess cerumen or erythema.   EYES: Pupils equal round and reactive.  Extraocular motions intact no scleral icterus no injection or drainage  THROAT without erythema tonsillar hypertrophy or exudate.  Uvula midline airway patent  NECK: Given midline.  No JVD or lymphadenopathy supple nontender no meningeal signs   LUNGS: clear to auscultation sounds equal bilaterally no wheezes rales or rhonchi  CARDIO: Regular rate and rhythm without murmurs gallops or  rubs  GI: Soft nontender nondistended no hepatomegaly palpable masses.  No guarding.   without deformity or crepitus no flank tenderness  EXTREMITIES: no cyanosis, clubbing or edema no joint tenderness effusion or edema noted.  No calf tenderness negative Homans' sign bilaterally  There is a small wound present of the right sole of the foot under the fourth and fifth digits there is erythema surrounding the wound as well as granulation tissue  Bilateral barefoot skin diabetic exam is abnormal with diabetic monofilament/sensation testing abnormal, skin breakdown (Ulcer), and callus - with deeper color changes     ASSESSMENT AND PLAN:   1. Diabetic polyneuropathy associated with diabetes mellitus due to underlying condition ([facility])  - empagliflozin (JARDIANCE) 25 MG Oral Tab; Take 1 tablet (25 mg total) by mouth daily.  Dispense: 90 tablet; Refill: 0  - gabapentin 300 MG Oral Cap; Take 1 capsule (300 mg total) by mouth in the morning and 1 capsule (300 mg total) before bedtime.  Dispense: 180 capsule; Refill: 0  - Tirzepatide (MOUNJARO) 5 MG/0.5ML Subcutaneous Solution Auto-injector; Inject 5 mg into the skin once a week.  Dispense: 6 mL; Refill: 2  - Hemoglobin A1C; Future  - Comp Metabolic Panel (14); Future  - Lipid Panel; Future  - Microalb/Creat Ratio, Random Urine; Future  I did discuss with the patient he has continued to do well with his diabetes his hemoglobin A1c has improved previously was at a 9.  He was asked to continue with Jardiance Mounjaro he was asked to continue to watch his diet he was given refills of these.  He was asked to repeat his blood work in the next 6 months  2. Diabetic polyneuropathy associated with type 2 diabetes mellitus (HCC)  - metFORMIN HCl 1000 MG Oral Tab; Take 1 tablet (1,000 mg total) by mouth 2 (two) times daily with meals.  Dispense: 180 tablet; Refill: 0    3. Type 2 diabetes mellitus with diabetic neuropathy, without long-term current use of insulin ([facility])  -  metFORMIN HCl 1000 MG Oral Tab; Take 1 tablet (1,000 mg total) by mouth 2 (two) times daily with meals.  Dispense: 180 tablet; Refill: 0    4. Post-op pain  - traMADol 50 MG Oral Tab; Take 1 tablet (50 mg total) by mouth every 6 (six) hours as needed for Pain.  Dispense: 40 tablet; Refill: 0    5. Other hyperlipidemia  - atorvastatin 10 MG Oral Tab; Take 1 tablet (10 mg total) by mouth nightly.  Dispense: 90 tablet; Refill: 0    6. Essential hypertension  - losartan 100 MG Oral Tab; Take 1 tablet (100 mg total) by mouth daily.  Dispense: 90 tablet; Refill: 0  Patient's blood pressure has been well-controlled he was asked to continue to take the medication as prescribed.  7. Cellulitis, unspecified cellulitis site  - cephALEXin 500 MG Oral Cap; Take 1 capsule (500 mg total) by mouth 2 (two) times daily for 10 days.  Dispense: 20 capsule; Refill: 0  I did discuss with the patient at this time like for him to start the cephalexin twice a day for the next 10 days I did discuss I would like for him to follow-up in the next 10 days to ensure that this is healing was asked to follow-up sooner if he has any increased redness or discharge    Meds & Refills for this Visit:  Requested Prescriptions     Signed Prescriptions Disp Refills    atorvastatin 10 MG Oral Tab 90 tablet 0     Sig: Take 1 tablet (10 mg total) by mouth nightly.    empagliflozin (JARDIANCE) 25 MG Oral Tab 90 tablet 0     Sig: Take 1 tablet (25 mg total) by mouth daily.    gabapentin 300 MG Oral Cap 180 capsule 0     Sig: Take 1 capsule (300 mg total) by mouth in the morning and 1 capsule (300 mg total) before bedtime.    losartan 100 MG Oral Tab 90 tablet 0     Sig: Take 1 tablet (100 mg total) by mouth daily.    metFORMIN HCl 1000 MG Oral Tab 180 tablet 0     Sig: Take 1 tablet (1,000 mg total) by mouth 2 (two) times daily with meals.    traMADol 50 MG Oral Tab 40 tablet 0     Sig: Take 1 tablet (50 mg total) by mouth every 6 (six) hours as needed for  Pain.    Tirzepatide (MOUNJARO) 5 MG/0.5ML Subcutaneous Solution Auto-injector 6 mL 2     Sig: Inject 5 mg into the skin once a week.    cephALEXin 500 MG Oral Cap 20 capsule 0     Sig: Take 1 capsule (500 mg total) by mouth 2 (two) times daily for 10 days.       Health Maintenance:  Health Maintenance Due   Topic Date Due    COVID-19 Vaccine (4 - 2024-25 season) 09/01/2024    Influenza Vaccine (1) 10/01/2024    Annual Physical  10/17/2024    Diabetes Care Dilated Eye Exam  02/23/2025       Patient/Caregiver Education: Patient/Caregiver Education: There are no barriers to learning. Medical education done.   Outcome: Patient verbalizes understanding. Patient is notified to call with any questions, complications, allergies, or worsening or changing symptoms.  Patient is to call with any side effects or complications from the treatments as a result of today.     Problem List:  Patient Active Problem List   Diagnosis    Type 2 diabetes mellitus with polyneuropathy (HCC)    Unspecified hereditary and idiopathic peripheral neuropathy    Psychosexual dysfunction with inhibited sexual excitement    Screening for other and unspecified endocrine, nutritional, metabolic, and immunity disorders    Special screening for malignant neoplasm of prostate    Screening for iron deficiency anemia    Other nonspecific finding on examination of urine    Screening for other and unspecified genitourinary condition    Diabetic neuropathy (HCC)    Essential hypertension    Blister of toe, infected    Sx 7/26/19, Left Realignment and Arthrodesis Revision, 1st, 2nd [facility], [facility], Prox [place] Autograft w/ Dr. [employer] Exp 10/24/19    Charcot's arthropathy    Sx 10/19/18, Left 1st and 2nd TMT Realignment Arthrodesis w/ Dr. [employer] Exp 1/17/19    Secondary localized osteoarthrosis of ankle and foot, left    Left ankle pain, unspecified chronicity    Special screening for malignant neoplasms, colon    Family history  of colon cancer    Benign neoplasm of cecum    Benign neoplasm of transverse colon    Benign neoplasm of descending colon    Flank pain    Left hip pain    Intractable vomiting with nausea, unspecified vomiting type    Acute left-sided low back pain without sciatica    Ulcer of right foot, limited to breakdown of skin (HCC)    Constipation    Lumbar radiculopathy    History of falling    Hyperlipidemia, unspecified    Long term (current) use of oral hypoglycemic drugs    Obstructive sleep apnea (adult) (pediatric)    Personal history of nicotine dependence    Unspecified hearing loss, unspecified ear    Radiculopathy, lumbar region    Type 2 diabetes mellitus with foot ulcer (CODE) (HCC)    Non-prs chronic ulcer oth prt r foot limited to brkdwn skin (HCC)    Cataract    History of adenomatous polyp of colon         No follow-ups on file.     Inderjit Le MD    Please note that portions of this note may have been completed with a voice recognition program. Efforts were made to edit the dictations but occasionally words are mis-transcribed.       [1] No Known Allergies

## 2024-12-10 DIAGNOSIS — G89.18 POST-OP PAIN: ICD-10-CM

## 2024-12-10 RX ORDER — TRAMADOL HYDROCHLORIDE 50 MG/1
50 TABLET ORAL EVERY 6 HOURS PRN
Qty: 28 TABLET | Refills: 0 | Status: SHIPPED | OUTPATIENT
Start: 2024-12-10 | End: 2024-12-17

## 2024-12-10 NOTE — TELEPHONE ENCOUNTER
Sujey called from [facility] mail order pharmacy. They will not refill the tramadol fro # 40 because he has not had a refill since March and the insurance company considers him \" Opioid Naive\" they will only refill 7 days worth. A total of 28 . Please resend the prescription to them for the new amount. Prescription pended. Patient notified of the change.

## 2025-01-23 ENCOUNTER — PATIENT MESSAGE (OUTPATIENT)
Dept: FAMILY MEDICINE CLINIC | Facility: CLINIC | Age: 65
End: 2025-01-23

## 2025-01-29 ENCOUNTER — OFFICE VISIT (OUTPATIENT)
Dept: FAMILY MEDICINE CLINIC | Facility: CLINIC | Age: 65
End: 2025-01-29
Payer: COMMERCIAL

## 2025-01-29 ENCOUNTER — HOSPITAL ENCOUNTER (OUTPATIENT)
Dept: GENERAL RADIOLOGY | Age: 65
Discharge: HOME | End: 2025-01-29
Attending: FAMILY MEDICINE
Payer: COMMERCIAL

## 2025-01-29 VITALS
RESPIRATION RATE: 18 BRPM | HEIGHT: 76 IN | TEMPERATURE: 98 F | BODY MASS INDEX: 28.74 KG/M2 | WEIGHT: 236 LBS | HEART RATE: 83 BPM | SYSTOLIC BLOOD PRESSURE: 124 MMHG | DIASTOLIC BLOOD PRESSURE: 56 MMHG

## 2025-01-29 DIAGNOSIS — M25.511 ACUTE PAIN OF RIGHT SHOULDER: Primary | ICD-10-CM

## 2025-01-29 DIAGNOSIS — M25.511 ACUTE PAIN OF RIGHT SHOULDER: ICD-10-CM

## 2025-01-29 PROCEDURE — G2211 COMPLEX E/M VISIT ADD ON: HCPCS | Performed by: FAMILY MEDICINE

## 2025-01-29 PROCEDURE — 3074F SYST BP LT 130 MM HG: CPT | Performed by: FAMILY MEDICINE

## 2025-01-29 PROCEDURE — 73030 X-RAY EXAM OF SHOULDER: CPT | Performed by: FAMILY MEDICINE

## 2025-01-29 PROCEDURE — 3078F DIAST BP <80 MM HG: CPT | Performed by: FAMILY MEDICINE

## 2025-01-29 PROCEDURE — 99214 OFFICE O/P EST MOD 30 MIN: CPT | Performed by: FAMILY MEDICINE

## 2025-01-29 PROCEDURE — 3008F BODY MASS INDEX DOCD: CPT | Performed by: FAMILY MEDICINE

## 2025-01-29 RX ORDER — CYCLOBENZAPRINE HCL 10 MG
10 TABLET ORAL NIGHTLY PRN
Qty: 30 TABLET | Refills: 0 | Status: SHIPPED | OUTPATIENT
Start: 2025-01-29

## 2025-01-29 RX ORDER — PREDNISONE 50 MG/1
50 TABLET ORAL DAILY
Qty: 7 TABLET | Refills: 0 | Status: SHIPPED | OUTPATIENT
Start: 2025-01-29 | End: 2025-02-05

## 2025-01-29 NOTE — PROGRESS NOTES
[facility] Office Note  Chief Complaint:   Chief Complaint   Patient presents with    Neck Pain    Shoulder Pain     Pt c/o right right shoulder and neck pain for 2 1/2 weeks.       HPI:   This is a 64 year old male coming in for right shoulder pain.  The patient states that this has been going on for the last 2 weeks.  He denies any trauma or injury.  States that he does have some pain whenever he is lifting things or lifting the shoulder above his head.  He denies any numbness or tingling of the extremity.      Past Medical History:    Arthritis    Minor joint pains mostly in the fingers    Back problem    Belching    Occasionally    Bloating    Occasionally    Constipation    Occasionally    Diabetic peripheral neuropathy (HCC)    Esophageal reflux    Flatulence/gas pain/belching    Occasionally    Frequent urination    Frequent use of laxatives    Occasionally    Hearing loss    Age related    Heartburn    Occasionally    High blood pressure    High cholesterol    Indigestion    Occasionally    Intractable vomiting with nausea, unspecified vomiting type    Irregular bowel habits    Frequently    Neuropathy    MID-SHIN AND DOWN    Osteoarthritis    GENERALIZED    Pain with bowel movements    Occasionally    Sleep apnea    CPAP    Type II or unspecified type diabetes mellitus without mention of complication, not stated as uncontrolled    Unspecified essential hypertension    Visual impairment    Wears glasses    Readers    Weight gain    Lock down     Past Surgical History:   Procedure Laterality Date    Appendectomy      Back surgery  6-7-2021    Colonoscopy      Colonoscopy  2015    Other      Right big toe partially amputated    Other surgical history  11/06/2023    REDO LUMBAR 4, LUMBAR 5 LAMINECTOMIES    Sigmoidoscopy,diagnostic  1989    Spine surgery procedure unlisted      2021    Vasectomy  18 years ago     Social History:  Social History     Socioeconomic History    Marital  status:    Tobacco Use    Smoking status: Former     Current packs/day: 0.00     Average packs/day: 1 pack/day for 38.0 years (38.0 ttl pk-yrs)     Types: Cigarettes     Start date: 1981     Quit date: 2019     Years since quittin.3    Smokeless tobacco: Never    Tobacco comments:     Quit in 2019   Vaping Use    Vaping status: Never Used   Substance and Sexual Activity    Alcohol use: Yes     Alcohol/week: 4.0 standard drinks of alcohol     Types: 4 Cans of beer per week     Comment: Occasionally    Drug use: No   Other Topics Concern    Caffeine Concern Yes     Comment: 2 cup coffee per day    Exercise No     Comment: on hold      Family History:  Family History   Problem Relation Age of Onset    Cancer Father         colon    Colon Cancer Father         Cause of death    Other (Other) Mother         alcoholism    Alcohol and Other Disorders Associated Mother         Contributed to her death in      Allergies:  Allergies[1]  Current Meds:  Current Outpatient Medications   Medication Sig Dispense Refill    predniSONE 50 MG Oral Tab Take 1 tablet (50 mg total) by mouth daily for 7 days. 7 tablet 0    cyclobenzaprine 10 MG Oral Tab Take 1 tablet (10 mg total) by mouth nightly as needed for Muscle spasms. 30 tablet 0    atorvastatin 10 MG Oral Tab Take 1 tablet (10 mg total) by mouth nightly. 90 tablet 0    empagliflozin (JARDIANCE) 25 MG Oral Tab Take 1 tablet (25 mg total) by mouth daily. 90 tablet 0    gabapentin 300 MG Oral Cap Take 1 capsule (300 mg total) by mouth in the morning and 1 capsule (300 mg total) before bedtime. 180 capsule 0    losartan 100 MG Oral Tab Take 1 tablet (100 mg total) by mouth daily. 90 tablet 0    metFORMIN HCl 1000 MG Oral Tab Take 1 tablet (1,000 mg total) by mouth 2 (two) times daily with meals. 180 tablet 0    Tirzepatide (MOUNJARO) 5 MG/0.5ML Subcutaneous Solution Auto-injector Inject 5 mg into the skin once a week. 6 mL 2    Tirzepatide (MOUNJARO) 5  MG/0.5ML Subcutaneous Solution Pen-injector Inject 5 mg into the skin once a week. 6 mL 2    azelastine 0.1 % Nasal Solution 2 sprays by Nasal route 2 (two) times daily.      Glucose Blood (CONTOUR NEXT TEST) In Vitro Strip Pt testing bid Pt uses contour next meter 200 each 3    FAMOTIDINE OR Take 1 tablet by mouth daily as needed.        Counseling given: Not Answered  Tobacco comments: Quit in 2019       REVIEW OF SYSTEMS:   Consitutional: No fevers, chills, sweats  Eye: No recent visual problems  ENMT: No ear pain nasal congestion sore throat  Respiratory: No shortness of breath, cough  Cardiovascular: No chest pain palpitations syncope  Gastrointestinal: No nausea vomiting diarrhea  Genitourinary: No hematuria  Hema/Lymph no bruising tendency, swollen lymph glands  Endocrine: Negative for excessive thirst excessive hunger  Musculoskeletal: No back pain neck pain joint pain muscle pain decreased range of motion  Positive right shoulder pain    Medical, surgical, family, and social histories were reviewed      EXAM:   VITALS:   Vitals:    01/29/25 1050   BP: 124/56   Pulse: 83   Resp: 18   Temp: 98.3 °F (36.8 °C)      GENERAL: well developed, well nourished, in no apparent distress  SKIN: no rashes, no suspicious lesions: Cool and Dry  HEENT: atraumatic, normocephalic, ears and throat are clear    Ears: TM's clear and visible bilaterally, no excess cerumen or erythema.   EYES: Pupils equal round and reactive.  Extraocular motions intact no scleral icterus no injection or drainage  THROAT without erythema tonsillar hypertrophy or exudate.  Uvula midline airway patent  NECK: Given midline.  No JVD or lymphadenopathy supple nontender no meningeal signs   LUNGS: clear to auscultation sounds equal bilaterally no wheezes rales or rhonchi  CARDIO: Regular rate and rhythm without murmurs gallops or rubs  GI: Soft nontender nondistended no hepatomegaly palpable masses.  No guarding.   without deformity or  crepitus no flank tenderness  EXTREMITIES: no cyanosis, clubbing or edema no joint tenderness effusion or edema noted.  No calf tenderness negative Homans' sign bilaterally  There is some pain to the pronation of the right shoulder as well as pain with lifting above 90 degrees liftoff test negative    ASSESSMENT AND PLAN:   1. Acute pain of right shoulder  - predniSONE 50 MG Oral Tab; Take 1 tablet (50 mg total) by mouth daily for 7 days.  Dispense: 7 tablet; Refill: 0  - cyclobenzaprine 10 MG Oral Tab; Take 1 tablet (10 mg total) by mouth nightly as needed for Muscle spasms.  Dispense: 30 tablet; Refill: 0  - XR SHOULDER, COMPLETE (MIN 2 VIEWS), RIGHT (CPT=73030); Future  I did discuss with the patient this is likely more of a strain at this point he was asked to use prednisone once a day for next 7 days he was also given a muscle relaxant to be used as needed I did discuss the like for him to complete an x-ray.  If we do need to progress to further imaging in the future we would need to have this in place.    Meds & Refills for this Visit:  Requested Prescriptions     Signed Prescriptions Disp Refills    predniSONE 50 MG Oral Tab 7 tablet 0     Sig: Take 1 tablet (50 mg total) by mouth daily for 7 days.    cyclobenzaprine 10 MG Oral Tab 30 tablet 0     Sig: Take 1 tablet (10 mg total) by mouth nightly as needed for Muscle spasms.       Health Maintenance:  Health Maintenance Due   Topic Date Due    COVID-19 Vaccine (4 - 2024-25 season) 09/01/2024    Influenza Vaccine (1) 10/01/2024    Annual Physical  10/17/2024    Fall Risk Screening (Annual)  01/01/2025    Diabetes Care: Foot Exam (Annual)  01/01/2025    Diabetes Care: Microalb/Creat Ratio (Annual)  01/01/2025    Diabetes Care Dilated Eye Exam  02/23/2025       Patient/Caregiver Education: Patient/Caregiver Education: There are no barriers to learning. Medical education done.   Outcome: Patient verbalizes understanding. Patient is notified to call with any  questions, complications, allergies, or worsening or changing symptoms.  Patient is to call with any side effects or complications from the treatments as a result of today.     Problem List:  Patient Active Problem List   Diagnosis    Type 2 diabetes mellitus with polyneuropathy (HCC)    Unspecified hereditary and idiopathic peripheral neuropathy    Psychosexual dysfunction with inhibited sexual excitement    Screening for other and unspecified endocrine, nutritional, metabolic, and immunity disorders    Special screening for malignant neoplasm of prostate    Screening for iron deficiency anemia    Other nonspecific finding on examination of urine    Screening for other and unspecified genitourinary condition    Diabetic neuropathy (HCC)    Essential hypertension    Blister of toe, infected    Sx 7/26/19, Left Realignment and Arthrodesis Revision, 1st, 2nd TMT [facility], [facility], Prox [place] Autograft w/ Dr. [employer] Exp 10/24/19    Charcot's arthropathy    Sx 10/19/18, Left 1st and 2nd TMT Realignment Arthrodesis w/ Dr. [employer] Exp 1/17/19    Secondary localized osteoarthrosis of ankle and foot, left    Left ankle pain, unspecified chronicity    Special screening for malignant neoplasms, colon    Family history of colon cancer    Benign neoplasm of cecum    Benign neoplasm of transverse colon    Benign neoplasm of descending colon    Flank pain    Left hip pain    Intractable vomiting with nausea, unspecified vomiting type    Acute left-sided low back pain without sciatica    Ulcer of right foot, limited to breakdown of skin ([facility])    Constipation    Lumbar radiculopathy    History of falling    Hyperlipidemia, unspecified    Long term (current) use of oral hypoglycemic drugs    Obstructive sleep apnea (adult) (pediatric)    Personal history of nicotine dependence    Unspecified hearing loss, unspecified ear    Radiculopathy, lumbar region    Type 2 diabetes mellitus with foot ulcer (CODE) ([facility])     Non-prs chronic ulcer oth prt r foot limited to brkdwn skin (HCC)    Cataract    History of adenomatous polyp of colon         No follow-ups on file.     Inderjit Le MD    Please note that portions of this note may have been completed with a voice recognition program. Efforts were made to edit the dictations but occasionally words are mis-transcribed.[facility] Office Note  Chief Complaint:   Chief Complaint   Patient presents with    Neck Pain    Shoulder Pain     Pt c/o right right shoulder and neck pain for 2 1/2 weeks.       HPI:   This is a 64 year old male coming in for      Past Medical History:    Arthritis    Minor joint pains mostly in the fingers    Back problem    Belching    Occasionally    Bloating    Occasionally    Constipation    Occasionally    Diabetic peripheral neuropathy (HCC)    Esophageal reflux    Flatulence/gas pain/belching    Occasionally    Frequent urination    Frequent use of laxatives    Occasionally    Hearing loss    Age related    Heartburn    Occasionally    High blood pressure    High cholesterol    Indigestion    Occasionally    Intractable vomiting with nausea, unspecified vomiting type    Irregular bowel habits    Frequently    Neuropathy    MID-SHIN AND DOWN    Osteoarthritis    GENERALIZED    Pain with bowel movements    Occasionally    Sleep apnea    CPAP    Type II or unspecified type diabetes mellitus without mention of complication, not stated as uncontrolled    Unspecified essential hypertension    Visual impairment    Wears glasses    Readers    Weight gain    Lock down     Past Surgical History:   Procedure Laterality Date    Appendectomy      Back surgery  6-7-2021    Colonoscopy      Colonoscopy  2015    Other      Right big toe partially amputated    Other surgical history  11/06/2023    REDO LUMBAR 4, LUMBAR 5 LAMINECTOMIES    Sigmoidoscopy,diagnostic  1989    Spine surgery procedure unlisted      2021    Vasectomy  18 years ago      Social History:  Social History     Socioeconomic History    Marital status:    Tobacco Use    Smoking status: Former     Current packs/day: 0.00     Average packs/day: 1 pack/day for 38.0 years (38.0 ttl pk-yrs)     Types: Cigarettes     Start date: 1981     Quit date: 2019     Years since quittin.3    Smokeless tobacco: Never    Tobacco comments:     Quit in 2019   Vaping Use    Vaping status: Never Used   Substance and Sexual Activity    Alcohol use: Yes     Alcohol/week: 4.0 standard drinks of alcohol     Types: 4 Cans of beer per week     Comment: Occasionally    Drug use: No   Other Topics Concern    Caffeine Concern Yes     Comment: 2 cup coffee per day    Exercise No     Comment: on hold      Family History:  Family History   Problem Relation Age of Onset    Cancer Father         colon    Colon Cancer Father         Cause of death    Other (Other) Mother         alcoholism    Alcohol and Other Disorders Associated Mother         Contributed to her death in      Allergies:  Allergies[2]  Current Meds:  Current Outpatient Medications   Medication Sig Dispense Refill    predniSONE 50 MG Oral Tab Take 1 tablet (50 mg total) by mouth daily for 7 days. 7 tablet 0    cyclobenzaprine 10 MG Oral Tab Take 1 tablet (10 mg total) by mouth nightly as needed for Muscle spasms. 30 tablet 0    atorvastatin 10 MG Oral Tab Take 1 tablet (10 mg total) by mouth nightly. 90 tablet 0    empagliflozin (JARDIANCE) 25 MG Oral Tab Take 1 tablet (25 mg total) by mouth daily. 90 tablet 0    gabapentin 300 MG Oral Cap Take 1 capsule (300 mg total) by mouth in the morning and 1 capsule (300 mg total) before bedtime. 180 capsule 0    losartan 100 MG Oral Tab Take 1 tablet (100 mg total) by mouth daily. 90 tablet 0    metFORMIN HCl 1000 MG Oral Tab Take 1 tablet (1,000 mg total) by mouth 2 (two) times daily with meals. 180 tablet 0    Tirzepatide (MOUNJARO) 5 MG/0.5ML Subcutaneous Solution Auto-injector Inject  5 mg into the skin once a week. 6 mL 2    Tirzepatide (MOUNJARO) 5 MG/0.5ML Subcutaneous Solution Pen-injector Inject 5 mg into the skin once a week. 6 mL 2    azelastine 0.1 % Nasal Solution 2 sprays by Nasal route 2 (two) times daily.      Glucose Blood (CONTOUR NEXT TEST) In Vitro Strip Pt testing bid Pt uses contour next meter 200 each 3    FAMOTIDINE OR Take 1 tablet by mouth daily as needed.        Counseling given: Not Answered  Tobacco comments: Quit in 2019       REVIEW OF SYSTEMS:   Consitutional: No fevers, chills, sweats  Eye: No recent visual problems  ENMT: No ear pain nasal congestion sore throat  Respiratory: No shortness of breath, cough  Cardiovascular: No chest pain palpitations syncope  Gastrointestinal: No nausea vomiting diarrhea  Genitourinary: No hematuria  Hema/Lymph no bruising tendency, swollen lymph glands  Endocrine: Negative for excessive thirst excessive hunger  Musculoskeletal: No back pain neck pain joint pain muscle pain decreased range of motion  Integumentary: No rash, pruritus, abrasions  Neurologic: Alert and oriented x4  Psychiatric: No anxiety, depression    Medical, surgical, family, and social histories were reviewed      EXAM:   VITALS:   Vitals:    01/29/25 1050   BP: 124/56   Pulse: 83   Resp: 18   Temp: 98.3 °F (36.8 °C)      GENERAL: well developed, well nourished, in no apparent distress  SKIN: no rashes, no suspicious lesions: Cool and Dry  HEENT: atraumatic, normocephalic, ears and throat are clear    Ears: TM's clear and visible bilaterally, no excess cerumen or erythema.   EYES: Pupils equal round and reactive.  Extraocular motions intact no scleral icterus no injection or drainage  THROAT without erythema tonsillar hypertrophy or exudate.  Uvula midline airway patent  NECK: Given midline.  No JVD or lymphadenopathy supple nontender no meningeal signs   LUNGS: clear to auscultation sounds equal bilaterally no wheezes rales or rhonchi  CARDIO: Regular rate and  rhythm without murmurs gallops or rubs  GI: Soft nontender nondistended no hepatomegaly palpable masses.  No guarding.   without deformity or crepitus no flank tenderness  EXTREMITIES: no cyanosis, clubbing or edema no joint tenderness effusion or edema noted.  No calf tenderness negative Homans' sign bilaterally  NEURO: Awake and alert.  Cranial nerves II through XII intact motor and sensory grossly within normal limits.  5 out of 5 muscle strength in all muscle groups.  Normal speech.  PSYCHIATRIC: Awake, alert and oriented x3, cooperative appropriate mood and affect.  Judgment intact       ASSESSMENT AND PLAN:   1. Acute pain of right shoulder  - predniSONE 50 MG Oral Tab; Take 1 tablet (50 mg total) by mouth daily for 7 days.  Dispense: 7 tablet; Refill: 0  - cyclobenzaprine 10 MG Oral Tab; Take 1 tablet (10 mg total) by mouth nightly as needed for Muscle spasms.  Dispense: 30 tablet; Refill: 0  - XR SHOULDER, COMPLETE (MIN 2 VIEWS), RIGHT (CPT=73030); Future       Meds & Refills for this Visit:  Requested Prescriptions     Signed Prescriptions Disp Refills    predniSONE 50 MG Oral Tab 7 tablet 0     Sig: Take 1 tablet (50 mg total) by mouth daily for 7 days.    cyclobenzaprine 10 MG Oral Tab 30 tablet 0     Sig: Take 1 tablet (10 mg total) by mouth nightly as needed for Muscle spasms.       Health Maintenance:  Health Maintenance Due   Topic Date Due    COVID-19 Vaccine (4 - 2024-25 season) 09/01/2024    Influenza Vaccine (1) 10/01/2024    Annual Physical  10/17/2024    Fall Risk Screening (Annual)  01/01/2025    Diabetes Care: Foot Exam (Annual)  01/01/2025    Diabetes Care: Microalb/Creat Ratio (Annual)  01/01/2025    Diabetes Care Dilated Eye Exam  02/23/2025       Patient/Caregiver Education: Patient/Caregiver Education: There are no barriers to learning. Medical education done.   Outcome: Patient verbalizes understanding. Patient is notified to call with any questions, complications, allergies,  or worsening or changing symptoms.  Patient is to call with any side effects or complications from the treatments as a result of today.     Problem List:  Patient Active Problem List   Diagnosis    Type 2 diabetes mellitus with polyneuropathy (HCC)    Unspecified hereditary and idiopathic peripheral neuropathy    Psychosexual dysfunction with inhibited sexual excitement    Screening for other and unspecified endocrine, nutritional, metabolic, and immunity disorders    Special screening for malignant neoplasm of prostate    Screening for iron deficiency anemia    Other nonspecific finding on examination of urine    Screening for other and unspecified genitourinary condition    Diabetic neuropathy (HCC)    Essential hypertension    Blister of toe, infected    Sx 7/26/19, Left Realignment and Arthrodesis Revision, 1st, 2nd TMT [facility], [facility], Prox [place] Autograft w/ Dr. [employer] Exp 10/24/19    Charcot's arthropathy    Sx 10/19/18, Left 1st and 2nd TMT Realignment Arthrodesis w/ Dr. [employer] Exp 1/17/19    Secondary localized osteoarthrosis of ankle and foot, left    Left ankle pain, unspecified chronicity    Special screening for malignant neoplasms, colon    Family history of colon cancer    Benign neoplasm of cecum    Benign neoplasm of transverse colon    Benign neoplasm of descending colon    Flank pain    Left hip pain    Intractable vomiting with nausea, unspecified vomiting type    Acute left-sided low back pain without sciatica    Ulcer of right foot, limited to breakdown of skin (HCC)    Constipation    Lumbar radiculopathy    History of falling    Hyperlipidemia, unspecified    Long term (current) use of oral hypoglycemic drugs    Obstructive sleep apnea (adult) (pediatric)    Personal history of nicotine dependence    Unspecified hearing loss, unspecified ear    Radiculopathy, lumbar region    Type 2 diabetes mellitus with foot ulcer (CODE) (HCC)    Non-prs chronic ulcer oth prt r  foot limited to brkdwn skin (HCC)    Cataract    History of adenomatous polyp of colon         No follow-ups on file.     Inderjit Le MD    Please note that portions of this note may have been completed with a voice recognition program. Efforts were made to edit the dictations but occasionally words are mis-transcribed.       [1] No Known Allergies  [2] No Known Allergies

## 2025-02-18 ENCOUNTER — OFFICE VISIT (OUTPATIENT)
Dept: FAMILY MEDICINE CLINIC | Facility: CLINIC | Age: 65
End: 2025-02-18
Payer: COMMERCIAL

## 2025-02-18 VITALS
RESPIRATION RATE: 18 BRPM | TEMPERATURE: 98 F | SYSTOLIC BLOOD PRESSURE: 126 MMHG | HEIGHT: 76 IN | HEART RATE: 76 BPM | BODY MASS INDEX: 28.86 KG/M2 | DIASTOLIC BLOOD PRESSURE: 64 MMHG | WEIGHT: 237 LBS

## 2025-02-18 DIAGNOSIS — G89.29 CHRONIC RIGHT SHOULDER PAIN: Primary | ICD-10-CM

## 2025-02-18 DIAGNOSIS — E11.42 TYPE 2 DIABETES MELLITUS WITH POLYNEUROPATHY (HCC): ICD-10-CM

## 2025-02-18 DIAGNOSIS — M25.511 CHRONIC RIGHT SHOULDER PAIN: Primary | ICD-10-CM

## 2025-02-18 PROCEDURE — G2211 COMPLEX E/M VISIT ADD ON: HCPCS | Performed by: FAMILY MEDICINE

## 2025-02-18 PROCEDURE — 3008F BODY MASS INDEX DOCD: CPT | Performed by: FAMILY MEDICINE

## 2025-02-18 PROCEDURE — 3078F DIAST BP <80 MM HG: CPT | Performed by: FAMILY MEDICINE

## 2025-02-18 PROCEDURE — 3074F SYST BP LT 130 MM HG: CPT | Performed by: FAMILY MEDICINE

## 2025-02-18 PROCEDURE — 99214 OFFICE O/P EST MOD 30 MIN: CPT | Performed by: FAMILY MEDICINE

## 2025-02-19 NOTE — PROGRESS NOTES
[facility] Family Medicine Office Note  Chief Complaint:   Chief Complaint   Patient presents with    Shoulder Pain     Pt here to follow up on right shoulder pain.        HPI:   This is a 64 year old male coming in for follow-up for right shoulder pain.  The patient was previously seen and given prednisone as well as a muscle relaxant.  He states that he continues to have discomfort especially with certain movements.  He has not had any trauma or injury to the shoulder.      Past Medical History:    Arthritis    Minor joint pains mostly in the fingers    Back problem    Belching    Occasionally    Bloating    Occasionally    Constipation    Occasionally    Diabetic peripheral neuropathy (HCC)    Esophageal reflux    Flatulence/gas pain/belching    Occasionally    Frequent urination    Frequent use of laxatives    Occasionally    Hearing loss    Age related    Heartburn    Occasionally    High blood pressure    High cholesterol    Indigestion    Occasionally    Intractable vomiting with nausea, unspecified vomiting type    Irregular bowel habits    Frequently    Neuropathy    MID-SHIN AND DOWN    Osteoarthritis    GENERALIZED    Pain with bowel movements    Occasionally    Sleep apnea    CPAP    Type II or unspecified type diabetes mellitus without mention of complication, not stated as uncontrolled    Unspecified essential hypertension    Visual impairment    Wears glasses    Readers    Weight gain    Lock down     Past Surgical History:   Procedure Laterality Date    Appendectomy      Back surgery  6-7-2021    Colonoscopy      Colonoscopy  2015    Other      Right big toe partially amputated    Other surgical history  11/06/2023    REDO LUMBAR 4, LUMBAR 5 LAMINECTOMIES    Sigmoidoscopy,diagnostic  1989    Spine surgery procedure unlisted      2021    Vasectomy  18 years ago     Social History:  Social History     Socioeconomic History    Marital status:    Tobacco Use    Smoking status: Former      Current packs/day: 0.00     Average packs/day: 1 pack/day for 38.0 years (38.0 ttl pk-yrs)     Types: Cigarettes     Start date: 1981     Quit date: 2019     Years since quittin.4    Smokeless tobacco: Never    Tobacco comments:     Quit in 2019   Vaping Use    Vaping status: Never Used   Substance and Sexual Activity    Alcohol use: Yes     Alcohol/week: 4.0 standard drinks of alcohol     Types: 4 Cans of beer per week     Comment: Occasionally    Drug use: No   Other Topics Concern    Caffeine Concern Yes     Comment: 2 cup coffee per day    Exercise No     Comment: on hold      Family History:  Family History   Problem Relation Age of Onset    Cancer Father         colon    Colon Cancer Father         Cause of death    Other (Other) Mother         alcoholism    Alcohol and Other Disorders Associated Mother         Contributed to her death in      Allergies:  Allergies[1]  Current Meds:  Current Outpatient Medications   Medication Sig Dispense Refill    cyclobenzaprine 10 MG Oral Tab Take 1 tablet (10 mg total) by mouth nightly as needed for Muscle spasms. 30 tablet 0    atorvastatin 10 MG Oral Tab Take 1 tablet (10 mg total) by mouth nightly. 90 tablet 0    empagliflozin (JARDIANCE) 25 MG Oral Tab Take 1 tablet (25 mg total) by mouth daily. 90 tablet 0    gabapentin 300 MG Oral Cap Take 1 capsule (300 mg total) by mouth in the morning and 1 capsule (300 mg total) before bedtime. 180 capsule 0    losartan 100 MG Oral Tab Take 1 tablet (100 mg total) by mouth daily. 90 tablet 0    metFORMIN HCl 1000 MG Oral Tab Take 1 tablet (1,000 mg total) by mouth 2 (two) times daily with meals. 180 tablet 0    Tirzepatide (MOUNJARO) 5 MG/0.5ML Subcutaneous Solution Pen-injector Inject 5 mg into the skin once a week. 6 mL 2    azelastine 0.1 % Nasal Solution 2 sprays by Nasal route 2 (two) times daily.      Glucose Blood (CONTOUR NEXT TEST) In Vitro Strip Pt testing bid Pt uses contour next meter 200 each 3     FAMOTIDINE OR Take 1 tablet by mouth daily as needed.        Counseling given: Not Answered  Tobacco comments: Quit in 2019       REVIEW OF SYSTEMS:   Consitutional: No fevers, chills, sweats  Eye: No recent visual problems  ENMT: No ear pain nasal congestion sore throat  Respiratory: No shortness of breath, cough  Cardiovascular: No chest pain palpitations syncope  Gastrointestinal: No nausea vomiting diarrhea  Genitourinary: No hematuria  Hema/Lymph no bruising tendency, swollen lymph glands  Endocrine: Negative for excessive thirst excessive hunger  Musculoskeletal: No back pain neck pain joint pain muscle pain decreased range of motion  Positive right shoulder pain    Medical, surgical, family, and social histories were reviewed      EXAM:   VITALS:   Vitals:    02/18/25 1410   BP: 126/64   Pulse: 76   Resp: 18   Temp: 98.4 °F (36.9 °C)      GENERAL: well developed, well nourished, in no apparent distress  SKIN: no rashes, no suspicious lesions: Cool and Dry  HEENT: atraumatic, normocephalic, ears and throat are clear    Ears: TM's clear and visible bilaterally, no excess cerumen or erythema.   EYES: Pupils equal round and reactive.  Extraocular motions intact no scleral icterus no injection or drainage  THROAT without erythema tonsillar hypertrophy or exudate.  Uvula midline airway patent  NECK: Given midline.  No JVD or lymphadenopathy supple nontender no meningeal signs   LUNGS: clear to auscultation sounds equal bilaterally no wheezes rales or rhonchi  CARDIO: Regular rate and rhythm without murmurs gallops or rubs  GI: Soft nontender nondistended no hepatomegaly palpable masses.  No guarding.   without deformity or crepitus no flank tenderness  EXTREMITIES: no cyanosis, clubbing or edema no joint tenderness effusion or edema noted.  No calf tenderness negative Homans' sign bilaterally  There is some pain with pronation of the right shoulder as well as pain with liftoff of the right  shoulder.    ASSESSMENT AND PLAN:   1. Chronic right shoulder pain  - Ortho Referral - In Network  - Physical Therapy Referral - [place] Location  I did discuss with the patient at this time would like for him to continue with the tramadol he has at home as needed he was asked to follow-up with physical therapy as well as follow-up with orthopedics for further recommendations.    Meds & Refills for this Visit:  Requested Prescriptions      No prescriptions requested or ordered in this encounter       Health Maintenance:  Health Maintenance Due   Topic Date Due    COVID-19 Vaccine (4 - 2024-25 season) 09/01/2024    Influenza Vaccine (1) 10/01/2024    Annual Physical  10/17/2024    Diabetes Care: Foot Exam (Annual)  01/01/2025    Diabetes Care: Microalb/Creat Ratio (Annual)  01/01/2025    Diabetes Care Dilated Eye Exam  02/23/2025       Patient/Caregiver Education: Patient/Caregiver Education: There are no barriers to learning. Medical education done.   Outcome: Patient verbalizes understanding. Patient is notified to call with any questions, complications, allergies, or worsening or changing symptoms.  Patient is to call with any side effects or complications from the treatments as a result of today.     Problem List:  Patient Active Problem List   Diagnosis    Type 2 diabetes mellitus with polyneuropathy (HCC)    Unspecified hereditary and idiopathic peripheral neuropathy    Psychosexual dysfunction with inhibited sexual excitement    Screening for other and unspecified endocrine, nutritional, metabolic, and immunity disorders    Special screening for malignant neoplasm of prostate    Screening for iron deficiency anemia    Other nonspecific finding on examination of urine    Screening for other and unspecified genitourinary condition    Diabetic neuropathy (HCC)    Essential hypertension    Blister of toe, infected    Sx 7/26/19, Left Realignment and Arthrodesis Revision, 1st, 2nd TMT Joint [facility], [facility], Prox  [place] [facility]ograft w/ Dr. [facility] Exp 10/24/19    Charcot's arthropathy    Sx 10/19/18, Left 1st and 2nd TMT Realignment Arthrodesis w/ Dr. Av[employer]ano [employer] Exp 1/17/19    Secondary localized osteoarthrosis of ankle and foot, left    Left ankle pain, unspecified chronicity    Special screening for malignant neoplasms, colon    Family history of colon cancer    Benign neoplasm of cecum    Benign neoplasm of transverse colon    Benign neoplasm of descending colon    Flank pain    Left hip pain    Intractable vomiting with nausea, unspecified vomiting type    Acute left-sided low back pain without sciatica    Ulcer of right foot, limited to breakdown of skin (HCC)    Constipation    Lumbar radiculopathy    History of falling    Hyperlipidemia, unspecified    Long term (current) use of oral hypoglycemic drugs    Obstructive sleep apnea (adult) (pediatric)    Personal history of nicotine dependence    Unspecified hearing loss, unspecified ear    Radiculopathy, lumbar region    Type 2 diabetes mellitus with foot ulcer (CODE) (HCC)    Non-prs chronic ulcer oth prt r foot limited to brkdwn skin (HCC)    Cataract    History of adenomatous polyp of colon         No follow-ups on file.     Inderjit Le MD    Please note that portions of this note may have been completed with a voice recognition program. Efforts were made to edit the dictations but occasionally words are mis-transcribed.       [1] No Known Allergies

## 2025-02-26 DIAGNOSIS — E11.40 TYPE 2 DIABETES MELLITUS WITH DIABETIC NEUROPATHY, WITHOUT LONG-TERM CURRENT USE OF INSULIN (HCC): ICD-10-CM

## 2025-02-26 DIAGNOSIS — E11.42 DIABETIC POLYNEUROPATHY ASSOCIATED WITH TYPE 2 DIABETES MELLITUS (HCC): ICD-10-CM

## 2025-02-26 DIAGNOSIS — E08.42 DIABETIC POLYNEUROPATHY ASSOCIATED WITH DIABETES MELLITUS DUE TO UNDERLYING CONDITION (HCC): ICD-10-CM

## 2025-02-26 RX ORDER — GABAPENTIN 300 MG/1
300 CAPSULE ORAL 2 TIMES DAILY
Qty: 180 CAPSULE | Refills: 0 | Status: SHIPPED | OUTPATIENT
Start: 2025-02-26

## 2025-02-26 NOTE — TELEPHONE ENCOUNTER
Last office visit:  2/18/2025 for shoulder pain  Return to office:  None scheduled  Last refills:  both were filled on 12/06/2024 #180 without refills.    Please see pended RX and approve if appropriate.  Thank you.

## 2025-02-27 ENCOUNTER — TELEPHONE (OUTPATIENT)
Dept: PHYSICAL THERAPY | Age: 65
End: 2025-02-27

## 2025-03-03 ENCOUNTER — TELEPHONE (OUTPATIENT)
Facility: CLINIC | Age: 65
End: 2025-03-03

## 2025-03-03 NOTE — TELEPHONE ENCOUNTER
Patient scheduled for right shoulder pain.      Imaging in Epic. 1/29/2025      Future Appointments   Date Time Provider Department Center   3/12/2025  1:00 PM [id number]        Please advise.

## 2025-03-12 ENCOUNTER — OFFICE VISIT (OUTPATIENT)
Dept: ORTHOPEDICS CLINIC | Facility: CLINIC | Age: 65
End: 2025-03-12
Payer: COMMERCIAL

## 2025-03-12 VITALS — WEIGHT: 237 LBS | HEIGHT: 76 IN | BODY MASS INDEX: 28.86 KG/M2

## 2025-03-12 DIAGNOSIS — M19.011 PRIMARY OSTEOARTHRITIS OF RIGHT SHOULDER: ICD-10-CM

## 2025-03-12 DIAGNOSIS — M25.811 IMPINGEMENT OF RIGHT SHOULDER: ICD-10-CM

## 2025-03-12 DIAGNOSIS — M75.81 TENDINITIS OF RIGHT ROTATOR CUFF: Primary | ICD-10-CM

## 2025-03-12 PROCEDURE — 99204 OFFICE O/P NEW MOD 45 MIN: CPT | Performed by: ORTHOPAEDIC SURGERY

## 2025-03-12 PROCEDURE — 20610 DRAIN/INJ JOINT/BURSA W/O US: CPT | Performed by: ORTHOPAEDIC SURGERY

## 2025-03-12 PROCEDURE — 3008F BODY MASS INDEX DOCD: CPT | Performed by: ORTHOPAEDIC SURGERY

## 2025-03-12 RX ORDER — TRIAMCINOLONE ACETONIDE 40 MG/ML
40 INJECTION, SUSPENSION INTRA-ARTICULAR; INTRAMUSCULAR ONCE
Status: COMPLETED | OUTPATIENT
Start: 2025-03-12 | End: 2025-03-12

## 2025-03-12 RX ORDER — ATORVASTATIN CALCIUM 10 MG/1
10 TABLET, FILM COATED ORAL NIGHTLY
COMMUNITY
Start: 2025-02-14

## 2025-03-12 RX ADMIN — TRIAMCINOLONE ACETONIDE 40 MG: 40 INJECTION, SUSPENSION INTRA-ARTICULAR; INTRAMUSCULAR at 13:33:00

## 2025-03-12 NOTE — PROGRESS NOTES
[facility] New Consult    Chief Complaint   Patient presents with    Shoulder Pain     RIGHT SHOULDER  -Pain since beginning of the year      HPI: The patient is a 64 year old male referred for orthopaedic consultation by Dr. Inderjit Le with complaints of new onset right shoulder pain.  He denies injury but noted significant pain beginning just after the new year localized to the anterior lateral aspect of his right shoulder extending into the biceps.  Reaching overhead and outward is symptomatic as well as behind his back.  He does not report an injury but relates that he sleeps with his arm in the hyperabducted position and occasionally lifts hi-fi equipment.  He denies associated neck pain, numbness or tingling.  Medications including tramadol previously prescribed for his chronic back issues have provided minimal relief.  He is reluctant to continue prescription pain pills.    Past Medical History:    Arthritis    Minor joint pains mostly in the fingers    Back problem    Belching    Occasionally    Bloating    Occasionally    Constipation    Occasionally    Diabetic peripheral neuropathy (HCC)    Esophageal reflux    Flatulence/gas pain/belching    Occasionally    Frequent urination    Frequent use of laxatives    Occasionally    Hearing loss    Age related    Heartburn    Occasionally    High blood pressure    High cholesterol    Indigestion    Occasionally    Intractable vomiting with nausea, unspecified vomiting type    Irregular bowel habits    Frequently    Neuropathy    MID-SHIN AND DOWN    Osteoarthritis    GENERALIZED    Pain with bowel movements    Occasionally    Sleep apnea    CPAP    Type II or unspecified type diabetes mellitus without mention of complication, not stated as uncontrolled    Unspecified essential hypertension    Visual impairment    Wears glasses    Readers    Weight gain    Lock down     Past Surgical History:   Procedure Laterality Date    Appendectomy       Back surgery  6-7-2021    Colonoscopy      Colonoscopy  2015    Other      Right big toe partially amputated    Other surgical history  11/06/2023    REDO LUMBAR 4, LUMBAR 5 LAMINECTOMIES    Sigmoidoscopy,diagnostic  1989    Spine surgery procedure unlisted      2021    Vasectomy  18 years ago     Current Outpatient Medications   Medication Sig Dispense Refill    atorvastatin 10 MG Oral Tab Take 1 tablet (10 mg total) by mouth nightly.      gabapentin 300 MG Oral Cap Take 1 capsule (300 mg total) by mouth in the morning and 1 capsule (300 mg total) before bedtime. 180 capsule 0    metFORMIN HCl 1000 MG Oral Tab Take 1 tablet (1,000 mg total) by mouth 2 (two) times daily with meals. 180 tablet 0    empagliflozin (JARDIANCE) 25 MG Oral Tab Take 1 tablet (25 mg total) by mouth daily. 90 tablet 0    Tirzepatide (MOUNJARO) 5 MG/0.5ML Subcutaneous Solution Pen-injector Inject 5 mg into the skin once a week. 6 mL 2    azelastine 0.1 % Nasal Solution 2 sprays by Nasal route 2 (two) times daily.      Glucose Blood (CONTOUR NEXT TEST) In Vitro Strip Pt testing bid Pt uses contour next meter 200 each 3    FAMOTIDINE OR Take 1 tablet by mouth daily as needed.      cyclobenzaprine 10 MG Oral Tab Take 1 tablet (10 mg total) by mouth nightly as needed for Muscle spasms. (Patient not taking: Reported on 3/12/2025) 30 tablet 0    losartan 100 MG Oral Tab Take 1 tablet (100 mg total) by mouth daily. (Patient not taking: Reported on 3/12/2025) 90 tablet 0     Allergies[1]  Family History   Problem Relation Age of Onset    Cancer Father         colon    Colon Cancer Father         Cause of death    Other (Other) Mother         alcoholism    Alcohol and Other Disorders Associated Mother         Contributed to her death in 1982     Social History     Occupational History    Not on file   Tobacco Use    Smoking status: Former     Current packs/day: 0.00     Average packs/day: 1 pack/day for 38.0 years (38.0 ttl pk-yrs)     Types:  Cigarettes     Start date: 1981     Quit date: 2019     Years since quittin.4    Smokeless tobacco: Never    Tobacco comments:     Quit in 2019   Vaping Use    Vaping status: Never Used   Substance and Sexual Activity    Alcohol use: Yes     Alcohol/week: 4.0 standard drinks of alcohol     Types: 4 Cans of beer per week     Comment: Occasionally    Drug use: No    Sexual activity: Not on file        ROS:  Complete ROS reviewed by me and non-contributory to the chief complaint except as mentioned above.    Physical Exam:    Ht 6' 4\" (1.93 m)   Wt 237 lb (107.5 kg)   BMI 28.85 kg/m²   Constitutional: Well developed, well nourished 64 year old male  Psychological: NAD, alert and appropriate  Respiratory: Breathing comfortably on room air with RR of 10-14  Cardiac: Palpable distal pulses with pink warm extremities distally  Examination of the C-spine reveals adequate active range of motion with minimal complaint.  Right shoulder reveals no visible swelling, discoloration or deformity.  Active range of motion generates pain through the impingement zone on forward elevation to approximately 165 degrees.  Similar findings are noted on abduction through the impingement zone to 145.  External rotation is symmetrical at about 60 degrees bilaterally with internal rotation to about the belt line with significant complaints of pain.  Nam is painful anterior laterally with less pain on Speed's test and [place]'s test.  Biceps groove is minimally tender.  AC joint is mildly prominent and also very tender.  Rotator cuff strength is symmetrical and intact in all planes bilaterally.  No instability on sulcus or load-and-shift.  Hand, wrist and elbow range of motion is within acceptable limits.  Neurovascular status is intact on sensory, motor and perfusion assessment distally.    Imaging: Multiple views right shoulder personally viewed, demonstrating no acute fracture dislocation or late glenohumeral degenerative  changes.  Greater tuberosity sclerosis is noted suggestive of chronic impingement and rotator cuff syndrome.  Mild degenerative changes seen at the humeral joint with more significant narrowing at the AC joint.    XR SHOULDER, COMPLETE (MIN 2 VIEWS), RIGHT (CPT=73030)    Result Date: 1/29/2025  CONCLUSION:  Arthropathy.  See above description.    LOCATION:  [place]   Dictated by (CST): Michelle Merlos MD on 1/29/2025 at 2:06 PM     Finalized by (CST): Michelle Merlos MD on 1/29/2025 at 2:06 PM        Assessment/Diagnoses:  Diagnoses and all orders for this visit:    Tendinitis of right rotator cuff  -     DRAIN/INJECT LARGE JOINT/BURSA  -     triamcinolone acetonide (Kenalog-40) 40 MG/ML injection 40 mg    Impingement of right shoulder  -     DRAIN/INJECT LARGE JOINT/BURSA  -     triamcinolone acetonide (Kenalog-40) 40 MG/ML injection 40 mg    Primary osteoarthritis of right shoulder        Plan:  I reviewed imaging and exam findings with the patient.  Although there are some early arthritic changes on the x-rays, symptoms are consistent with rotator cuff tendinitis and impingement.  Referencing the patient's imaging studies, I explained the diagnosis, etiology and natural history of rotator cuff tendinitis and impingement.  We discussed initial treatment options including conservative care through activity modification, anti-inflammatory use and physical therapy.  I also advised that he do the best that repositioning the shoulder during sleep from the hyperabducted position.  Given the duration of symptoms and significant impact on the patient's activities of daily living and sleep, we also discussed the option for subacromial corticosteroid injection.  After reviewing the nature and risks, the patient elects to proceed.  If symptoms do not improve over the next 2 to 4 weeks, reassessment is advised for possible advanced imaging with an MRI.  If symptoms resolve and there is a desire for follow-up physical therapy, the  patient was asked to contact our office for a formal prescription.  All questions were answered and the patient verbalized understanding and appreciation.  Follow-up if symptoms do not respond.    Shoulder Subacromial Injection Procedure:  After discussing risks, benefits and alternatives to subacromial injection including but not limited to needle infection, steroid flare, temporary elevation in blood sugars in diabetics or failed improvement, the patient gave written consent to proceed.  Using meticulous sterile technique, I injected 40 mg of Kenalog mixed with 2 cc of 1% Xylocaine and 2 cc of 0.5% Marcaine at the posterior portal site of the right shoulder to minimal resistance.  The patient tolerated this well and a Band-Aid was applied.  Instructions were given to contact us if the patient experiences any adverse effects.    Molly Kowalski MD, [facility]  Orthopaedic Surgery   Sports Medicine/Knee and Shoulder  [facility]  t: [phone]  f: [phone]             This document was partially prepared using Dragon Medical voice recognition software.  Although every attempt is made to correct errors during dictation, discrepancies may still exist.            [1] No Known Allergies

## 2025-04-21 ENCOUNTER — TELEPHONE (OUTPATIENT)
Dept: FAMILY MEDICINE CLINIC | Facility: CLINIC | Age: 65
End: 2025-04-21

## 2025-04-21 DIAGNOSIS — E08.42 DIABETIC POLYNEUROPATHY ASSOCIATED WITH DIABETES MELLITUS DUE TO UNDERLYING CONDITION (HCC): ICD-10-CM

## 2025-04-21 RX ORDER — GABAPENTIN 300 MG/1
300 CAPSULE ORAL 2 TIMES DAILY
Qty: 180 CAPSULE | Refills: 0 | Status: SHIPPED | OUTPATIENT
Start: 2025-04-21

## 2025-04-21 NOTE — TELEPHONE ENCOUNTER
LOV: 2/18/25 - sick visit           12/6/24 - med check    NOV: None scheduled    Requesting refill: gabapentin 300mg    Last refill: 2/26/25 #180       Please see pended Rx for your approval. Thank you.

## 2025-04-21 NOTE — TELEPHONE ENCOUNTER
Informed patient that Gabapentin has been refilled, but he will need fasting labs done prior to any additional refills.     Patient agrees to plan and verbalized an understanding.     No additional questions or concerns at this time.

## 2025-05-13 ENCOUNTER — LAB ENCOUNTER (OUTPATIENT)
Dept: LAB | Age: 65
End: 2025-05-13
Attending: FAMILY MEDICINE
Payer: COMMERCIAL

## 2025-05-13 DIAGNOSIS — E11.42 TYPE 2 DIABETES MELLITUS WITH POLYNEUROPATHY (HCC): ICD-10-CM

## 2025-05-13 DIAGNOSIS — M25.511 CHRONIC RIGHT SHOULDER PAIN: ICD-10-CM

## 2025-05-13 DIAGNOSIS — G89.29 CHRONIC RIGHT SHOULDER PAIN: ICD-10-CM

## 2025-05-13 LAB
ALBUMIN SERPL-MCNC: 4.4 G/DL (ref 3.2–4.8)
ALBUMIN/GLOB SERPL: 1.7 {RATIO} (ref 1–2)
ALP LIVER SERPL-CCNC: 67 U/L (ref 45–117)
ALT SERPL-CCNC: 18 U/L (ref 10–49)
ANION GAP SERPL CALC-SCNC: 5 MMOL/L (ref 0–18)
AST SERPL-CCNC: 16 U/L (ref ?–34)
BILIRUB SERPL-MCNC: 0.3 MG/DL (ref 0.2–1.1)
BUN BLD-MCNC: 21 MG/DL (ref 9–23)
CALCIUM BLD-MCNC: 9.5 MG/DL (ref 8.7–10.6)
CHLORIDE SERPL-SCNC: 105 MMOL/L (ref 98–112)
CHOLEST SERPL-MCNC: 112 MG/DL (ref ?–200)
CO2 SERPL-SCNC: 30 MMOL/L (ref 21–32)
CREAT BLD-MCNC: 1.1 MG/DL (ref 0.7–1.3)
CREAT UR-SCNC: 75.2 MG/DL
EGFRCR SERPLBLD CKD-EPI 2021: 74 ML/MIN/1.73M2 (ref 60–?)
EST. AVERAGE GLUCOSE BLD GHB EST-MCNC: 128 MG/DL (ref 68–126)
FASTING PATIENT LIPID ANSWER: YES
FASTING STATUS PATIENT QL REPORTED: YES
GLOBULIN PLAS-MCNC: 2.6 G/DL (ref 2–3.5)
GLUCOSE BLD-MCNC: 101 MG/DL (ref 70–99)
HBA1C MFR BLD: 6.1 % (ref ?–5.7)
HDLC SERPL-MCNC: 47 MG/DL (ref 40–59)
LDLC SERPL CALC-MCNC: 46 MG/DL (ref ?–100)
MICROALBUMIN UR-MCNC: 0.3 MG/DL
MICROALBUMIN/CREAT 24H UR-RTO: 4 UG/MG (ref ?–30)
NONHDLC SERPL-MCNC: 65 MG/DL (ref ?–130)
OSMOLALITY SERPL CALC.SUM OF ELEC: 293 MOSM/KG (ref 275–295)
POTASSIUM SERPL-SCNC: 4 MMOL/L (ref 3.5–5.1)
PROT SERPL-MCNC: 7 G/DL (ref 5.7–8.2)
SODIUM SERPL-SCNC: 140 MMOL/L (ref 136–145)
TRIGL SERPL-MCNC: 102 MG/DL (ref 30–149)
VLDLC SERPL CALC-MCNC: 14 MG/DL (ref 0–30)

## 2025-05-13 PROCEDURE — 82570 ASSAY OF URINE CREATININE: CPT

## 2025-05-13 PROCEDURE — 80053 COMPREHEN METABOLIC PANEL: CPT

## 2025-05-13 PROCEDURE — 83036 HEMOGLOBIN GLYCOSYLATED A1C: CPT

## 2025-05-13 PROCEDURE — 80061 LIPID PANEL: CPT

## 2025-05-13 PROCEDURE — 82043 UR ALBUMIN QUANTITATIVE: CPT

## 2025-05-13 PROCEDURE — 36415 COLL VENOUS BLD VENIPUNCTURE: CPT

## 2025-05-26 DIAGNOSIS — E11.42 DIABETIC POLYNEUROPATHY ASSOCIATED WITH TYPE 2 DIABETES MELLITUS (HCC): ICD-10-CM

## 2025-05-26 DIAGNOSIS — I10 ESSENTIAL HYPERTENSION: ICD-10-CM

## 2025-05-26 DIAGNOSIS — E08.42 DIABETIC POLYNEUROPATHY ASSOCIATED WITH DIABETES MELLITUS DUE TO UNDERLYING CONDITION (HCC): ICD-10-CM

## 2025-05-26 DIAGNOSIS — E11.40 TYPE 2 DIABETES MELLITUS WITH DIABETIC NEUROPATHY, WITHOUT LONG-TERM CURRENT USE OF INSULIN (HCC): ICD-10-CM

## 2025-05-29 RX ORDER — LOSARTAN POTASSIUM 100 MG/1
100 TABLET ORAL DAILY
Qty: 90 TABLET | Refills: 0 | Status: SHIPPED | OUTPATIENT
Start: 2025-05-29

## 2025-05-29 RX ORDER — ATORVASTATIN CALCIUM 10 MG/1
10 TABLET, FILM COATED ORAL NIGHTLY
Qty: 90 TABLET | Refills: 0 | Status: SHIPPED | OUTPATIENT
Start: 2025-05-29

## 2025-06-27 ENCOUNTER — PATIENT MESSAGE (OUTPATIENT)
Dept: FAMILY MEDICINE CLINIC | Facility: CLINIC | Age: 65
End: 2025-06-27

## 2025-06-27 DIAGNOSIS — G89.29 CHRONIC LEFT SHOULDER PAIN: Primary | ICD-10-CM

## 2025-06-27 DIAGNOSIS — M25.512 CHRONIC LEFT SHOULDER PAIN: Primary | ICD-10-CM

## 2025-06-27 NOTE — TELEPHONE ENCOUNTER
Patient is asking for a referral to Dr. Kowalski for his shoulder pain. He would like to get another injection. Referral pended for authorization

## 2025-07-07 ENCOUNTER — PATIENT MESSAGE (OUTPATIENT)
Dept: ORTHOPEDICS CLINIC | Facility: CLINIC | Age: 65
End: 2025-07-07

## 2025-07-11 ENCOUNTER — TELEPHONE (OUTPATIENT)
Dept: FAMILY MEDICINE CLINIC | Facility: CLINIC | Age: 65
End: 2025-07-11

## 2025-07-14 ENCOUNTER — MED REC SCAN ONLY (OUTPATIENT)
Dept: FAMILY MEDICINE CLINIC | Facility: CLINIC | Age: 65
End: 2025-07-14

## 2025-07-17 ENCOUNTER — OFFICE VISIT (OUTPATIENT)
Dept: ORTHOPEDICS CLINIC | Facility: CLINIC | Age: 65
End: 2025-07-17
Payer: COMMERCIAL

## 2025-07-17 VITALS — WEIGHT: 237 LBS | HEIGHT: 76 IN | BODY MASS INDEX: 28.86 KG/M2

## 2025-07-17 DIAGNOSIS — M25.811 IMPINGEMENT OF RIGHT SHOULDER: ICD-10-CM

## 2025-07-17 DIAGNOSIS — M19.011 DJD OF RIGHT AC (ACROMIOCLAVICULAR) JOINT: Primary | ICD-10-CM

## 2025-07-17 DIAGNOSIS — M19.011 PRIMARY OSTEOARTHRITIS OF RIGHT SHOULDER: ICD-10-CM

## 2025-07-17 PROCEDURE — 3008F BODY MASS INDEX DOCD: CPT | Performed by: ORTHOPAEDIC SURGERY

## 2025-07-17 PROCEDURE — 99214 OFFICE O/P EST MOD 30 MIN: CPT | Performed by: ORTHOPAEDIC SURGERY

## 2025-07-17 NOTE — PROGRESS NOTES
[facility] Orthopaedic Note         The following individual(s) verbally consented to be recorded using ambient AI listening technology and understand that they can each withdraw their consent to this listening technology at any point by asking the clinician to turn off or pause the recording:    Patient name: Silvestre Riley Jr.    Chief Complaint   Patient presents with    Shoulder Pain     RIGHT SHOULDER  -Cortisone injection given last visit helped up until 3 weeks ago     History of Present Illness  Silvestre Riley Jr. is a 64 year old male who presents with recurrence of shoulder pain after prior relief from injection.    He experiences a recurrence of shoulder pain that began approximately three weeks ago, similar to previous episodes. The pain occurs during certain movements, such as reaching to adjust car controls, but sleeping in certain positions does not cause pain. Normal movement brings back the pain to the same spot as before.    He previously received an injection that provided complete relief within three to four days, lasting for several months. 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    He experiences occasional neck pain, particularly when looking up, resulting in stiffness. This neck pain does not radiate to the shoulder or arm but travels up the back of his neck and head. No numbness or tingling in the right hand, although he has neuropathy in his legs due to diabetes, which sometimes seems to affect his hands.    He consumes a large amount of water daily but feels dehydrated, noting that he can touch glass without leaving a fingerprint.    Past Medical History[1]  Past Surgical History[2]  Current Medications[3]  Allergies[4]  Family History[5]  Social History     Occupational History    Not on file   Tobacco Use    Smoking status:  Former     Current packs/day: 0.00     Average packs/day: 1 pack/day for 38.0 years (38.0 ttl pk-yrs)     Types: Cigarettes     Start date: 1981     Quit date: 2019     Years since quittin.8    Smokeless tobacco: Never    Tobacco comments:     Quit in 2019   Vaping Use    Vaping status: Never Used   Substance and Sexual Activity    Alcohol use: Yes     Alcohol/week: 4.0 standard drinks of alcohol     Types: 4 Cans of beer per week     Comment: Occasionally    Drug use: No    Sexual activity: Not on file        ROS:  Complete ROS reviewed by me and non-contributory to the chief complaint except as mentioned above.    Physical Exam:    Ht 6' 4\" (1.93 m)   Wt 237 lb (107.5 kg)   BMI 28.85 kg/m²   Constitutional: Well developed, well nourished 64 year old male  Psychological: NAD, alert and appropriate  Respiratory: Breathing comfortably on room air with RR of 10-14  Cardiac: Palpable distal pulses with pink warm extremities distally  Examination of the C-spine reveals no palpable tenderness and adequate active range of motion with minimal complaint.  RightShoulder reveals no visible swelling, discoloration or deformity.  Active range of motion generates pain through the impingement zone on forward elevation to approximately 165 degrees.  Similar findings are noted on abduction through the impingement zone to 145.  External rotation is symmetrical at about 60 degrees bilaterally with internal rotation above the belt line.  Nam is painful anterior laterally with moderate pain on Speed's test and Washoe's test.  Biceps groove is mildly tender.  AC joint is mildly prominent but minimally tender with adequate tolerance of crossarm adduction.  Rotator cuff strength is weaker and painful on the affected side on both empty can and external rotation strength testing.  No instability on sulcus or load-and-shift.  Hand, wrist and elbow range of motion is within acceptable limits.  Neurovascular status is  intact on sensory, motor and perfusion assessment distally.    Imaging:  Multiple views right shoulder personally viewed, demonstrating no acute fracture dislocation or late glenohumeral degenerative changes.  Greater tuberosity sclerosis is noted suggestive of chronic impingement and rotator cuff syndrome.  Mild degenerative changes seen at the humeral joint with more significant narrowing at the AC joint.     XR SHOULDER, COMPLETE (MIN 2 VIEWS), RIGHT (CPT=73030)     Result Date: 1/29/2025  CONCLUSION:  Arthropathy.  See above description.    LOCATION:  [facility]   Dictated by (CST): Michelle Merlos MD on 1/29/2025 at 2:06 PM     Finalized by (CST): Michelle Merlos MD on 1/29/2025 at 2:06 PM          Assessment/Diagnoses:  Diagnoses and all orders for this visit:    DJD of right AC (acromioclavicular) joint    Impingement of right shoulder    Primary osteoarthritis of right shoulder      Assessment & Plan  Right shoulder tendinitis and impingement  Recurrent right shoulder pain likely due to tendinitis and impingement. Previous cortisone injection provided complete relief for several months, indicating inflammation as a significant component. Recent exacerbation possibly due to cortisone effect wearing off rather than a new injury. Full range of motion and strength, but pain persists in certain positions, particularly through the impingement zone. Risk of rotator cuff tear due to age and activities involving heavy lifting.  - Order MRI of right shoulder to assess for rotator cuff tear.  - Advise against further cortisone injection at this time due to potential infection risk if surgical intervention is needed within three months.  - Recommend conservative management if MRI is clean or shows partial tear, unless symptoms are unacceptable.  - Advise careful handling of heavy gear to prevent further injury.  - Consider physical therapy to improve upper posture and manage shoulder condition.    Cervical spine issues  Occasional  neck pain and stiffness, particularly when looking up, possibly related to posture from frequent downward gaze. Spurling's test slightly positive on the right, indicating possible cervical radiculopathy, but no radiation to shoulder or arm.  - Consider physical therapy to improve posture and address cervical spine issues.    Diabetic neuropathy  Diabetic neuropathy primarily affecting legs, with possible mild involvement of hands. No significant numbness or tingling in right hand, but occasional dropping of objects noted. Dehydration may contribute to symptoms, but he reports high water intake.  - Ensure adequate hydration and consider electrolyte supplementation, such as Pedialyte, to address dehydration.    Molly Kowalski MD, FAAOS  Orthopaedic Surgery   Sports Medicine/Knee and Shoulder  [facility]  t: [phone]  f: [phone]             This document was partially prepared using Dragon Medical voice recognition software.  Although every attempt is made to correct errors during dictation, discrepancies may still exist.              [1]   Past Medical History:   Arthritis    Minor joint pains mostly in the fingers    Back problem    Belching    Occasionally    Bloating    Occasionally    Constipation    Occasionally    Diabetic peripheral neuropathy (HCC)    Esophageal reflux    Flatulence/gas pain/belching    Occasionally    Frequent urination    Frequent use of laxatives    Occasionally    Hearing loss    Age related    Heartburn    Occasionally    High blood pressure    High cholesterol    Indigestion    Occasionally    Intractable vomiting with nausea, unspecified vomiting type    Irregular bowel habits    Frequently    Neuropathy    MID-SHIN AND DOWN    Osteoarthritis    GENERALIZED    Pain with bowel movements    Occasionally    Sleep apnea    CPAP    Type II or unspecified type diabetes mellitus without mention of complication, not stated as uncontrolled     Unspecified essential hypertension    Visual impairment    Wears glasses    Readers    Weight gain    Lock down   [2]   Past Surgical History:  Procedure Laterality Date    Appendectomy      Back surgery  6-7-2021    Colonoscopy      Colonoscopy  2015    Other      Right big toe partially amputated    Other surgical history  11/06/2023    REDO LUMBAR 4, LUMBAR 5 LAMINECTOMIES    Sigmoidoscopy,diagnostic  1989    Spine surgery procedure unlisted      2021    Vasectomy  18 years ago   [3]   Current Outpatient Medications   Medication Sig Dispense Refill    empagliflozin (JARDIANCE) 25 MG Oral Tab Take 1 tablet (25 mg total) by mouth daily. 90 tablet 0    losartan 100 MG Oral Tab Take 1 tablet (100 mg total) by mouth daily. 90 tablet 0    metFORMIN HCl 1000 MG Oral Tab Take 1 tablet (1,000 mg total) by mouth 2 (two) times daily with meals. 180 tablet 0    atorvastatin 10 MG Oral Tab Take 1 tablet (10 mg total) by mouth nightly. 90 tablet 0    gabapentin 300 MG Oral Cap Take 1 capsule (300 mg total) by mouth in the morning and 1 capsule (300 mg total) before bedtime. 180 capsule 0    Tirzepatide (MOUNJARO) 5 MG/0.5ML Subcutaneous Solution Pen-injector Inject 5 mg into the skin once a week. 6 mL 2    azelastine 0.1 % Nasal Solution 2 sprays by Nasal route 2 (two) times daily.      Glucose Blood (CONTOUR NEXT TEST) In Vitro Strip Pt testing bid Pt uses contour next meter 200 each 3    FAMOTIDINE OR Take 1 tablet by mouth daily as needed.      cyclobenzaprine 10 MG Oral Tab Take 1 tablet (10 mg total) by mouth nightly as needed for Muscle spasms. (Patient not taking: Reported on 7/17/2025) 30 tablet 0   [4] No Known Allergies  [5]   Family History  Problem Relation Age of Onset    Cancer Father         colon    Colon Cancer Father         Cause of death    Other (Other) Mother         alcoholism    Alcohol and Other Disorders Associated Mother         Contributed to her death in 1982

## 2025-08-18 ENCOUNTER — HOSPITAL ENCOUNTER (OUTPATIENT)
Dept: MRI IMAGING | Facility: HOSPITAL | Age: 65
Discharge: HOME | End: 2025-08-18
Attending: ORTHOPAEDIC SURGERY

## 2025-08-18 DIAGNOSIS — M25.811 IMPINGEMENT OF RIGHT SHOULDER: ICD-10-CM

## 2025-08-18 DIAGNOSIS — M19.011 DJD OF RIGHT AC (ACROMIOCLAVICULAR) JOINT: ICD-10-CM

## 2025-08-18 PROCEDURE — 73221 MRI JOINT UPR EXTREM W/O DYE: CPT | Performed by: ORTHOPAEDIC SURGERY

## 2025-08-19 DIAGNOSIS — E08.42 DIABETIC POLYNEUROPATHY ASSOCIATED WITH DIABETES MELLITUS DUE TO UNDERLYING CONDITION (HCC): ICD-10-CM

## 2025-08-19 RX ORDER — GABAPENTIN 300 MG/1
300 CAPSULE ORAL 2 TIMES DAILY
Qty: 180 CAPSULE | Refills: 0 | Status: SHIPPED | OUTPATIENT
Start: 2025-08-19

## 2025-08-23 DIAGNOSIS — E11.40 TYPE 2 DIABETES MELLITUS WITH DIABETIC NEUROPATHY, WITHOUT LONG-TERM CURRENT USE OF INSULIN (HCC): ICD-10-CM

## 2025-08-23 DIAGNOSIS — E08.42 DIABETIC POLYNEUROPATHY ASSOCIATED WITH DIABETES MELLITUS DUE TO UNDERLYING CONDITION (HCC): ICD-10-CM

## 2025-08-23 DIAGNOSIS — E11.42 DIABETIC POLYNEUROPATHY ASSOCIATED WITH TYPE 2 DIABETES MELLITUS (HCC): ICD-10-CM

## 2025-08-27 RX ORDER — ATORVASTATIN CALCIUM 10 MG/1
10 TABLET, FILM COATED ORAL NIGHTLY
Qty: 90 TABLET | Refills: 0 | Status: SHIPPED | OUTPATIENT
Start: 2025-08-27

## (undated) DIAGNOSIS — I15.2 HYPERTENSION ASSOCIATED WITH DIABETES (HCC): ICD-10-CM

## (undated) DIAGNOSIS — E11.59 HYPERTENSION ASSOCIATED WITH DIABETES (HCC): ICD-10-CM

## (undated) DIAGNOSIS — E11.42 DIABETIC POLYNEUROPATHY ASSOCIATED WITH TYPE 2 DIABETES MELLITUS (HCC): ICD-10-CM

## (undated) DIAGNOSIS — E11.40 TYPE 2 DIABETES MELLITUS WITH DIABETIC NEUROPATHY, WITHOUT LONG-TERM CURRENT USE OF INSULIN (HCC): ICD-10-CM

## (undated) DEVICE — ALCOHOL 70% 4 OZ

## (undated) DEVICE — Device

## (undated) DEVICE — GLOVE SURG SENSICARE SZ 7

## (undated) DEVICE — GLOVE BIOGEL M SURG SZ 9

## (undated) DEVICE — STANDARD HYPODERMIC NEEDLE,POLYPROPYLENE HUB: Brand: MONOJECT

## (undated) DEVICE — CONTAINER SPEC STR 4OZ GRY LID

## (undated) DEVICE — DRAPE SRG 70X60IN SPLT U IMPRV

## (undated) DEVICE — PEN: MARKING STD PT 100/CS: Brand: MEDICAL ACTION INDUSTRIES

## (undated) DEVICE — GLOVE SURG TRIUMPH SZ 9

## (undated) DEVICE — LAMINECTOMY CDS: Brand: MEDLINE INDUSTRIES, INC.

## (undated) DEVICE — DRILL BIT

## (undated) DEVICE — STOCKINETTE SYNTH 4\" ROLL

## (undated) DEVICE — NON-ADHERENT STRIPS,OIL EMULSION: Brand: CURITY

## (undated) DEVICE — #15 STERILE STAINLESS BLADE: Brand: STERILE STAINLESS BLADES

## (undated) DEVICE — GLOVE SURG SENSICARE SZ 7-1/2

## (undated) DEVICE — LOWER EXTREMITY CDS-LF: Brand: MEDLINE INDUSTRIES, INC.

## (undated) DEVICE — 2.5 DRILL

## (undated) DEVICE — MARKER SKIN PREP RESIST STRL

## (undated) DEVICE — SUTURE VICRYL 0 CT-1

## (undated) DEVICE — K-WIRE W/ LONG SMOOTH TIP

## (undated) DEVICE — SUTURE ETHILON 3-0 669H

## (undated) DEVICE — C-ARM: Brand: UNBRANDED

## (undated) DEVICE — CHLORAPREP 26ML APPLICATOR

## (undated) DEVICE — TETRA-FLEX CF WOVEN LATEX FREE ELASTIC BANDAGE 6" X 5.5 YD: Brand: TETRA-FLEX™CF

## (undated) DEVICE — PADDING CAST WYTEX 2\" STER

## (undated) DEVICE — SPONGE GZ 4XL4IN 100% COT 12 PLY TYP VII WVN

## (undated) DEVICE — 3.0MM PRECISION NEURO (MATCH HEAD)

## (undated) DEVICE — LIGHT HANDLE

## (undated) DEVICE — AVANOS* TUOHY EPIDURAL NEEDLE: Brand: AVANOS

## (undated) DEVICE — PAIN TRAY: Brand: MEDLINE INDUSTRIES, INC.

## (undated) DEVICE — OCCLUSIVE GAUZE STRIP OVERWRAP,3% BISMUTH TRIBROMOPHENATE IN PETROLATUM BLEND: Brand: XEROFORM

## (undated) DEVICE — SOLUTION IRRIG 1000ML 0.9% NACL USP BTL

## (undated) DEVICE — UNDYED BRAIDED (POLYGLACTIN 910), SYNTHETIC ABSORBABLE SUTURE: Brand: COATED VICRYL

## (undated) DEVICE — DRAPE UTIL L W18XL24IN PLAS STR ADH REINF

## (undated) DEVICE — GOWN SURG AERO CHROME XXL

## (undated) DEVICE — SUTURE VICRYL 3-0 RB-1

## (undated) DEVICE — GOWN SURG AERO BLUE PERF XLG

## (undated) DEVICE — PADDING 4YDX6IN CTTN STRL WBRL

## (undated) DEVICE — REMOVER PREP SOLUTION 4OZ

## (undated) DEVICE — SCANLAN® PINCAP® ORTHOPEDIC PIN PROTECTORS - PINK & WHITE, FITS PIN/WIRE SIZE: 1.57MM (2/STERILE PKG): Brand: SCANLAN® PINCAP® ORTHOPEDIC PIN PROTECTORS

## (undated) DEVICE — SUTURE VICRYL 2-0 FS-1

## (undated) DEVICE — INTENDED FOR TISSUE SEPARATION, AND OTHER PROCEDURES THAT REQUIRE A SHARP SURGICAL BLADE TO PUNCTURE OR CUT.: Brand: BARD-PARKER ® STAINLESS STEEL BLADES

## (undated) DEVICE — BANDAID CURAD 3IN X 1IN

## (undated) DEVICE — Device: Brand: PLUMEPEN

## (undated) DEVICE — SOL  .9 1000ML BTL

## (undated) DEVICE — GAUZE SPONGES,12 PLY: Brand: CURITY

## (undated) DEVICE — NEEDLE SPINAL 22X5 405148

## (undated) DEVICE — DRAPE C-ARM UNIVERSAL

## (undated) DEVICE — DRAPE MINI C-ARM

## (undated) DEVICE — REMOVER DURAPREP 3M

## (undated) DEVICE — SUCTION CANISTER, 3000CC,SAFELINER: Brand: DEROYAL

## (undated) DEVICE — SKIN MARKER DUAL TIP WITH RULER CAP AND LABELS: Brand: DEVON

## (undated) DEVICE — ZIMMER® STERILE DISPOSABLE TOURNIQUET CUFF WITH PLC, DUAL PORT, SINGLE BLADDER, 24 IN. (61 CM)

## (undated) DEVICE — ADHESIVE MASTISOL 2/3CC VL

## (undated) DEVICE — VIOLET BRAIDED (POLYGLACTIN 910), SYNTHETIC ABSORBABLE SUTURE: Brand: COATED VICRYL

## (undated) DEVICE — BANDAGE ROLL,100% COTTON, 6 PLY, LARGE: Brand: KERLIX

## (undated) DEVICE — EXOFIN TISSUE ADHESIVE 1.0ML

## (undated) DEVICE — STERILE SYNTHETIC POLYISOPRENE POWDER-FREE SURGICAL GLOVES WITH HYDROGEL COATING: Brand: PROTEXIS

## (undated) DEVICE — DRAPE,LAPAROTOMY,PCH,STERILE: Brand: MEDLINE

## (undated) DEVICE — SUTURE FIBERWIRE S AR-7200

## (undated) DEVICE — CATH IV 14G X 5-1/4 ANGIO

## (undated) DEVICE — CATHETER IV 14GA L5.25IN ANGIOCATH STR FEP

## (undated) DEVICE — SPLINT PRECUT SYNTH 5X30

## (undated) DEVICE — ZIMMER® STERILE DISPOSABLE TOURNIQUET CUFF WITH PLC, DUAL PORT, SINGLE BLADDER, 42 IN. (107 CM)

## (undated) DEVICE — STERILE POLYISOPRENE POWDER-FREE SURGICAL GLOVES: Brand: PROTEXIS

## (undated) DEVICE — Device: Brand: INTELLICART™

## (undated) DEVICE — NEEDLE SPINAL 22X3-1/2 BLK

## (undated) DEVICE — TEMP FIX PIN THREADED: Brand: ORTHOLOC 3DI

## (undated) DEVICE — ENCORE® LATEX ACCLAIM SIZE 8, STERILE LATEX POWDER-FREE SURGICAL GLOVE: Brand: ENCORE

## (undated) DEVICE — GAUZE SPONGES,USP TYPE VII GAUZE, 12 PLY: Brand: CURITY

## (undated) DEVICE — REM POLYHESIVE ADULT PATIENT RETURN ELECTRODE: Brand: VALLEYLAB

## (undated) DEVICE — SUTURE VICRYL 0 CP-1

## (undated) DEVICE — CAST TAPE SYNTH 3

## (undated) DEVICE — LOWER EXTREMITY: Brand: MEDLINE INDUSTRIES, INC.

## (undated) DEVICE — SUTURE ETHILON 3-0 PS-1

## (undated) DEVICE — TOWEL OR BLU 16X26 STRL

## (undated) DEVICE — KIT BIO TEND AR-1676DS

## (undated) DEVICE — SUTURE VCRL SZ 0 L18IN ABSRB VLT L36MM CT-1

## (undated) DEVICE — SLEEVE COMPR M KNEE LEN SGL USE KENDALL SCD

## (undated) DEVICE — 3M™ STERI-STRIP™ REINFORCED ADHESIVE SKIN CLOSURES, R1541, 1/4 IN X 3 IN (6 MM X 75 MM), 3 STRIPS/ENVELOPE: Brand: 3M™ STERI-STRIP™

## (undated) DEVICE — ADHESIVE SKIN TOP FOR WND CLSR DERMBND ADV

## (undated) DEVICE — SUTURE VCRL SZ 2-0 L18IN ABSRB VLT L26MM CT-2

## (undated) DEVICE — DRILL SRG OIL CRTDG MAESTRO

## (undated) DEVICE — WEBRIL COTTON UNDERCAST PADDING: Brand: WEBRIL

## (undated) DEVICE — TETRA-FLEX CF WOVEN LATEX FREE ELASTIC BANDAGE 4" X 5.5 YD: Brand: TETRA-FLEX™CF

## (undated) DEVICE — SOLUTION RUBBING 4OZ 70% ISO ALC CLR

## (undated) DEVICE — 1200CC GUARDIAN II: Brand: GUARDIAN

## (undated) DEVICE — WIRE K SYNT 1.6 THR 292.71: Type: IMPLANTABLE DEVICE | Site: FOOT

## (undated) DEVICE — MICRO COVER: Brand: UNBRANDED

## (undated) DEVICE — SUTURE MONOCRYL 3-0 Y936H

## (undated) DEVICE — WIRE K SYNT 2.0 292.20: Type: IMPLANTABLE DEVICE | Site: FOOT

## (undated) DEVICE — PADDING CAST WYTEX 3IN

## (undated) DEVICE — SCD SLEEVE KNEE HI BLEND

## (undated) DEVICE — KENDALL SCD EXPRESS SLEEVES, KNEE LENGTH, MEDIUM: Brand: KENDALL SCD

## (undated) DEVICE — SUTURE VICRYL 2-0 CT-2

## (undated) DEVICE — BANDAID COVERLET 1X3

## (undated) DEVICE — SYRINGE 10ML LL TIP

## (undated) DEVICE — FIBERWIRE 2.0 AR-7220

## (undated) DEVICE — SUTURE VICRYL 0 CP-2

## (undated) NOTE — LETTER
21      RE: Estefania Medina [facility], [facility]..      : 1960    Dear Dr. Ivis Farmer,    This letter is to inform you that your patient is being scheduled for surgery with Dr. Herminio Akers on 21 at [facility]. We have asked the patient to contact yo

## (undated) NOTE — IP AVS SNAPSHOT
[facility] [place] [address] [place], [address] ~ [phone]                Discharge Summary   3/31/2017    Mr. Pfeiffer 31 II           Admission Information        Provider Department    3/31/2017 Jovanni Milton DPM Eh Patient is to be discharged home today. Prescriptions for home medication are on chart. General Post Op Instructions:  1. Do some nice big deep breathing-  ·  This can prevent pneumonia  2.  Ambulate:   · Get up and walk several times a day-not strenuou • Wear your surgical shoe/cast brace when walking, if a surgical shoe/cast brace was dispensed to  you. DO NOT TAKE ANY STEPS WITHOUT THE SHOE/CAST BRACE.   • On your return home today, sit sideways in the back seat of the car with the surgical foot  Elevat Instructions and Information about Your Health     None      Follow-up Information     Schedule an appointment as soon as possible for a visit with Veronica Benavidez DPM.    Specialty:  Chavo You information:    [address] [facility] [address]  N returning the survey you will receive in the mail. Thank you! MyChart     Visit [employer]  You can access your [employer]art to more actively manage your health care and view more details from this visit by going to [email or web address].   If you've

## (undated) NOTE — LETTER
AUTHORIZATION FOR SURGICAL OPERATION OR OTHER PROCEDURE    1. I hereby authorize Dr. Guilherme Ahumada , and [facility] staff assigned to my case to perform the following operation and/or procedure at the [facility] site:    _______________________________________________________________________________________________    Cortisone Injection left foot   _______________________________________________________________________________________________    2.  My physician has explained the nature and purpose of the operation or other procedure, possible alternative methods of treatment, the risks involved, and the possibility of complication to me.  I acknowledge that no guarantee has been made as to the result that may be obtained.  3.  I recognize that, during the course of this operation, or other procedure, unforseen conditions may necessitate additional or different procedure than those listed above.  I, therefore, further authorize and request that the above named physician, his/her physician assistants or designees perform such procedures as are, in his/her professional opinion, necessary and desirable.  4.  Any tissue or organs removed in the operation or other procedure may be disposed of by and at the discretion of the [facility].  5.  I understand that in the event of a medical emergency, I will be transported by local paramedics to [facility] or other hospital emergency department.  6.  I certify that I have read and fully understand the above consent to operation and/or other procedure.    7.  I acknowledge that my physician has explained sedation/analgesia administration to me including the risks and benefits.  I consent to the administration of sedation/analgesia as may be necessary or desirable in the judgement of my physician.    Witness signature: ___________________________________________________ Date:   ______/______/_____                    Time:  ________ A.M.  P.M.       Patient Name:  ______________________________________________________  (please print)      Patient signature:  ___________________________________________________             Relationship to Patient:           []  Parent    Responsible person                          []  Spouse  In case of minor or                    [] Other  _____________   Incompetent name:  __________________________________________________                               (please print)      _____________      Responsible person  In case of minor or  Incompetent signature:  _______________________________________________    Statement of Physician  My signature below affirms that prior to the time of the procedure, I have explained to the patient and/or his/her guardian, the risks and benefits involved in the proposed treatment and any reasonable alternative to the proposed treatment.  I have also explained the risks and benefits involved in the refusal of the proposed treatment and have answered the patient's questions.                        Date:  ______/______/_______  Provider                      Signature:  __________________________________________________________       Time:  ___________ A.M    P.M.

## (undated) NOTE — LETTER
10/16/2017    [facility] [address]    Dear Mr. Kingsley Like office has been trying to contact you to schedule an appointment with your provider. It is important that we reach you to discuss your healthcare needs. Please contact our office at your earliest convenience. Also,  Did you know that you can receive test result information and reminders securely on line through [address] [place] [address] [address]? To register for [employer], open your Internet browser and go to [email or web address] and click \"sign up now\". Follow the on-screen instructions to complete your registration. Thank you for your prompt attention to this matter. You may reach our office at [phone].      Sincerely,      [employer] and Staff

## (undated) NOTE — LETTER
6/13/2019    [facility] [address]    Dear  Yovany Zachery,     [address] [facility] office has been trying to contact you to schedule a visit with your doctor to address important healthcare needs.   It is important that you contact our o

## (undated) NOTE — MR AVS SNAPSHOT
[facility] [address], [address] [phone]               Thank you for choosing us for your health care visit with Lia Sales MD.  We are glad to serve you and happy to provide you with this quinonez Summaries. If you've been to the Emergency Department or your doctor's office, you can view your past visit information in [employer] by going to Visits < Visit Summaries. Accessbio questions? Call [phone] for help.   Access[employer] is NOT to be used for urge

## (undated) NOTE — LETTER
[facility] Testing Department  Phone: [phone]  Right Fax: [phone]    ADDRESSEE INFORMATION: SENDER INFORMATION:   To:   Jennifer Pizano MD From: Sonam Ann RN     Department: Pre-Admission Testing   Fax Number: [phone] Date:

## (undated) NOTE — LETTER
05/12/21        [address] [address]      Dear THE [facility],    [address] [facility] records indicate that you have outstanding lab work and or testing that was ordered for you and has not yet been completed:  Orders Placed Th

## (undated) NOTE — LETTER
Last Revised 02/07/06  Obstructive Sleep Apnea Questionnaire    Clinical signs and symptoms suggesting the possibility of JONY    1. Predisposing physical characteristics (positive with any of the following present)  ? BMI 35kg/m²  ?  Craniofacial abnormalit pauses which are frightening to the observer, patient regularly falls asleep within minutes after being left unstimulated) in which case they should be treated as though they have severe sleep apnea.     The sleep laboratory’s assessment (none, mild, modera Point Total for B           C. Requirement for postoperative opioids.                Opioid requirement             Points   None 0    Low dose oral opiod 1    High dose oral opioids, parenteral or neuraxial opiods 3      Point Total for C        Estimation

## (undated) NOTE — MR AVS SNAPSHOT
[place] [facility], [address] [phone]               Thank you for choosing us for your health care visit with Efra Estrada MD.  We are glad to serve you and happy to provide you with this quinonez Commonly known as:  AMARYL           Glucose Blood Strp   Test twice daily   Commonly known as:  ONETOUCH ULTRA BLUE           Linagliptin 5 MG Tabs   Take 1 tablet by mouth once daily.    Commonly known as:  MONSE           MetFORMIN HCl 1000 MG Tabs Eat plenty of low-fat dairy products High fat meats and dairy   Choose whole grain products Foods high in sodium   Water is best for hydration Fast food.    Eat at home when possible     Tips for increasing your physical activity – Adults who are physically

## (undated) NOTE — Clinical Note
Spoke with dr Waqas Madrigal  He will do next injection  Rec left l4/5 and l5/s1 tfesi  Please call and set up